# Patient Record
Sex: MALE | Race: WHITE | Employment: OTHER | URBAN - METROPOLITAN AREA
[De-identification: names, ages, dates, MRNs, and addresses within clinical notes are randomized per-mention and may not be internally consistent; named-entity substitution may affect disease eponyms.]

---

## 2017-04-17 PROBLEM — M25.512 CHRONIC PAIN OF BOTH SHOULDERS: Status: ACTIVE | Noted: 2017-04-17

## 2017-04-17 PROBLEM — M25.511 CHRONIC PAIN OF BOTH SHOULDERS: Status: ACTIVE | Noted: 2017-04-17

## 2017-04-17 PROBLEM — G89.29 CHRONIC PAIN OF BOTH SHOULDERS: Status: ACTIVE | Noted: 2017-04-17

## 2018-07-25 ENCOUNTER — HOSPITAL ENCOUNTER (EMERGENCY)
Facility: HOSPITAL | Age: 83
Discharge: HOME | End: 2018-07-25
Attending: EMERGENCY MEDICINE
Payer: MEDICARE

## 2018-07-25 ENCOUNTER — APPOINTMENT (OUTPATIENT)
Dept: CT IMAGING | Facility: HOSPITAL | Age: 83
End: 2018-07-25
Attending: EMERGENCY MEDICINE
Payer: MEDICARE

## 2018-07-25 VITALS
RESPIRATION RATE: 18 BRPM | HEART RATE: 70 BPM | SYSTOLIC BLOOD PRESSURE: 121 MMHG | HEIGHT: 70 IN | OXYGEN SATURATION: 96 % | DIASTOLIC BLOOD PRESSURE: 60 MMHG | TEMPERATURE: 98 F | WEIGHT: 155 LBS | BODY MASS INDEX: 22.19 KG/M2

## 2018-07-25 DIAGNOSIS — R10.9 ABDOMINAL PAIN OF UNKNOWN ETIOLOGY: Primary | ICD-10-CM

## 2018-07-25 LAB
ANION GAP SERPL CALC-SCNC: 10 MMOL/L (ref 0–18)
BASOPHILS # BLD: 0 K/UL (ref 0–0.2)
BASOPHILS NFR BLD: 0 %
BUN SERPL-MCNC: 14 MG/DL (ref 8–20)
BUN/CREAT SERPL: 12.6 (ref 10–20)
CALCIUM SERPL-MCNC: 9.2 MG/DL (ref 8.5–10.5)
CHLORIDE SERPL-SCNC: 105 MMOL/L (ref 95–110)
CO2 SERPL-SCNC: 23 MMOL/L (ref 22–32)
CREAT SERPL-MCNC: 1.11 MG/DL (ref 0.5–1.5)
EOSINOPHIL # BLD: 0 K/UL (ref 0–0.7)
EOSINOPHIL NFR BLD: 0 %
ERYTHROCYTE [DISTWIDTH] IN BLOOD BY AUTOMATED COUNT: 13.8 % (ref 11–15)
GLUCOSE SERPL-MCNC: 115 MG/DL (ref 70–99)
HCT VFR BLD AUTO: 44.4 % (ref 41–52)
HGB BLD-MCNC: 14.6 G/DL (ref 13.5–17.5)
LYMPHOCYTES # BLD: 1.6 K/UL (ref 1–4)
LYMPHOCYTES NFR BLD: 12 %
MCH RBC QN AUTO: 30 PG (ref 27–32)
MCHC RBC AUTO-ENTMCNC: 32.8 G/DL (ref 32–37)
MCV RBC AUTO: 91.5 FL (ref 80–100)
MONOCYTES # BLD: 1.2 K/UL (ref 0–1)
MONOCYTES NFR BLD: 9 %
NEUTROPHILS # BLD AUTO: 10.1 K/UL (ref 1.8–7.7)
NEUTROPHILS NFR BLD: 78 %
OSMOLALITY UR CALC.SUM OF ELEC: 287 MOSM/KG (ref 275–295)
PLATELET # BLD AUTO: 188 K/UL (ref 140–400)
PMV BLD AUTO: 7.9 FL (ref 7.4–10.3)
POTASSIUM SERPL-SCNC: 3.9 MMOL/L (ref 3.3–5.1)
RBC # BLD AUTO: 4.86 M/UL (ref 4.5–5.9)
SODIUM SERPL-SCNC: 138 MMOL/L (ref 136–144)
WBC # BLD AUTO: 12.9 K/UL (ref 4–11)

## 2018-07-25 PROCEDURE — 96360 HYDRATION IV INFUSION INIT: CPT

## 2018-07-25 PROCEDURE — 85025 COMPLETE CBC W/AUTO DIFF WBC: CPT | Performed by: EMERGENCY MEDICINE

## 2018-07-25 PROCEDURE — 74177 CT ABD & PELVIS W/CONTRAST: CPT | Performed by: EMERGENCY MEDICINE

## 2018-07-25 PROCEDURE — 80048 BASIC METABOLIC PNL TOTAL CA: CPT | Performed by: EMERGENCY MEDICINE

## 2018-07-25 PROCEDURE — 96361 HYDRATE IV INFUSION ADD-ON: CPT

## 2018-07-25 PROCEDURE — 99284 EMERGENCY DEPT VISIT MOD MDM: CPT

## 2018-07-25 RX ORDER — DICYCLOMINE HCL 20 MG
20 TABLET ORAL 4 TIMES DAILY PRN
Qty: 30 TABLET | Refills: 0 | Status: SHIPPED | OUTPATIENT
Start: 2018-07-25 | End: 2018-08-24

## 2018-07-25 RX ORDER — ACETAMINOPHEN 500 MG
1000 TABLET ORAL ONCE
Status: COMPLETED | OUTPATIENT
Start: 2018-07-25 | End: 2018-07-25

## 2018-07-25 NOTE — ED PROVIDER NOTES
Patient Seen in: [facility] AND [facility] Emergency Department    History   Patient presents with:  Abdominal Pain    Stated Complaint: Abdo pain    HPI    80-year-old male with history of nephrolithiasis, diverticulitis, neurogenic bladder, hyperlipidemia, BPH OTHER SURGICAL HISTORY      Comment: Fx R leg ORIF #2 arthotomy procedures  7-6-11: OTHER SURGICAL HISTORY      Comment: dr Ivy rousseau  8-11-11: OTHER SURGICAL HISTORY      Comment: cysto, laser litho bladder stone, laser                shaving of prosta complaint: Abdo pain  Other systems are as noted in HPI. Constitutional and vital signs reviewed. All other systems reviewed and negative except as noted above.     Physical Exam   ED Triage Vitals  BP: 115/67 [07/25/18 0651]  Pulse: 70 [07/25/18 0651 Result Value Ref Range   Glucose 115 (H) 70 - 99 mg/dL   Sodium 138 136 - 144 mmol/L   Potassium 3.9 3.3 - 5.1 mmol/L   Chloride 105 95 - 110 mmol/L   CO2 23 22 - 32 mmol/L   BUN 14 8 - 20 mg/dL   Creatinine 1.11 0.50 - 1.50 mg/dL   Calcium, Total 9.2 8. meds/nausea meds offered, pt declining  - pt requesting tylenol on discharge  - supportive care discussed    Medical Record Review: I personally reviewed available prior medical records for any recent pertinent discharge summaries, testing, and procedures, primary care provider within the next three months to obtain basic health screening including reassessment of your blood pressure.     Medications Prescribed:  Current Discharge Medication List    START taking these medications    Dicyclomine HCl 20 MG Oral

## 2018-09-12 ENCOUNTER — OFFICE VISIT (OUTPATIENT)
Dept: NEUROLOGY | Facility: CLINIC | Age: 83
End: 2018-09-12
Payer: MEDICARE

## 2018-09-12 VITALS — WEIGHT: 156 LBS | HEIGHT: 68 IN | BODY MASS INDEX: 23.64 KG/M2

## 2018-09-12 DIAGNOSIS — M51.16 RADICULOPATHY DUE TO LUMBAR INTERVERTEBRAL DISC DISORDER: Primary | ICD-10-CM

## 2018-09-12 DIAGNOSIS — M54.12 RADICULOPATHY OF CERVICAL REGION: ICD-10-CM

## 2018-09-12 PROCEDURE — 99204 OFFICE O/P NEW MOD 45 MIN: CPT | Performed by: OTHER

## 2018-09-12 RX ORDER — ASPIRIN 325 MG
325 TABLET ORAL
COMMUNITY
End: 2019-05-15 | Stop reason: ALTCHOICE

## 2018-09-12 NOTE — H&P
[facility], [place] [address], [address], [facility]    History and Physical    Angie Temple.  Patient Status:  No patient class for patient encounter    1935 MRN [id number]   Location [facility], [place] [address] HISTORY      Comment:  cysto, dr Liliana Cohen  8-11-11: OTHER SURGICAL HISTORY      Comment:  cysto, laser litho bladder stone, laser shaving of                prostate Dr Pratik Madden  7/21/16: OTHER SURGICAL HISTORY      Comment:  cystoscopy dr Liliana Cohen   9/1/16: OTHER S

## 2018-09-12 NOTE — PROGRESS NOTES
Mr. Jonah Low, is self-referred, due to the fact that I had evaluated him in my private practice about 5 years ago. He has been treated with injections and left and then right rotator cuff for pain with resolution.   He is in the office with long history lumba Surgical History:  1943: APPENDECTOMY  8/14/09: COLONOSCOPY,DIAGNOSTIC      Comment:  5mm ascending and sigmoid adenomatous polyps,                diverticulosis  10/2/15: COLONOSCOPY,DIAGNOSTIC      Comment:  sigmoid polyp, diverticulosis  8/27/09: HERNIA No      Sexual activity: Not on file    Other Topics      Concerns:         Service: Not Asked        Blood Transfusions: Not Asked        Caffeine Concern: Yes        Occupational Exposure: Not Asked        Hobby Hazards: Not Asked        Sleep Co the upper and lower extremities, downgoing toes, no hoffmans, no clonus. Right Achilles reflex is reduced. Coordination: Finger to nose, heel to shin intact bilaterally. No Ataxia noted. Gait: Narrow based, negative Romberg’s sign, heel to shin intact.

## 2019-05-15 PROBLEM — I70.0 ATHEROSCLEROSIS OF ABDOMINAL AORTA: Status: ACTIVE | Noted: 2019-05-15

## 2019-05-15 PROBLEM — I70.0 ATHEROSCLEROSIS OF ABDOMINAL AORTA (HCC): Status: ACTIVE | Noted: 2019-05-15

## 2019-08-02 ENCOUNTER — OFFICE VISIT (OUTPATIENT)
Dept: SURGERY | Facility: CLINIC | Age: 84
End: 2019-08-02
Payer: MEDICARE

## 2019-08-02 VITALS
DIASTOLIC BLOOD PRESSURE: 63 MMHG | BODY MASS INDEX: 22.96 KG/M2 | TEMPERATURE: 98 F | HEART RATE: 62 BPM | HEIGHT: 69 IN | SYSTOLIC BLOOD PRESSURE: 110 MMHG | WEIGHT: 155 LBS

## 2019-08-02 DIAGNOSIS — N19 RENAL FAILURE, UNSPECIFIED CHRONICITY: ICD-10-CM

## 2019-08-02 DIAGNOSIS — K40.30 INGUINAL HERNIA OF LEFT SIDE WITH OBSTRUCTION: ICD-10-CM

## 2019-08-02 DIAGNOSIS — N31.9 HYPOTONIC NEUROGENIC BLADDER: Primary | ICD-10-CM

## 2019-08-02 DIAGNOSIS — R33.8 BENIGN PROSTATIC HYPERPLASIA WITH URINARY RETENTION: ICD-10-CM

## 2019-08-02 DIAGNOSIS — R35.1 NOCTURIA: ICD-10-CM

## 2019-08-02 DIAGNOSIS — R33.9 URINARY RETENTION: ICD-10-CM

## 2019-08-02 DIAGNOSIS — N40.1 BENIGN PROSTATIC HYPERPLASIA WITH URINARY RETENTION: ICD-10-CM

## 2019-08-02 PROCEDURE — G0463 HOSPITAL OUTPT CLINIC VISIT: HCPCS | Performed by: UROLOGY

## 2019-08-02 PROCEDURE — 99204 OFFICE O/P NEW MOD 45 MIN: CPT | Performed by: UROLOGY

## 2019-08-02 NOTE — PATIENT INSTRUCTIONS
Nilesh Maki M.D.    1.  Continue self intermittent catheterization 3 times daily, sterile technique with disposable catheter.   On the days that you think your urine output will be larger such as days where you might drink

## 2019-08-02 NOTE — PROGRESS NOTES
Ziyad Jalloh. is a 80year old male. HPI:   Patient presents with:  Consult: Patient is a 80year old male here to discuss continuation of CIC. Retention: Patient states he is CIC 3x per day at 8 hours intervals. CIC since 2011.  History of hong that he has 2 cups of coffee in the morning and either milk or wine with dinner. BPH  Chronic.  S/p greenlight laser ablation of prostate by another urologist. Patient is not taking any Avodart or finasteride as well as any OTC or herbal prostate medica bladder, but no evidence for fistula. Abnormal mural thickening of urinary bladder which may be reactive or secondary to cystitis. Small fat containing left inguinal hernia.   6/4/15 simple CMG--no bladder contraction; sensation is impaired; did not even re voids about 300-350 cc daily on his own and that ranges from 125 to as much as 600 cc a day; he catheterizes himself 3 times a day. Average volume obtained on catheterization about 750 850 cc; he did obtain volumes of 900--975 cc several times, however. 7/21/16    cystoscopy dr Alyssia Beasley    • OTHER SURGICAL HISTORY  9/1/16    interstim pne dr Barth Apo      Family History   Problem Relation Age of Onset   • Glaucoma Mother    • Diabetes Neg         family h/o      Social History: Social Hi Constitutional: appears well hydrated alert and responsive no acute distress noted  Neurological: Oriented to time, place, person with normal affect  Exam appropriate for age; patellar reflexes 1/2+ bilaterally  Head/Face: normocephalic  Eyes/Vision: no hypodense lesions in the kidneys bilaterally, likely cysts; degenerative disease of the thoracic and lumbar spine; bladder wall thickening likely secondary from an enlarged prostate    6/22/15 transrectal ultrasound of prostate @ [facility] = prostate volume 35. 8 3x a day but 4x a day on days that he pushes more fluids. I fully explained to patient the benefits, risks, and alternatives to this treatment option and I answered patient's questions; patient decides to continue self intermittent catheterization.  Patient intermittent catheterization 3 times daily, sterile technique with disposable catheter.   On the days that you think your urine output will be larger such as days where you might drink more coffee than typical in the morning OR on a day when you are drinkin

## 2019-08-06 ENCOUNTER — HOSPITAL ENCOUNTER (OUTPATIENT)
Dept: ULTRASOUND IMAGING | Age: 84
Discharge: HOME | End: 2019-08-06
Attending: UROLOGY
Payer: MEDICARE

## 2019-08-06 DIAGNOSIS — N19 RENAL FAILURE, UNSPECIFIED CHRONICITY: ICD-10-CM

## 2019-08-06 PROCEDURE — 76775 US EXAM ABDO BACK WALL LIM: CPT | Performed by: UROLOGY

## 2020-04-02 ENCOUNTER — TELEPHONE (OUTPATIENT)
Dept: SURGERY | Facility: CLINIC | Age: 85
End: 2020-04-02

## 2020-04-02 NOTE — TELEPHONE ENCOUNTER
Received fax from [address] [place] [address] for detailed order for cath supplies. Order placed on MD's desk.

## 2020-04-06 NOTE — TELEPHONE ENCOUNTER
Order signed by MD, faxed to [address] [place] [address]. Confirmation received. Copy sent to scanning.

## 2020-04-27 NOTE — TELEPHONE ENCOUNTER
Received another fax from [facility] duplicate order. Re faxed original order to [facility]. Confirmation received.

## 2020-12-05 ENCOUNTER — LAB REQUISITION (OUTPATIENT)
Dept: LAB | Facility: HOSPITAL | Age: 85
End: 2020-12-05
Payer: MEDICARE

## 2020-12-05 DIAGNOSIS — Z01.818 ENCOUNTER FOR OTHER PREPROCEDURAL EXAMINATION: ICD-10-CM

## 2021-04-12 ENCOUNTER — TELEPHONE (OUTPATIENT)
Dept: SURGERY | Facility: CLINIC | Age: 86
End: 2021-04-12

## 2021-04-12 NOTE — TELEPHONE ENCOUNTER
Orders from [facility] faxed to us needs to be signed by Dr. Breanne Leon and then faxed back. Pt LOV with Dr. Breanne Leon was 8/2/19 if Dr. Breanne Leon agrees with orders then we will fax back to secure number. Placed on Dr. Breanne Leon desk    1.   Continue self intermittent catheteriza

## 2021-06-04 ENCOUNTER — TELEPHONE (OUTPATIENT)
Dept: SURGERY | Facility: CLINIC | Age: 86
End: 2021-06-04

## 2021-06-04 NOTE — TELEPHONE ENCOUNTER
Pt has a sterile lubricating gel- 95 sealed containers - he cannot return - asking if the Dr wants them

## 2021-10-20 PROBLEM — M25.511 CHRONIC PAIN OF BOTH SHOULDERS: Status: RESOLVED | Noted: 2017-04-17 | Resolved: 2021-10-20

## 2021-10-20 PROBLEM — G89.29 CHRONIC PAIN OF BOTH SHOULDERS: Status: RESOLVED | Noted: 2017-04-17 | Resolved: 2021-10-20

## 2021-10-20 PROBLEM — M25.512 CHRONIC PAIN OF BOTH SHOULDERS: Status: RESOLVED | Noted: 2017-04-17 | Resolved: 2021-10-20

## 2022-03-25 ENCOUNTER — HOSPITAL ENCOUNTER (EMERGENCY)
Facility: HOSPITAL | Age: 87
Discharge: HOME | End: 2022-03-26
Attending: EMERGENCY MEDICINE
Payer: MEDICARE

## 2022-03-25 VITALS
OXYGEN SATURATION: 97 % | RESPIRATION RATE: 18 BRPM | SYSTOLIC BLOOD PRESSURE: 144 MMHG | HEIGHT: 70 IN | BODY MASS INDEX: 20.76 KG/M2 | WEIGHT: 145 LBS | HEART RATE: 82 BPM | DIASTOLIC BLOOD PRESSURE: 65 MMHG | TEMPERATURE: 97 F

## 2022-03-25 DIAGNOSIS — L03.116 CELLULITIS OF LEFT LOWER EXTREMITY: ICD-10-CM

## 2022-03-25 DIAGNOSIS — M79.89 LEFT LEG SWELLING: Primary | ICD-10-CM

## 2022-03-25 PROCEDURE — 99284 EMERGENCY DEPT VISIT MOD MDM: CPT

## 2022-03-26 ENCOUNTER — APPOINTMENT (OUTPATIENT)
Dept: GENERAL RADIOLOGY | Facility: HOSPITAL | Age: 87
End: 2022-03-26
Attending: EMERGENCY MEDICINE
Payer: MEDICARE

## 2022-03-26 ENCOUNTER — APPOINTMENT (OUTPATIENT)
Dept: ULTRASOUND IMAGING | Facility: HOSPITAL | Age: 87
End: 2022-03-26
Attending: EMERGENCY MEDICINE
Payer: MEDICARE

## 2022-03-26 PROCEDURE — 93971 EXTREMITY STUDY: CPT | Performed by: EMERGENCY MEDICINE

## 2022-03-26 PROCEDURE — 73590 X-RAY EXAM OF LOWER LEG: CPT | Performed by: EMERGENCY MEDICINE

## 2022-03-26 RX ORDER — CEFADROXIL 500 MG/1
500 CAPSULE ORAL 2 TIMES DAILY
Qty: 20 CAPSULE | Refills: 0 | Status: SHIPPED | OUTPATIENT
Start: 2022-03-26 | End: 2022-04-05

## 2022-03-26 RX ORDER — CEPHALEXIN 500 MG/1
500 CAPSULE ORAL ONCE
Status: COMPLETED | OUTPATIENT
Start: 2022-03-26 | End: 2022-03-26

## 2022-03-26 NOTE — ED QUICK NOTES
Pt was dressed and at doorway during dc. Pt a/ox4, respirations unlabored, speech full/clear, gait steady, no acute distress. All ED orders completed. Pt instructed to return to ED for any new/severe s/s or as directed by provider, and to see PMD/specialist ASAP or as directed by provider.

## 2022-03-26 NOTE — ED INITIAL ASSESSMENT (HPI)
Pt c/o infection to left knee. Pt states he fell and the wound has not healed. Pt denies injury to head or loc. Pt states he is not taking blood thinners.

## 2022-05-24 ENCOUNTER — TELEPHONE (OUTPATIENT)
Dept: SURGERY | Facility: CLINIC | Age: 87
End: 2022-05-24

## 2022-05-24 NOTE — TELEPHONE ENCOUNTER
LINDAK returned a signed script for CIC caths and lube for this pt for 4 times daily and I signed the script in the [address] [place] [address] online portal and and I sent the original to scanning.

## 2022-07-11 ENCOUNTER — TELEPHONE (OUTPATIENT)
Dept: SURGERY | Facility: CLINIC | Age: 87
End: 2022-07-11

## 2022-11-07 ENCOUNTER — HOSPITAL ENCOUNTER (EMERGENCY)
Facility: HOSPITAL | Age: 87
Discharge: LEFT BEFORE DISCHARGE | End: 2022-11-07
Payer: MEDICARE

## 2022-11-07 VITALS
OXYGEN SATURATION: 96 % | RESPIRATION RATE: 16 BRPM | TEMPERATURE: 98 F | HEIGHT: 70 IN | HEART RATE: 84 BPM | BODY MASS INDEX: 18.61 KG/M2 | WEIGHT: 130 LBS | DIASTOLIC BLOOD PRESSURE: 68 MMHG | SYSTOLIC BLOOD PRESSURE: 108 MMHG

## 2022-12-31 ENCOUNTER — APPOINTMENT (OUTPATIENT)
Dept: ULTRASOUND IMAGING | Facility: HOSPITAL | Age: 87
End: 2022-12-31
Attending: EMERGENCY MEDICINE
Payer: MEDICARE

## 2022-12-31 ENCOUNTER — HOSPITAL ENCOUNTER (EMERGENCY)
Facility: HOSPITAL | Age: 87
Discharge: HOME | End: 2022-12-31
Attending: EMERGENCY MEDICINE
Payer: MEDICARE

## 2022-12-31 VITALS
OXYGEN SATURATION: 98 % | BODY MASS INDEX: 24.34 KG/M2 | HEART RATE: 69 BPM | HEIGHT: 70 IN | WEIGHT: 170 LBS | DIASTOLIC BLOOD PRESSURE: 82 MMHG | RESPIRATION RATE: 18 BRPM | SYSTOLIC BLOOD PRESSURE: 131 MMHG | TEMPERATURE: 98 F

## 2022-12-31 DIAGNOSIS — M71.22 BAKER'S CYST, LEFT: ICD-10-CM

## 2022-12-31 DIAGNOSIS — L03.116 CELLULITIS OF LEFT LOWER EXTREMITY: Primary | ICD-10-CM

## 2022-12-31 PROCEDURE — 99284 EMERGENCY DEPT VISIT MOD MDM: CPT

## 2022-12-31 PROCEDURE — 93971 EXTREMITY STUDY: CPT | Performed by: EMERGENCY MEDICINE

## 2022-12-31 RX ORDER — CEPHALEXIN 500 MG/1
500 CAPSULE ORAL ONCE
Status: COMPLETED | OUTPATIENT
Start: 2022-12-31 | End: 2022-12-31

## 2022-12-31 RX ORDER — CEFADROXIL 500 MG/1
500 CAPSULE ORAL 2 TIMES DAILY
Qty: 20 CAPSULE | Refills: 0 | Status: SHIPPED | OUTPATIENT
Start: 2022-12-31 | End: 2023-01-10

## 2022-12-31 NOTE — ED QUICK NOTES
Patient provided with discharge instructions. Verbalized understanding of plan of care at home and follow up. All questions/concerns addressed prior to discharge. Patient ambulatory and in no acute distress.

## 2022-12-31 NOTE — ED QUICK NOTES
Upon assessment patient stated he \"was getting ready to walk out\". Patient agreed to [facility]. US was completed Patient refused lab work x 2. MD notified.

## 2023-04-30 ENCOUNTER — HOSPITAL ENCOUNTER (EMERGENCY)
Facility: HOSPITAL | Age: 88
Discharge: HOME | End: 2023-04-30
Attending: EMERGENCY MEDICINE
Payer: MEDICARE

## 2023-04-30 VITALS
RESPIRATION RATE: 14 BRPM | DIASTOLIC BLOOD PRESSURE: 63 MMHG | HEART RATE: 57 BPM | SYSTOLIC BLOOD PRESSURE: 119 MMHG | WEIGHT: 130 LBS | OXYGEN SATURATION: 98 % | TEMPERATURE: 97 F | BODY MASS INDEX: 19 KG/M2

## 2023-04-30 DIAGNOSIS — Z00.00 GENERAL MEDICAL EXAMINATION: Primary | ICD-10-CM

## 2023-04-30 LAB
ANION GAP SERPL CALC-SCNC: 7 MMOL/L (ref 0–18)
ATRIAL RATE: 72 BPM
BASOPHILS # BLD AUTO: 0.03 X10(3) UL (ref 0–0.2)
BASOPHILS NFR BLD AUTO: 0.5 %
BUN BLD-MCNC: 17 MG/DL (ref 7–18)
BUN/CREAT SERPL: 18.1 (ref 10–20)
CALCIUM BLD-MCNC: 9.3 MG/DL (ref 8.5–10.1)
CHLORIDE SERPL-SCNC: 111 MMOL/L (ref 98–112)
CO2 SERPL-SCNC: 26 MMOL/L (ref 21–32)
COHGB MFR BLD: 1.5 % (ref 0–3)
CREAT BLD-MCNC: 0.94 MG/DL
DEPRECATED RDW RBC AUTO: 48.3 FL (ref 35.1–46.3)
EOSINOPHIL # BLD AUTO: 0.18 X10(3) UL (ref 0–0.7)
EOSINOPHIL NFR BLD AUTO: 3.1 %
ERYTHROCYTE [DISTWIDTH] IN BLOOD BY AUTOMATED COUNT: 14.1 % (ref 11–15)
GFR SERPLBLD BASED ON 1.73 SQ M-ARVRAT: 78 ML/MIN/1.73M2 (ref 60–?)
GLUCOSE BLD-MCNC: 102 MG/DL (ref 70–99)
HCT VFR BLD AUTO: 40.6 %
HGB BLD-MCNC: 12.8 G/DL
IMM GRANULOCYTES # BLD AUTO: 0.01 X10(3) UL (ref 0–1)
IMM GRANULOCYTES NFR BLD: 0.2 %
LYMPHOCYTES # BLD AUTO: 1.6 X10(3) UL (ref 1–4)
LYMPHOCYTES NFR BLD AUTO: 27.6 %
MCH RBC QN AUTO: 29.4 PG (ref 26–34)
MCHC RBC AUTO-ENTMCNC: 31.5 G/DL (ref 31–37)
MCV RBC AUTO: 93.3 FL
MONOCYTES # BLD AUTO: 0.51 X10(3) UL (ref 0.1–1)
MONOCYTES NFR BLD AUTO: 8.8 %
NEUTROPHILS # BLD AUTO: 3.47 X10 (3) UL (ref 1.5–7.7)
NEUTROPHILS # BLD AUTO: 3.47 X10(3) UL (ref 1.5–7.7)
NEUTROPHILS NFR BLD AUTO: 59.8 %
OSMOLALITY SERPL CALC.SUM OF ELEC: 300 MOSM/KG (ref 275–295)
P AXIS: 84 DEGREES
P-R INTERVAL: 182 MS
PLATELET # BLD AUTO: 243 10(3)UL (ref 150–450)
POTASSIUM SERPL-SCNC: 4.3 MMOL/L (ref 3.5–5.1)
PUNCTURE CHARGE: NO
Q-T INTERVAL: 406 MS
QRS DURATION: 84 MS
QTC CALCULATION (BEZET): 444 MS
R AXIS: -6 DEGREES
RBC # BLD AUTO: 4.35 X10(6)UL
SODIUM SERPL-SCNC: 144 MMOL/L (ref 136–145)
T AXIS: 21 DEGREES
VENTRICULAR RATE: 72 BPM
WBC # BLD AUTO: 5.8 X10(3) UL (ref 4–11)

## 2023-04-30 PROCEDURE — 82375 ASSAY CARBOXYHB QUANT: CPT | Performed by: EMERGENCY MEDICINE

## 2023-04-30 PROCEDURE — 36415 COLL VENOUS BLD VENIPUNCTURE: CPT

## 2023-04-30 PROCEDURE — 85025 COMPLETE CBC W/AUTO DIFF WBC: CPT | Performed by: EMERGENCY MEDICINE

## 2023-04-30 PROCEDURE — 93010 ELECTROCARDIOGRAM REPORT: CPT

## 2023-04-30 PROCEDURE — 99284 EMERGENCY DEPT VISIT MOD MDM: CPT

## 2023-04-30 PROCEDURE — 80048 BASIC METABOLIC PNL TOTAL CA: CPT | Performed by: EMERGENCY MEDICINE

## 2023-04-30 PROCEDURE — 93005 ELECTROCARDIOGRAM TRACING: CPT

## 2023-04-30 PROCEDURE — 99283 EMERGENCY DEPT VISIT LOW MDM: CPT

## 2023-04-30 NOTE — DISCHARGE INSTRUCTIONS
Return to the ER if your dizziness is persistent or if you have other symptoms such as numbness, tingling, weakness of your arms or legs, trouble walking, headache, facial droop, slurred speech, chest pain, trouble breathing, or other concerns.

## 2023-04-30 NOTE — ED QUICK NOTES
Patient has been medically cleared for discharge, patient states he will be walking back to [facility], MD notified and is okay with that. Charla Murcia called and notified patient is leaving now and will be walking back. Pt A&OX4, pt denies dizziness/lightheadedness, cp, sob, n/v. Discharge paperwork follow-up discussed with pt, pt verbally understands them. Pt discharged ambulatory with steady gait.

## 2023-04-30 NOTE — ED INITIAL ASSESSMENT (HPI)
Patient to ED for c/o dizziness upon waking up today, he thought maybe there was a C0 leak so he opened up the windows and states the dizziness subsided. He currently denies any complaints at this time. Denies pain. The  that arrived to check the C0 states there were no issues.

## 2023-07-03 ENCOUNTER — APPOINTMENT (OUTPATIENT)
Dept: CT IMAGING | Facility: HOSPITAL | Age: 88
End: 2023-07-03
Attending: EMERGENCY MEDICINE
Payer: MEDICARE

## 2023-07-03 ENCOUNTER — HOSPITAL ENCOUNTER (INPATIENT)
Facility: HOSPITAL | Age: 88
LOS: 16 days | Discharge: NURSING, REHAB OR LONG-TERM CARE | End: 2023-07-19
Attending: EMERGENCY MEDICINE | Admitting: HOSPITALIST
Payer: MEDICARE

## 2023-07-03 ENCOUNTER — APPOINTMENT (OUTPATIENT)
Dept: ULTRASOUND IMAGING | Facility: HOSPITAL | Age: 88
End: 2023-07-03
Attending: INTERNAL MEDICINE
Payer: MEDICARE

## 2023-07-03 ENCOUNTER — APPOINTMENT (OUTPATIENT)
Dept: GENERAL RADIOLOGY | Facility: HOSPITAL | Age: 88
End: 2023-07-03
Attending: EMERGENCY MEDICINE
Payer: MEDICARE

## 2023-07-03 DIAGNOSIS — R41.0 DELIRIUM, ACUTE: ICD-10-CM

## 2023-07-03 DIAGNOSIS — R50.9 FEVER, UNSPECIFIED FEVER CAUSE: Primary | ICD-10-CM

## 2023-07-03 LAB
ALBUMIN SERPL-MCNC: 3.2 G/DL (ref 3.4–5)
ALP LIVER SERPL-CCNC: 58 U/L
ALT SERPL-CCNC: 20 U/L
ANION GAP SERPL CALC-SCNC: 6 MMOL/L (ref 0–18)
AST SERPL-CCNC: 19 U/L (ref 15–37)
BASOPHILS # BLD AUTO: 0.03 X10(3) UL (ref 0–0.2)
BASOPHILS NFR BLD AUTO: 0.2 %
BILIRUB DIRECT SERPL-MCNC: 0.3 MG/DL (ref 0–0.2)
BILIRUB SERPL-MCNC: 1.3 MG/DL (ref 0.1–2)
BILIRUB UR QL: NEGATIVE
BUN BLD-MCNC: 18 MG/DL (ref 7–18)
BUN/CREAT SERPL: 15.3 (ref 10–20)
CALCIUM BLD-MCNC: 8.5 MG/DL (ref 8.5–10.1)
CHLORIDE SERPL-SCNC: 112 MMOL/L (ref 98–112)
CLARITY UR: CLEAR
CO2 SERPL-SCNC: 23 MMOL/L (ref 21–32)
COLOR UR: YELLOW
CREAT BLD-MCNC: 1.18 MG/DL
DEPRECATED RDW RBC AUTO: 49.8 FL (ref 35.1–46.3)
EOSINOPHIL # BLD AUTO: 0.04 X10(3) UL (ref 0–0.7)
EOSINOPHIL NFR BLD AUTO: 0.2 %
ERYTHROCYTE [DISTWIDTH] IN BLOOD BY AUTOMATED COUNT: 14.4 % (ref 11–15)
GFR SERPLBLD BASED ON 1.73 SQ M-ARVRAT: 59 ML/MIN/1.73M2 (ref 60–?)
GLUCOSE BLD-MCNC: 151 MG/DL (ref 70–99)
GLUCOSE UR-MCNC: NORMAL MG/DL
HCT VFR BLD AUTO: 37.5 %
HGB BLD-MCNC: 12.1 G/DL
IMM GRANULOCYTES # BLD AUTO: 0.12 X10(3) UL (ref 0–1)
IMM GRANULOCYTES NFR BLD: 0.6 %
INR BLD: 1.22 (ref 0.85–1.16)
KETONES UR-MCNC: 10 MG/DL
LACTATE SERPL-SCNC: 1.8 MMOL/L (ref 0.4–2)
LEUKOCYTE ESTERASE UR QL STRIP.AUTO: 500
LYMPHOCYTES # BLD AUTO: 0.58 X10(3) UL (ref 1–4)
LYMPHOCYTES NFR BLD AUTO: 3 %
MCH RBC QN AUTO: 29.9 PG (ref 26–34)
MCHC RBC AUTO-ENTMCNC: 32.3 G/DL (ref 31–37)
MCV RBC AUTO: 92.6 FL
MONOCYTES # BLD AUTO: 0.66 X10(3) UL (ref 0.1–1)
MONOCYTES NFR BLD AUTO: 3.5 %
NEUTROPHILS # BLD AUTO: 17.7 X10 (3) UL (ref 1.5–7.7)
NEUTROPHILS # BLD AUTO: 17.7 X10(3) UL (ref 1.5–7.7)
NEUTROPHILS NFR BLD AUTO: 92.5 %
OSMOLALITY SERPL CALC.SUM OF ELEC: 297 MOSM/KG (ref 275–295)
PH UR: 5.5 [PH] (ref 5–8)
PLATELET # BLD AUTO: 248 10(3)UL (ref 150–450)
POTASSIUM SERPL-SCNC: 4 MMOL/L (ref 3.5–5.1)
PROT SERPL-MCNC: 6.9 G/DL (ref 6.4–8.2)
PROTHROMBIN TIME: 15.3 SECONDS (ref 11.6–14.8)
RBC # BLD AUTO: 4.05 X10(6)UL
RBC #/AREA URNS AUTO: >10 /HPF
SARS-COV-2 RNA RESP QL NAA+PROBE: NOT DETECTED
SODIUM SERPL-SCNC: 141 MMOL/L (ref 136–145)
SP GR UR STRIP: 1.02 (ref 1–1.03)
TROPONIN I HIGH SENSITIVITY: 14 NG/L
UROBILINOGEN UR STRIP-ACNC: NORMAL
WBC # BLD AUTO: 19.1 X10(3) UL (ref 4–11)

## 2023-07-03 PROCEDURE — 76770 US EXAM ABDO BACK WALL COMP: CPT | Performed by: INTERNAL MEDICINE

## 2023-07-03 PROCEDURE — 71045 X-RAY EXAM CHEST 1 VIEW: CPT | Performed by: EMERGENCY MEDICINE

## 2023-07-03 PROCEDURE — 3E033XZ INTRODUCTION OF VASOPRESSOR INTO PERIPHERAL VEIN, PERCUTANEOUS APPROACH: ICD-10-PCS | Performed by: HOSPITALIST

## 2023-07-03 PROCEDURE — 02HV33Z INSERTION OF INFUSION DEVICE INTO SUPERIOR VENA CAVA, PERCUTANEOUS APPROACH: ICD-10-PCS | Performed by: EMERGENCY MEDICINE

## 2023-07-03 PROCEDURE — 70450 CT HEAD/BRAIN W/O DYE: CPT | Performed by: EMERGENCY MEDICINE

## 2023-07-03 RX ORDER — MELATONIN
3 NIGHTLY PRN
Status: DISCONTINUED | OUTPATIENT
Start: 2023-07-03 | End: 2023-07-19

## 2023-07-03 RX ORDER — ENEMA 19; 7 G/133ML; G/133ML
1 ENEMA RECTAL ONCE AS NEEDED
Status: DISCONTINUED | OUTPATIENT
Start: 2023-07-03 | End: 2023-07-19

## 2023-07-03 RX ORDER — ACETAMINOPHEN 325 MG/1
650 TABLET ORAL EVERY 4 HOURS PRN
Status: DISCONTINUED | OUTPATIENT
Start: 2023-07-03 | End: 2023-07-19

## 2023-07-03 RX ORDER — METOCLOPRAMIDE HYDROCHLORIDE 5 MG/ML
10 INJECTION INTRAMUSCULAR; INTRAVENOUS EVERY 8 HOURS PRN
Status: DISCONTINUED | OUTPATIENT
Start: 2023-07-03 | End: 2023-07-07

## 2023-07-03 RX ORDER — POLYETHYLENE GLYCOL 3350 17 G/17G
17 POWDER, FOR SOLUTION ORAL DAILY PRN
Status: DISCONTINUED | OUTPATIENT
Start: 2023-07-03 | End: 2023-07-19

## 2023-07-03 RX ORDER — BISACODYL 10 MG
10 SUPPOSITORY, RECTAL RECTAL
Status: DISCONTINUED | OUTPATIENT
Start: 2023-07-03 | End: 2023-07-19

## 2023-07-03 RX ORDER — DEXMEDETOMIDINE HYDROCHLORIDE 4 UG/ML
INJECTION, SOLUTION INTRAVENOUS
Status: COMPLETED
Start: 2023-07-03 | End: 2023-07-03

## 2023-07-03 RX ORDER — DEXMEDETOMIDINE HYDROCHLORIDE 4 UG/ML
INJECTION, SOLUTION INTRAVENOUS CONTINUOUS
Status: DISCONTINUED | OUTPATIENT
Start: 2023-07-03 | End: 2023-07-05

## 2023-07-03 RX ORDER — ENOXAPARIN SODIUM 100 MG/ML
40 INJECTION SUBCUTANEOUS DAILY
Status: DISCONTINUED | OUTPATIENT
Start: 2023-07-04 | End: 2023-07-19

## 2023-07-03 RX ORDER — ACETAMINOPHEN 500 MG
500 TABLET ORAL EVERY 4 HOURS PRN
Status: DISCONTINUED | OUTPATIENT
Start: 2023-07-03 | End: 2023-07-19

## 2023-07-03 RX ORDER — SODIUM CHLORIDE 9 MG/ML
INJECTION, SOLUTION INTRAVENOUS CONTINUOUS
Status: DISCONTINUED | OUTPATIENT
Start: 2023-07-03 | End: 2023-07-06

## 2023-07-03 RX ORDER — HYDROCODONE BITARTRATE AND ACETAMINOPHEN 5; 325 MG/1; MG/1
2 TABLET ORAL EVERY 4 HOURS PRN
Status: DISCONTINUED | OUTPATIENT
Start: 2023-07-03 | End: 2023-07-06

## 2023-07-03 RX ORDER — DEXMEDETOMIDINE HYDROCHLORIDE 4 UG/ML
INJECTION, SOLUTION INTRAVENOUS CONTINUOUS
Status: DISCONTINUED | OUTPATIENT
Start: 2023-07-03 | End: 2023-07-03

## 2023-07-03 RX ORDER — ACETAMINOPHEN 500 MG
1000 TABLET ORAL ONCE
Status: COMPLETED | OUTPATIENT
Start: 2023-07-03 | End: 2023-07-03

## 2023-07-03 RX ORDER — HYDROCODONE BITARTRATE AND ACETAMINOPHEN 5; 325 MG/1; MG/1
1 TABLET ORAL EVERY 4 HOURS PRN
Status: DISCONTINUED | OUTPATIENT
Start: 2023-07-03 | End: 2023-07-06

## 2023-07-03 RX ORDER — SENNOSIDES 8.6 MG
17.2 TABLET ORAL NIGHTLY PRN
Status: DISCONTINUED | OUTPATIENT
Start: 2023-07-03 | End: 2023-07-19

## 2023-07-03 RX ORDER — ONDANSETRON 2 MG/ML
4 INJECTION INTRAMUSCULAR; INTRAVENOUS EVERY 6 HOURS PRN
Status: DISCONTINUED | OUTPATIENT
Start: 2023-07-03 | End: 2023-07-19

## 2023-07-03 NOTE — ED INITIAL ASSESSMENT (HPI)
To ED via [place] [address] EMS for c/o AMS. Per EMS, patient was sitting in chair and was confused. Patient usually AOX3-4. AOX1 upon arrival. Per EMS HR elevated, low BP (systolic low 90 when arrived to [facility]), and warm to the touch.

## 2023-07-03 NOTE — ED QUICK NOTES
Orders for admission, patient is aware of plan and ready to go upstairs. Any questions, please call ED RN Danyel Garcia at extension [phone].      Patient Covid vaccination status: Fully vaccinated     COVID Test Ordered in ED: Rapid SARS-CoV-2 by PCR    COVID Suspicion at Admission: N/A    Running Infusions:      Mental Status/LOC at time of transport: Alert to self    Other pertinent information: On room air, 16G IV R AC    CIWA score: N/A   NIH score:  N/A

## 2023-07-03 NOTE — ED QUICK NOTES
MD made aware of patient's BP, NS bolus administered as ordered. Hospitalist to bedside. Patient to be prepped for central line placement.

## 2023-07-03 NOTE — CONSULTS
Critical Care Consult     Assessment / Plan:  Septic shock - suspect urinary source given positive UA  - s/p IVF  - continue norepinephrine as needed  - lactic acid normal  - check EKG, troponin, and TTE  - follow up chest x-ray  - follow up BMP, LFTs, CBC, and lipase  - check renal ultrasound to rule out obstruction  - antibiotics as below  UTI - positive UA  - s/p ceftriaxone in ED start zosyn (7/3-)  - follow up urine and blood cultures  - place pinzon if urinary   Encephalopathy - suspect due to sepsis  - follow up head CT  - treatment of sepsis as above  FEN  - NPO for now  Ppx  - SCDs  - chemoprophylaxis pending labs and head CT  Dispo  - at risk of decompensation due to septic shock  - admit to ICU, will follow    Discussed with patient, Dr. Kirk Ramirez, and Dr. 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 [facility] was called due to confusion and found the patient to be confused with systolic blood pressure in the 90's. He was brought to the ED and found to be febrile and hypotensive. UA appeared to be infected and he was started on antibiotics and norepinephrine, central line was placed and he is being admitted to the ICU. He currently reports feeling well other than some minor pain around the central line insertion site. He denies chest pain, dyspnea, abdominal pain, diarrhea, fevers. He reports he straight caths twice daily but denies any recent changes to his urine or new urinary symptoms. 12 point ROS negative except per HPI.     Past Medical History:   Diagnosis Date    Benign prostatic hypertrophy     Blepharitis 2005    OU    Cataract 2002    OD    Cataract 2002    OS    Chalazion of left upper eyelid 6/16/2009    OS    Diverticulitis 7/15    Floaters 2008    [address] [facility] (meibomian gland dysfunction) 2008    OU    Neurogenic bladder     self caths TID    Other and unspecified hyperlipidemia     OTHER DISEASES     Hypogonadism    Pinguecula 2005    OU       Past Surgical History:   Procedure Laterality Date    APPENDECTOMY  1943    Geraldine Bellis  8/14/09    5mm ascending and sigmoid adenomatous polyps, diverticulosis    COLONOSCOPY,DIAGNOSTIC  10/2/15    sigmoid polyp, diverticulosis    COLONOSCOPY,REMV LESN,SNARE N/A 10/2/2015    Procedure: COLONOSCOPY, POSSIBLE BIOPSY, POSSIBLE POLYPECTOMY 63035;  Surgeon: Ariana Riggins MD;  Location: [address] [place] [address] SURGERY  7/70/42    Umbilical hernia [address] Edmond Li  12/08/2020    [facility]    KNEE REPLACEMENT SURGERY Right 3/06    R TKR  Serafinsabina Diamond    OTHER SURGICAL HISTORY      knee surgery    OTHER SURGICAL HISTORY      doris fx with repair    OTHER SURGICAL HISTORY  1976    Fx R leg ORIF #2 arthotomy procedures    OTHER SURGICAL HISTORY  7-6-11    cysto, dr Boothe [place] HISTORY  8-11-11    cysto, laser litho bladder stone, laser shaving of prostate Dr Jaci Hill  7/21/16    cystoscopy dr Jaci Hill  9/1/16    interstim pne dr Herrera Gee N/A 10/2/2015    Procedure: COLONOSCOPY, POSSIBLE BIOPSY, POSSIBLE POLYPECTOMY 84856;  Surgeon: Ariana Riggins MD;  Location: [address] [place] [address]    [facility] N/A 10/2/2015    Procedure: COLONOSCOPY, POSSIBLE BIOPSY, POSSIBLE POLYPECTOMY 34012;  Surgeon: Ariana Riggins MD;  Location: [place] [address]       (Not in a hospital admission)    No outpatient medications have been marked as taking for the 7/3/23 encounter [facility] HOSPITAL Encounter).      No Known Allergies   Social History     Socioeconomic History    Marital status:    Tobacco Use    Smoking status: Former     Packs/day: 0.50     Years: 2.00     Pack years: 1.00     Types: Cigarettes     Quit date: 1956     Years since quittin.1    Smokeless tobacco: Never   Substance and Sexual Activity    Alcohol use:  Yes     Alcohol/week: 0.0 standard drinks of alcohol     Comment: occasional    Drug use: No   Other Topics Concern    Caffeine Concern Yes       Family History   Problem Relation Age of Onset    Glaucoma Mother     Diabetes Neg         family h/o      Family history negative for lung disease except as noted above     Exam:   23  1500 23  1515 23  1533 23  1615   BP: 94/49 (!) 87/49 (!) 88/48 91/51   Pulse: 82 61 64 58   Resp: 17 20 18 21   Temp:       TempSrc:       SpO2: 95% 96% 95% 95%   Weight:       Height:         General: laying in bed  Skin: no rash, ulcers or subcutaneous nodules  Eyes: anicteric sclerae, moist conjunctivae  Head, ears, nose, throat: atraumatic, oropharynx clear with moist mucous membranes  Neck: trachea midline with no thyromegaly  Heart: regular rate and rhythm, no murmurs / rubs / gallops  Lungs: clear bilaterally, normal respiratory effort, no accessory muscle use  Abdomen: soft, nontender, nondistended  Extremities: no edema or cyanosis  Psych: AAOx3 but slow to respond and forgetful, following commands, moving all extremities without focal deficit    Labs:  Reviewed in EMR    Inpatient Medications:  Reviewed in EMR    Imaging:   Chest imaging reviewed

## 2023-07-03 NOTE — ED QUICK NOTES
Report given to [facility] MEDICAL [facility], RN.  Please call [phone] with any questions

## 2023-07-04 ENCOUNTER — APPOINTMENT (OUTPATIENT)
Dept: CT IMAGING | Facility: HOSPITAL | Age: 88
End: 2023-07-04
Attending: INTERNAL MEDICINE
Payer: MEDICARE

## 2023-07-04 LAB
ANION GAP SERPL CALC-SCNC: 5 MMOL/L (ref 0–18)
BUN BLD-MCNC: 16 MG/DL (ref 7–18)
BUN/CREAT SERPL: 18 (ref 10–20)
CALCIUM BLD-MCNC: 7.8 MG/DL (ref 8.5–10.1)
CHLORIDE SERPL-SCNC: 114 MMOL/L (ref 98–112)
CO2 SERPL-SCNC: 23 MMOL/L (ref 21–32)
CREAT BLD-MCNC: 0.89 MG/DL
DEPRECATED RDW RBC AUTO: 47.9 FL (ref 35.1–46.3)
ERYTHROCYTE [DISTWIDTH] IN BLOOD BY AUTOMATED COUNT: 14.3 % (ref 11–15)
GFR SERPLBLD BASED ON 1.73 SQ M-ARVRAT: 82 ML/MIN/1.73M2 (ref 60–?)
GLUCOSE BLD-MCNC: 141 MG/DL (ref 70–99)
HCT VFR BLD AUTO: 35.2 %
HGB BLD-MCNC: 11.4 G/DL
MCH RBC QN AUTO: 29.5 PG (ref 26–34)
MCHC RBC AUTO-ENTMCNC: 32.4 G/DL (ref 31–37)
MCV RBC AUTO: 91 FL
MRSA DNA SPEC QL NAA+PROBE: POSITIVE
OSMOLALITY SERPL CALC.SUM OF ELEC: 298 MOSM/KG (ref 275–295)
PLATELET # BLD AUTO: 190 10(3)UL (ref 150–450)
POTASSIUM SERPL-SCNC: 3.8 MMOL/L (ref 3.5–5.1)
RBC # BLD AUTO: 3.87 X10(6)UL
SODIUM SERPL-SCNC: 142 MMOL/L (ref 136–145)
WBC # BLD AUTO: 11.2 X10(3) UL (ref 4–11)

## 2023-07-04 PROCEDURE — 74176 CT ABD & PELVIS W/O CONTRAST: CPT | Performed by: INTERNAL MEDICINE

## 2023-07-04 RX ORDER — HALOPERIDOL 5 MG/ML
5 INJECTION INTRAMUSCULAR EVERY 4 HOURS PRN
Status: DISCONTINUED | OUTPATIENT
Start: 2023-07-04 | End: 2023-07-05

## 2023-07-04 RX ORDER — LORAZEPAM 2 MG/ML
1 INJECTION INTRAMUSCULAR ONCE
Status: COMPLETED | OUTPATIENT
Start: 2023-07-04 | End: 2023-07-04

## 2023-07-04 NOTE — PROGRESS NOTES
07/04/23 1312   Clinical Encounter Type   Visited With Patient; Health care provider   Routine Visit Follow-up   Patient's Supportive Strategies/Resources POA/ Advanced Directives   Referral From Nurse   Taxonomy   Intended Effects Promote a sense of peace   Methods Offer support   Interventions Explain  role;Assist someone with Advance Directives     Summary:  received consult for POA/ Advanced Directives. Checked EMR, found none present but POA listed, spoke with RN that indicated current POA wanted to send over documentation. Writer will follow up with family to send over information. Spiritual Care support can be requested via an Epic consult.  visited with patient, they seemed confused, requested RN to turn off [address], Chaplain Resident.

## 2023-07-04 NOTE — PROGRESS NOTES
07/04/23 1523   Advance Directives for Healthcare   Does the patient have:  Power of  for Healthcare   Is there a copy on the chart? Yes   Healthcare Agent Appointed Yes   Healthcare Agent's Name Antonia Anaya Agent's Phone Number [phone]   Advance Directives Counseling Needed? Not needed     Summary: contacted Landry Escudero to send in documentation.  printed scanned copy and placed it in patient's chart, RN notified. Spiritual Care support can be requested via an [facility] consult.    -Stephan Garcias, Chaplain Resident.

## 2023-07-04 NOTE — OCCUPATIONAL THERAPY NOTE
Attempted to see pt for OT evaluation. DIVINA Dixon) reports pt not appropriate for therapy. Pt was agitated earlier and had to receive ativan, haldol, restraints. Will continue to follow.

## 2023-07-04 NOTE — PLAN OF CARE
CT abdomen performed. Could not wean off levophed. PT extremely agitated, trying to climb out of bed and removing medical equipment. Administered ativan one time dose with PRN haloperidol. Turned off Precedex due to bradycardia. Son Jayden Pastor and daughter Catherine Armenta visited and updated on PT status.        Problem: Patient Centered Care  Goal: Patient preferences are identified and integrated in the patient's plan of care  Description: Interventions:  - What would you like us to know as we care for you?   - Provide timely, complete, and accurate information to patient/family  - Incorporate patient and family knowledge, values, beliefs, and cultural backgrounds into the planning and delivery of care  - Encourage patient/family to participate in care and decision-making at the level they choose  - Honor patient and family perspectives and choices  7/4/2023 1837 by Edu Bullock RN  Outcome: Progressing  7/4/2023 1418 by Edu Bullock RN  Outcome: Progressing     Problem: Safety Risk - Non-Violent Restraints  Goal: Patient will remain free from self-harm  Description: INTERVENTIONS:  - Apply the least restrictive restraint to prevent harm  - Notify patient and family of reasons restraints applied  - Assess for any contributing factors to confusion (electrolyte disturbances, delirium, medications)  - Discontinue any unnecessary medical devices as soon as possible  - Assess the patient's physical comfort, circulation, skin condition, hydration, nutrition and elimination needs   - Reorient and redirection as needed  - Assess for the need to continue restraints  7/4/2023 1837 by Edu Bullock RN  Outcome: Progressing  7/4/2023 1418 by Edu Bullock RN  Outcome: Progressing

## 2023-07-04 NOTE — SLP NOTE
ADULT SWALLOWING EVALUATION    ASSESSMENT    ASSESSMENT/OVERALL IMPRESSION:    Orders received, chart reviewed, clinical bedside swallow evaluation complete. Patient admitted from home with dx sepsis secondary to UTI; [facility] and CXR 7/3/23 reviewed as below. No hx SLP service per this EMR review. RN authorizes visit. No family at bedside. Patient received alert and upright in bed, afebrile, in NAD, respiratory parameters stable per monitor. Oral motor mechanism examination grossly unremarkable however with limited patient participation. Patient oriented to self and inconsistently to place, tangential expression, difficult to maintain attention to evaluation tasks at times. Patient self-administered tsps cream of wheat, one bite of French and several cup/straw sips thin liquid juice and water. Oropharyngeal timing and control appears adequate/functional for purees and thin liquids. Oral phase efficiency appears adequate for the one bite of solid, unable to engage patient for additional solid trials despite gentle encouragement. No overt signs aspiration/distress noted throughout the evaluation. Patient presents with minimal evidence oropharyngeal dysphagia at bedside in context of acute illness and altered mentation. and denies odynophagia at this time. At this time, patient appears appropriate to continue PO diet as below with supervision to ensure optimal BEATRIZ/attention for PO safety and to cue for aspiration precautions. Acute SLP service will continue to follow while in house. Plan and recommendations reviewed with patient and RN at bedside. Written recommendations posted on whiteboard. FCM score: 6/7. RECOMMENDATIONS   Diet Recommendations - Solids: Regular  Diet Recommendations - Liquids: Thin Liquids  Aspiration Precautions: Upright position; Slow rate;Small bites and sips  Medication Administration Recommendations: One pill at a time  Treatment Plan/Recommendations: Aspiration precautions  Discharge Recommendations/Plan: Undetermined    HISTORY   MEDICAL HISTORY  Reason for Referral: RN dysphagia screen    Problem List  Principal Problem:    Fever, unspecified fever cause    Past Medical History  Past Medical History:   Diagnosis Date    Benign prostatic hypertrophy     Blepharitis 2005    OU    Cataract 2002    OD    Cataract 2002    OS    Chalazion of left upper eyelid 6/16/2009    OS    Diverticulitis 7/15    Floaters 2008    OS    KIDNEY STONE 1977    MGD (meibomian gland dysfunction) 2008    OU    Neurogenic bladder     self caths TID    Other and unspecified hyperlipidemia     OTHER DISEASES     Hypogonadism    Pinguecula 2005    OU     Prior Living Situation: Assisted living; Independent living  Diet Prior to Admission: Regular; Thin liquids  Precautions: Aspiration    Patient/Family Goals: \"How do I get out of here? \"    SWALLOWING HISTORY  Current Diet Consistency: Regular; Thin liquids  Dysphagia History: As above    Imaging Results:     [address] [place] [address] 7/3/23:  CONCLUSION:   1. No acute intracranial process by noncontrast CT technique. 2. Intracranial atherosclerosis. CXR 7/3/23:  CONCLUSION: Mild bibasilar atelectasis. Otherwise, no radiographic evidence of acute cardiopulmonary abnormality. OBJECTIVE     Dentition: Natural;Functional  Symmetry: Within Functional Limits  Strength: Within Functional Limits  Tone: Within Functional Limits  Range of Motion: Within Functional Limits  Rate of Motion: Within Functional Limits    Voice Quality: Clear;Weak  Respiratory Status: Unlabored  Consistencies Trialed: Thin liquids; Soft solid;Puree  Method of Presentation: Self presentation  Patient Positioning: Upright;Midline    Oral Phase of Swallow: Within Functional Limits     Pharyngeal Phase of Swallow: Within Functional Limits    (Please note: Silent aspiration cannot be evaluated clinically.  Videofluoroscopic Swallow Study is required to rule-out silent aspiration.)    Esophageal Phase of Swallow: No complaints consistent with possible esophageal involvement      GOALS  Goal #1 Patient will demonstrate safe and efficient clinical tolerance for regular diet and thin liquids without overt signs aspiration/distress x1-2 f/u visits. In Progress   Goal #2 The patient/family/caregiver will demonstrate understanding and implementation of aspiration precautions and swallow strategies independently over 1-2 session(s).     In Progress       FOLLOW UP  Treatment Plan/Recommendations: Aspiration precautions  Number of Visits to Meet Established Goals: 2  Follow Up Needed (Documentation Required): Yes  SLP Follow-up Date: 07/05/23    Thank you for your referral.   If you have any questions, please contact Sindi Marmolejo, SLP  Sindi Marmolejo M.S. [facility] Pathologist  [phone]

## 2023-07-04 NOTE — PLAN OF CARE
Patient confused. Agitated, restless, yelling at staff, multiple attempts to get out of bed, pulling at IV site and monitoring devices. Diversionary and alternative methods in effective. Restraints on at time order placed. Unable to contact family to notify.  No signs of inury        Problem: Safety Risk - Non-Violent Restraints  Goal: Patient will remain free from self-harm  Description: INTERVENTIONS:  - Apply the least restrictive restraint to prevent harm  - Notify patient and family of reasons restraints applied  - Assess for any contributing factors to confusion (electrolyte disturbances, delirium, medications)  - Discontinue any unnecessary medical devices as soon as possible  - Assess the patient's physical comfort, circulation, skin condition, hydration, nutrition and elimination needs   - Reorient and redirection as needed  - Assess for the need to continue restraints  Outcome: Not Progressing

## 2023-07-05 ENCOUNTER — APPOINTMENT (OUTPATIENT)
Dept: CV DIAGNOSTICS | Facility: HOSPITAL | Age: 88
End: 2023-07-05
Attending: INTERNAL MEDICINE
Payer: MEDICARE

## 2023-07-05 LAB
ANION GAP SERPL CALC-SCNC: 4 MMOL/L (ref 0–18)
ATRIAL RATE: 69 BPM
BUN BLD-MCNC: 16 MG/DL (ref 7–18)
BUN/CREAT SERPL: 18.6 (ref 10–20)
CALCIUM BLD-MCNC: 8 MG/DL (ref 8.5–10.1)
CHLORIDE SERPL-SCNC: 116 MMOL/L (ref 98–112)
CO2 SERPL-SCNC: 23 MMOL/L (ref 21–32)
CREAT BLD-MCNC: 0.86 MG/DL
DEPRECATED RDW RBC AUTO: 49.3 FL (ref 35.1–46.3)
ERYTHROCYTE [DISTWIDTH] IN BLOOD BY AUTOMATED COUNT: 14.5 % (ref 11–15)
GFR SERPLBLD BASED ON 1.73 SQ M-ARVRAT: 83 ML/MIN/1.73M2 (ref 60–?)
GLUCOSE BLD-MCNC: 105 MG/DL (ref 70–99)
HCT VFR BLD AUTO: 34.2 %
HGB BLD-MCNC: 11.2 G/DL
LIPASE SERPL-CCNC: 23 U/L (ref 13–75)
MCH RBC QN AUTO: 30.1 PG (ref 26–34)
MCHC RBC AUTO-ENTMCNC: 32.7 G/DL (ref 31–37)
MCV RBC AUTO: 91.9 FL
OSMOLALITY SERPL CALC.SUM OF ELEC: 298 MOSM/KG (ref 275–295)
P AXIS: 86 DEGREES
P-R INTERVAL: 190 MS
PLATELET # BLD AUTO: 179 10(3)UL (ref 150–450)
POTASSIUM SERPL-SCNC: 3.8 MMOL/L (ref 3.5–5.1)
Q-T INTERVAL: 434 MS
QRS DURATION: 84 MS
QTC CALCULATION (BEZET): 465 MS
R AXIS: 2 DEGREES
RBC # BLD AUTO: 3.72 X10(6)UL
SODIUM SERPL-SCNC: 143 MMOL/L (ref 136–145)
T AXIS: -7 DEGREES
VENTRICULAR RATE: 69 BPM
WBC # BLD AUTO: 8.6 X10(3) UL (ref 4–11)

## 2023-07-05 PROCEDURE — 93306 TTE W/DOPPLER COMPLETE: CPT | Performed by: INTERNAL MEDICINE

## 2023-07-05 PROCEDURE — 36410 VNPNXR 3YR/> PHY/QHP DX/THER: CPT | Performed by: NURSE PRACTITIONER

## 2023-07-05 RX ORDER — HALOPERIDOL 5 MG/ML
1 INJECTION INTRAMUSCULAR EVERY 4 HOURS PRN
Status: DISCONTINUED | OUTPATIENT
Start: 2023-07-05 | End: 2023-07-18

## 2023-07-05 RX ORDER — TAMSULOSIN HYDROCHLORIDE 0.4 MG/1
0.4 CAPSULE ORAL
Status: DISCONTINUED | OUTPATIENT
Start: 2023-07-05 | End: 2023-07-19

## 2023-07-05 RX ORDER — LORAZEPAM 2 MG/ML
0.5 INJECTION INTRAMUSCULAR ONCE
Status: DISCONTINUED | OUTPATIENT
Start: 2023-07-05 | End: 2023-07-18

## 2023-07-05 NOTE — PLAN OF CARE
Pt transferred from CCU with Dx of fever and altered mental status. VS stable at this time. Skin checked with another caregiver. Pt belongings placed in closet. Pt oriented to room and use of call light.

## 2023-07-05 NOTE — PROGRESS NOTES
07/05/23 1338   Wound 03/25/22 Abrasion Leg Left; Lower; Anterior   Date First Assessed/Time First Assessed: 03/25/22 2345   Present on Hospital Admission: Yes  Primary Wound Type: Abrasion  Location: Leg  Wound Location Orientation: Left; Lower; Anterior   Wound Image    Site Assessment Clean;Dry;Fragile   Closure Approximated   Drainage Amount None   Drainage Description Scabbed   Treatments Site Care;Topical (Barrier/Moisturizer/Ointment)   Dressing Open to air   Non-staged Wound Description Not applicable   Colleen-wound Assessment Clean;Dry; Intact   Wound Odor None   State of Healing Epithelialized   Wound 07/03/23 Abrasion Knee Anterior;Right   Date First Assessed/Time First Assessed: 07/03/23 1900   Primary Wound Type: Abrasion  Location: Knee  Wound Location Orientation: Anterior;Right   Wound Image    Site Assessment Clean;Dry;Fragile; Purple;Red   Closure Approximated   Drainage Amount None   Drainage Description Scabbed   Treatments Site Care;Topical (Barrier/Moisturizer/Ointment)   Dressing Open to air   Non-staged Wound Description Not applicable   Colleen-wound Assessment Clean;Dry; Intact   Wound Odor None   Wound Follow Up   Follow up needed No     Pt seen sitting up in bed, posey vest in place, pt is confused at this time. Per admitting rn wound request- \"Per POA, patient fell within past month. Skin injury to legs. Wounds present upon admission BLE. POA does not believe the abrasions/wounds have been treated at home by Independent living staff\". SEE above for scabs. No wound RN is needed at this time. Wounds are healing, Vaseline applied to intact scabs. Updated bedside RN.

## 2023-07-05 NOTE — SLP NOTE
SPEECH DAILY NOTE - INPATIENT    ASSESSMENT & PLAN   ASSESSMENT  PPE REQUIRED. THIS THERAPIST WORE GLOVES, DROPLET MASK, AND GOGGLES FOR DURATION OF EVALUATION. HANDS WASHED UPON ENTRANCE/EXIT. SLP f/u for ongoing dysphagia tx/meal assessment per recommendations of regular/thin per BSE. RN reports pt tolerates diet and medication well with no overt clinical s/s aspiration. Pt denies any swallowing challenges. Pt positioned upright in bed with bilateral wrist restraints present. Pt afebrile, tolerating room air with oxygen status 97 prior to the start of oral trials. SLP reviewed aspiration precautions and safe swallowing compensatory strategies with the patient. Safe swallow guidelines remain written on the white board in purple. Diet recommendations remain on the whiteboard in the room. Patient's RN v/u. Provided 1:1 feed assistance, pt tolerates regular and thin liquids via open cup with no overt clinical signs/symptoms of aspiration. Oxygen status remained >96 t/o the entire session. Respiratory Rate WFL. Oral/buccal cavities clear of residue following all trials. MOST RECENT CXR 7/3/23:  CONCLUSION: Mild bibasilar atelectasis. Otherwise, no radiographic evidence of acute cardiopulmonary abnormality. Dictated by (CST): Frankey Bis MD on 7/03/2023 at 6:10 PM       Finalized by (CST): Frankey Bis MD on 7/03/2023 at 6:12 PM       PLAN: SLP to sign off at this time secondary to pt tolerating least restrictive diet with no CSA. Diet Recommendations - Solids: Regular  Diet Recommendations - Liquids: Thin Liquids       Aspiration Precautions: Upright position; Slow rate;Small bites and sips  Medication Administration Recommendations: One pill at a time      Discharge Recommendations  Discharge Recommendations/Plan: Undetermined    Treatment Plan  Treatment Plan/Recommendations: Aspiration precautions    Interdisciplinary Communication: Discussed with RN  Recommendations posted at bedside GOALS  Goal #1 Patient will demonstrate safe and efficient clinical tolerance for regular diet and thin liquids without overt signs aspiration/distress x1-2 f/u visits. Pt tolerates regular/thin consistencies with no overt signs/symptoms of aspiration observed. Goal met   Goal #2 The patient/family/caregiver will demonstrate understanding and implementation of aspiration precautions and swallow strategies independently over 1-2 session(s). Education provided regarding aspiration precautions and safe swallow strategies. RN v/u to all recommendations. Goal met     FOLLOW UP  Follow Up Needed (Documentation Required): No  SLP Follow-up Date: 07/05/23  Number of Visits to Meet Established Goals: 0    Session: 1 following BSE    If you have any questions, please contact Jesusita Woods, SLP    Amy M. Overton Richardson Pearle Apley  Speech Language Pathologist  Phone Number Ext. [phone]

## 2023-07-05 NOTE — PHYSICAL THERAPY NOTE
PHYSICAL THERAPY EVALUATION - INPATIENT     Room Number: 202/202-A  Evaluation Date: 7/5/2023  Type of Evaluation: Initial   Physician Order: PT Eval and Treat    Presenting Problem: UTI, fever, AMS, tachycardia  Co-Morbidities : BPH, HLD  Reason for Therapy: Mobility Dysfunction and Discharge Planning    PHYSICAL THERAPY ASSESSMENT     Patient is a 80year old male admitted 7/3/2023 for UTI, AMS, fever. Patient received semi-fowlers in bed, agreeable to physical therapy evaluation. Vital signs monitored as noted below, no adverse symptoms and patient stable during session. Pt pleasantly confused but cooperative, hard of hearing requiring increased repetition and gestures to follow commands. Pt tolerating short household distances with rolling walker, Dylan for sequencing, body mechanics, and walker management,     MOBILITY:  SUPINE TO SIT: minimal assist - head of bed elevated, assist for sequencing  SIT TO SUPINE: contact guard assist   SIT TO STAND: minimal assist - cues for hand placement, delayed hip extension  STAND TO SIT: minimal assist - assist to fully align with surface  GAIT: minimal assist - 40's with rolling walker, very slow pace, shuffling steps with decreased step length, decreased BLE knee and hip extension, verbal and tactile cues to facilitate upright posture, Dylan to manage walker safely   STAIR NEGOTIATION: not tested - none at home    Patient is currently functioning below baseline with bed mobility, transfers, and gait as a result of the following impairments: decreased endurance/aerobic capacity, impaired standing balance, impaired motor planning, and cognitive deficits. Patient history and/or personal factors that may impact the plan of care include cognitive deficits.  Based on the physical therapy examination of the noted systems and functional activity/participation limitations, the patient presentation is evolving given the patient demonstrates worsening of previously stable condition, demonstrates worsening of co-morbidities, and demonstrates cognitive skills affecting safety. Patient would benefit from continued skilled physical therapy interventions to maximize patient safety and functional independence. Updated nurse on results of session, patient left semifowlers in bed with restraints reapplied, all lines intact, all needs met with call light in reach. The patient's Approx Degree of Impairment: 54.16% has been calculated based on documentation in the [facility] '6 clicks' Inpatient Basic Mobility Short Form. Research supports that patients with this level of impairment may benefit from subacute rehabilitation as patient is functioning below baseline level. Patient will benefit from continued IP PT services to address these deficits in preparation for discharge. DISCHARGE RECOMMENDATIONS  PT Discharge Recommendations: Sub-acute rehabilitation    PLAN  PT Treatment Plan: Body mechanics; Coordination; Endurance; Patient education;Gait training;Neuromuscular re-educate;Strengthening;Transfer training;Balance training  Rehab Potential : Fair  Frequency (Obs): 3-5x/week       PHYSICAL THERAPY MEDICAL/SOCIAL HISTORY     Problem List  Principal Problem:    Fever, unspecified fever cause      HOME SITUATION  Home Situation  Type of Home: [facility]isted living [facility]  Lives With: Alone  Patient Owned Equipment: Rolling walker     Prior Level of Newman: independent no assistive device, has possibly used a walker in the past (?), assist with IADLs, Kathleen with ADLs    SUBJECTIVE  \"I don't really have a taste for a beer right now, but I guess if you're offering. \"    PHYSICAL THERAPY EXAMINATION     OBJECTIVE  Precautions: Hard of hearing; Restraints;Bed/chair alarm  Fall Risk: High fall risk    WEIGHT BEARING RESTRICTION  None    PAIN ASSESSMENT  Ratin          COGNITION  Overall Cognitive Status:  Impaired    RANGE OF MOTION AND STRENGTH ASSESSMENT  Upper extremity ROM and strength are within functional limits  BUEs  Lower extremity ROM is within functional limits  BLEs  Lower extremity strength is within functional limits  BLEs, deconditioned, limited command following    BALANCE  Static Sitting: Fair +  Dynamic Sitting: Fair  Static Standing: Fair  Dynamic Standing: Fair -      ACTIVITY TOLERANCE  Pulse: 79 (at rest, 90s with gait)  Heart Rate Source: Monitor     BP: (!) 115/94 (pre, 159/118 post, asymptomatic, moving last reading likely inaccurate)  BP Location: Left arm  BP Method: Automatic  Patient Position: Semi-Amin    O2 WALK  Oxygen Therapy  SPO2% on Room Air at Rest: 96  SPO2% Ambulation on Room Air: 92    AM-PAC '6-Clicks' INPATIENT SHORT FORM - BASIC MOBILITY  How much difficulty does the patient currently have. .. Patient Difficulty: Turning over in bed (including adjusting bedclothes, sheets and blankets)?: A Little   Patient Difficulty: Sitting down on and standing up from a chair with arms (e.g., wheelchair, bedside commode, etc.): A Little   Patient Difficulty: Moving from lying on back to sitting on the side of the bed?: A Little   How much help from another person does the patient currently need. ..    Help from Another: Moving to and from a bed to a chair (including a wheelchair)?: A Little   Help from Another: Need to walk in hospital room?: A Little   Help from Another: Climbing 3-5 steps with a railing?: Total     AM-PAC Score:  Raw Score: 16   Approx Degree of Impairment: 54.16%   Standardized Score (AM-PAC Scale): 40.78   CMS Modifier (G-Code): CK    FUNCTIONAL ABILITY STATUS  Functional Mobility/Gait Assessment  Gait Assistance: Minimum assistance;Contact guard assist  Distance (ft): 40'  Assistive Device: Rolling walker  Pattern: Shuffle;R Flexed knee;L Flexed knee (slow pace, shuffling steps with decreased step length, decreased BLE knee and hip extension, verbal and tactile cues to facilitate upright posture, Dylan to manage walker safely)      Exercise/Education Provided:  Bed mobility  Body mechanics  Functional activity tolerated  Gait training  Posture  Transfer training    Patient End of Session: In bed;Needs met;Call light within reach;RN aware of session/findings; Alarm set; Restraints    CURRENT GOALS    Goals to be met by: 7/15/23  Patient Goal Patient's self-stated goal is: maximize functional independence and safety   Goal #1 Patient is able to demonstrate supine - sit EOB @ level: supervision     Goal #1   Current Status    Goal #2 Patient is able to demonstrate transfers EOB to/from [facility] at assistance level: supervision with walker - rolling     Goal #2  Current Status    Goal #3 Patient is able to ambulate 50 feet with assist device: walker - rolling at assistance level: contact guard assistance   Goal #3   Current Status    Goal #4 Patient will negotiate 1 stairs/one curb w/ assistive device and supervision   Goal #4   Current Status    Goal #5 Patient to demonstrate independence with home activity/exercise instructions provided to patient in preparation for discharge.    Goal #5   Current Status    Goal #6    Goal #6  Current Status      Patient Evaluation Complexity Level:  History Moderate - 1 or 2 personal factors and/or co-morbidities   Examination of body systems Moderate - addressing a total of 3 or more elements   Clinical Presentation Moderate - Evolving   Clinical Decision Making Moderate Complexity       Gait Trainin minutes  Therapeutic Activity: 10 minutes      Sami Fry, PT, DPT  [facility]  Inpatient Rehabilitation  Physical Therapy  [phone]

## 2023-07-05 NOTE — CM/SW NOTE
SW spoke to pt's son/POA regarding PT recommendation of SNF. [address] and son would like pt to discharge to SNF side. SW uploaded referral/PASRR via [employer]. SW/JULES to remain available for support and/or discharge planning.       Cayetano Dakins, MSW, [facility]  Social Work   IKR:#[phone]

## 2023-07-06 PROBLEM — F05 DELIRIUM DUE TO ANOTHER MEDICAL CONDITION: Status: ACTIVE | Noted: 2023-07-06

## 2023-07-06 PROBLEM — F39 EPISODIC MOOD DISORDER (HCC): Status: ACTIVE | Noted: 2023-07-06

## 2023-07-06 PROBLEM — F39 EPISODIC MOOD DISORDER: Status: ACTIVE | Noted: 2023-07-06

## 2023-07-06 LAB
ANION GAP SERPL CALC-SCNC: 4 MMOL/L (ref 0–18)
ANION GAP SERPL CALC-SCNC: 8 MMOL/L (ref 0–18)
ATRIAL RATE: 66 BPM
ATRIAL RATE: 91 BPM
BUN BLD-MCNC: 10 MG/DL (ref 7–18)
BUN BLD-MCNC: 8 MG/DL (ref 7–18)
BUN/CREAT SERPL: 11.6 (ref 10–20)
BUN/CREAT SERPL: 14.5 (ref 10–20)
CALCIUM BLD-MCNC: 7.8 MG/DL (ref 8.5–10.1)
CALCIUM BLD-MCNC: 8 MG/DL (ref 8.5–10.1)
CHLORIDE SERPL-SCNC: 114 MMOL/L (ref 98–112)
CHLORIDE SERPL-SCNC: 115 MMOL/L (ref 98–112)
CO2 SERPL-SCNC: 20 MMOL/L (ref 21–32)
CO2 SERPL-SCNC: 23 MMOL/L (ref 21–32)
CREAT BLD-MCNC: 0.69 MG/DL
CREAT BLD-MCNC: 0.69 MG/DL
DEPRECATED RDW RBC AUTO: 45.9 FL (ref 35.1–46.3)
ERYTHROCYTE [DISTWIDTH] IN BLOOD BY AUTOMATED COUNT: 14.2 % (ref 11–15)
GFR SERPLBLD BASED ON 1.73 SQ M-ARVRAT: 89 ML/MIN/1.73M2 (ref 60–?)
GFR SERPLBLD BASED ON 1.73 SQ M-ARVRAT: 89 ML/MIN/1.73M2 (ref 60–?)
GLUCOSE BLD-MCNC: 107 MG/DL (ref 70–99)
GLUCOSE BLD-MCNC: 113 MG/DL (ref 70–99)
HCT VFR BLD AUTO: 34.8 %
HGB BLD-MCNC: 11.6 G/DL
MAGNESIUM SERPL-MCNC: 1.9 MG/DL (ref 1.6–2.6)
MCH RBC QN AUTO: 29.5 PG (ref 26–34)
MCHC RBC AUTO-ENTMCNC: 33.3 G/DL (ref 31–37)
MCV RBC AUTO: 88.5 FL
OSMOLALITY SERPL CALC.SUM OF ELEC: 292 MOSM/KG (ref 275–295)
OSMOLALITY SERPL CALC.SUM OF ELEC: 295 MOSM/KG (ref 275–295)
P AXIS: 77 DEGREES
P AXIS: 82 DEGREES
P-R INTERVAL: 160 MS
P-R INTERVAL: 166 MS
PHOSPHATE SERPL-MCNC: 2 MG/DL (ref 2.5–4.9)
PLATELET # BLD AUTO: 218 10(3)UL (ref 150–450)
POTASSIUM SERPL-SCNC: 3.4 MMOL/L (ref 3.5–5.1)
POTASSIUM SERPL-SCNC: 3.5 MMOL/L (ref 3.5–5.1)
POTASSIUM SERPL-SCNC: 3.5 MMOL/L (ref 3.5–5.1)
Q-T INTERVAL: 366 MS
Q-T INTERVAL: 384 MS
QRS DURATION: 86 MS
QRS DURATION: 92 MS
QTC CALCULATION (BEZET): 402 MS
QTC CALCULATION (BEZET): 450 MS
R AXIS: 14 DEGREES
R AXIS: 8 DEGREES
RBC # BLD AUTO: 3.93 X10(6)UL
SODIUM SERPL-SCNC: 141 MMOL/L (ref 136–145)
SODIUM SERPL-SCNC: 143 MMOL/L (ref 136–145)
T AXIS: 18 DEGREES
T AXIS: 23 DEGREES
VENTRICULAR RATE: 66 BPM
VENTRICULAR RATE: 91 BPM
WBC # BLD AUTO: 8.3 X10(3) UL (ref 4–11)

## 2023-07-06 PROCEDURE — 90792 PSYCH DIAG EVAL W/MED SRVCS: CPT | Performed by: OTHER

## 2023-07-06 RX ORDER — HALOPERIDOL 5 MG/ML
1 INJECTION INTRAMUSCULAR ONCE
Status: DISCONTINUED | OUTPATIENT
Start: 2023-07-06 | End: 2023-07-18

## 2023-07-06 RX ORDER — METOPROLOL TARTRATE 5 MG/5ML
5 INJECTION INTRAVENOUS EVERY 6 HOURS
Status: DISCONTINUED | OUTPATIENT
Start: 2023-07-06 | End: 2023-07-07

## 2023-07-06 RX ORDER — POTASSIUM CHLORIDE 14.9 MG/ML
20 INJECTION INTRAVENOUS ONCE
Status: COMPLETED | OUTPATIENT
Start: 2023-07-06 | End: 2023-07-06

## 2023-07-06 RX ORDER — OLANZAPINE 2.5 MG/1
2.5 TABLET ORAL EVERY EVENING
Status: DISCONTINUED | OUTPATIENT
Start: 2023-07-06 | End: 2023-07-07

## 2023-07-06 RX ORDER — QUETIAPINE FUMARATE 25 MG/1
25 TABLET, FILM COATED ORAL NIGHTLY
Status: DISCONTINUED | OUTPATIENT
Start: 2023-07-06 | End: 2023-07-06

## 2023-07-06 RX ORDER — VANCOMYCIN HYDROCHLORIDE 50 MG/ML
125 KIT ORAL DAILY
Status: DISCONTINUED | OUTPATIENT
Start: 2023-07-06 | End: 2023-07-19

## 2023-07-06 RX ORDER — DILTIAZEM HYDROCHLORIDE 5 MG/ML
10 INJECTION INTRAVENOUS ONCE
Status: DISCONTINUED | OUTPATIENT
Start: 2023-07-06 | End: 2023-07-06

## 2023-07-06 RX ORDER — HALOPERIDOL 5 MG/ML
INJECTION INTRAMUSCULAR
Status: DISCONTINUED
Start: 2023-07-06 | End: 2023-07-06 | Stop reason: WASHOUT

## 2023-07-06 RX ORDER — RISPERIDONE 0.5 MG/1
0.5 TABLET ORAL DAILY
Status: DISCONTINUED | OUTPATIENT
Start: 2023-07-06 | End: 2023-07-06

## 2023-07-06 RX ORDER — ARIPIPRAZOLE 2 MG/1
1 TABLET ORAL DAILY
Status: DISCONTINUED | OUTPATIENT
Start: 2023-07-06 | End: 2023-07-07

## 2023-07-06 RX ORDER — METOPROLOL TARTRATE 5 MG/5ML
5 INJECTION INTRAVENOUS ONCE
Status: COMPLETED | OUTPATIENT
Start: 2023-07-06 | End: 2023-07-06

## 2023-07-06 RX ORDER — SODIUM CHLORIDE, SODIUM LACTATE, POTASSIUM CHLORIDE, CALCIUM CHLORIDE 600; 310; 30; 20 MG/100ML; MG/100ML; MG/100ML; MG/100ML
INJECTION, SOLUTION INTRAVENOUS CONTINUOUS
Status: DISCONTINUED | OUTPATIENT
Start: 2023-07-06 | End: 2023-07-08

## 2023-07-06 RX ORDER — METOPROLOL TARTRATE 5 MG/5ML
INJECTION INTRAVENOUS
Status: COMPLETED
Start: 2023-07-06 | End: 2023-07-06

## 2023-07-06 NOTE — BH PROGRESS NOTE
Collateral Note:     Fabrice Stephen, youngest son and POA, provided collateral information with regard to his father. Son states he and his siblings believe pt has become increasingly forgetful such as forgetting to pay bills and often misplacing items over the past month. Son reports pt would become very upset, angry and irritable, over minor issues which was not like him. Son states pt and his wife have resided in [place] [address] at [facility] over 12 years but pt's wife moved to the memory care facility of [facility] about a 1.5 years ago. Patient and wife were  over 61 years and have 3 children together. 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 Son reports pt did not have any mental health symptoms nor did he abuse alcohol or substances. Son denies he had any concerns for father's wellbeing until the past month. Son inquired to medications psychiatrist prescribing and asked what the discharge plan was for his father.

## 2023-07-07 LAB
ANION GAP SERPL CALC-SCNC: 10 MMOL/L (ref 0–18)
BUN BLD-MCNC: 7 MG/DL (ref 7–18)
BUN/CREAT SERPL: 11.9 (ref 10–20)
CALCIUM BLD-MCNC: 8.3 MG/DL (ref 8.5–10.1)
CHLORIDE SERPL-SCNC: 115 MMOL/L (ref 98–112)
CO2 SERPL-SCNC: 19 MMOL/L (ref 21–32)
CREAT BLD-MCNC: 0.59 MG/DL
DEPRECATED RDW RBC AUTO: 44.5 FL (ref 35.1–46.3)
ERYTHROCYTE [DISTWIDTH] IN BLOOD BY AUTOMATED COUNT: 13.9 % (ref 11–15)
GFR SERPLBLD BASED ON 1.73 SQ M-ARVRAT: 93 ML/MIN/1.73M2 (ref 60–?)
GLUCOSE BLD-MCNC: 97 MG/DL (ref 70–99)
HCT VFR BLD AUTO: 36.3 %
HGB BLD-MCNC: 12.3 G/DL
MCH RBC QN AUTO: 29.6 PG (ref 26–34)
MCHC RBC AUTO-ENTMCNC: 33.9 G/DL (ref 31–37)
MCV RBC AUTO: 87.3 FL
OSMOLALITY SERPL CALC.SUM OF ELEC: 296 MOSM/KG (ref 275–295)
PHOSPHATE SERPL-MCNC: 1.9 MG/DL (ref 2.5–4.9)
PLATELET # BLD AUTO: 227 10(3)UL (ref 150–450)
POTASSIUM SERPL-SCNC: 3.2 MMOL/L (ref 3.5–5.1)
POTASSIUM SERPL-SCNC: 3.6 MMOL/L (ref 3.5–5.1)
Q-T INTERVAL: 364 MS
QRS DURATION: 80 MS
QTC CALCULATION (BEZET): 478 MS
R AXIS: 10 DEGREES
RBC # BLD AUTO: 4.16 X10(6)UL
SODIUM SERPL-SCNC: 144 MMOL/L (ref 136–145)
T AXIS: 30 DEGREES
VENTRICULAR RATE: 104 BPM
WBC # BLD AUTO: 8 X10(3) UL (ref 4–11)

## 2023-07-07 PROCEDURE — 99233 SBSQ HOSP IP/OBS HIGH 50: CPT | Performed by: OTHER

## 2023-07-07 RX ORDER — METOPROLOL TARTRATE 5 MG/5ML
5 INJECTION INTRAVENOUS EVERY 6 HOURS PRN
Status: DISCONTINUED | OUTPATIENT
Start: 2023-07-07 | End: 2023-07-19

## 2023-07-07 RX ORDER — OLANZAPINE 5 MG/1
5 TABLET ORAL EVERY EVENING
Status: DISCONTINUED | OUTPATIENT
Start: 2023-07-07 | End: 2023-07-10

## 2023-07-07 RX ORDER — POTASSIUM CHLORIDE 14.9 MG/ML
20 INJECTION INTRAVENOUS ONCE
Status: COMPLETED | OUTPATIENT
Start: 2023-07-07 | End: 2023-07-07

## 2023-07-07 NOTE — PHYSICAL THERAPY NOTE
Attempted to see pt this afternoon, chart reviewed. RN requesting hold for today, pt currently in restraints 2/2 agitated aggression. Will follow up as schedule allows and pt is available and appropriate for therapy.     [address] [place] [address], [address] [place] [address]  [phone]

## 2023-07-07 NOTE — PLAN OF CARE
Problem: Safety Risk - Non-Violent Restraints  Goal: Patient will remain free from self-harm  Description: INTERVENTIONS:  - Apply the least restrictive restraint to prevent harm  - Notify patient and family of reasons restraints applied  - Assess for any contributing factors to confusion (electrolyte disturbances, delirium, medications)  - Discontinue any unnecessary medical devices as soon as possible  - Assess the patient's physical comfort, circulation, skin condition, hydration, nutrition and elimination needs   - Reorient and redirection as needed  - Assess for the need to continue restraints  7/6/2023 1900 by Floyd Boyce RN  Outcome: Progressing

## 2023-07-08 LAB
ANION GAP SERPL CALC-SCNC: 10 MMOL/L (ref 0–18)
BUN BLD-MCNC: 8 MG/DL (ref 7–18)
BUN/CREAT SERPL: 12.7 (ref 10–20)
CALCIUM BLD-MCNC: 8.2 MG/DL (ref 8.5–10.1)
CHLORIDE SERPL-SCNC: 114 MMOL/L (ref 98–112)
CO2 SERPL-SCNC: 23 MMOL/L (ref 21–32)
CREAT BLD-MCNC: 0.63 MG/DL
DEPRECATED RDW RBC AUTO: 45.3 FL (ref 35.1–46.3)
ERYTHROCYTE [DISTWIDTH] IN BLOOD BY AUTOMATED COUNT: 13.9 % (ref 11–15)
GFR SERPLBLD BASED ON 1.73 SQ M-ARVRAT: 91 ML/MIN/1.73M2 (ref 60–?)
GLUCOSE BLD-MCNC: 90 MG/DL (ref 70–99)
HCT VFR BLD AUTO: 35.7 %
HGB BLD-MCNC: 11.8 G/DL
MCH RBC QN AUTO: 29.2 PG (ref 26–34)
MCHC RBC AUTO-ENTMCNC: 33.1 G/DL (ref 31–37)
MCV RBC AUTO: 88.4 FL
OSMOLALITY SERPL CALC.SUM OF ELEC: 302 MOSM/KG (ref 275–295)
PHOSPHATE SERPL-MCNC: 2.7 MG/DL (ref 2.5–4.9)
PLATELET # BLD AUTO: 244 10(3)UL (ref 150–450)
POTASSIUM SERPL-SCNC: 3.4 MMOL/L (ref 3.5–5.1)
RBC # BLD AUTO: 4.04 X10(6)UL
SODIUM SERPL-SCNC: 147 MMOL/L (ref 136–145)
WBC # BLD AUTO: 7.6 X10(3) UL (ref 4–11)

## 2023-07-08 RX ORDER — POTASSIUM CHLORIDE 1.5 G/1.58G
40 POWDER, FOR SOLUTION ORAL ONCE
Status: COMPLETED | OUTPATIENT
Start: 2023-07-08 | End: 2023-07-08

## 2023-07-08 RX ORDER — DEXTROSE AND SODIUM CHLORIDE 5; .45 G/100ML; G/100ML
INJECTION, SOLUTION INTRAVENOUS CONTINUOUS
Status: DISCONTINUED | OUTPATIENT
Start: 2023-07-08 | End: 2023-07-15

## 2023-07-08 NOTE — SLP NOTE
ADULT SWALLOWING EVALUATION    ASSESSMENT    ASSESSMENT/OVERALL IMPRESSION:    Orders received, chart reviewed, clinical bedside swallow re-evaluation complete. RN/MD requests SLP re-evaluation due to report of \"difficulty with diet\". Current shift RN reports tolerance for current diet and medication administrations despite continued fluctuations in mentation. Patient initially admitted from home 7/3/23 with dx sepsis secondary to UTI; [facility] and CXR 7/3/23 reviewed as below. No hx SLP service per this EMR review. No family at bedside. Patient received alert and upright in bed, afebrile, in NAD, posey vest and bilateral soft wrist restraints donned. Oral motor mechanism examination grossly unremarkable however with limited patient participation. Patient oriented to self and inconsistently to place/time, tangential expression, difficult to maintain attention to evaluation tasks at times. Despite confusion, BEATRIZ/engagement remained appropriate throughout and patient accepted bites of solid dry crackers and straw sips thin liquid water with oropharyngeal timing and control which appears adequate/functional and without overt signs aspiration/distress. Patient continues to present with minimal evidence oropharyngeal dysphagia at bedside in context of acute illness and altered mentation. At this time, patient appears appropriate to continue PO diet as below with supervision to ensure optimal BEATRIZ/attention for PO safety and to cue for aspiration precautions. No further SLP service appears indicated, will sign off. Given advanced age and cognitive impairments, patient may benefit from SLP support/service in next level of care pending discharge plans. Plan and recommendations reviewed with patient and RN at bedside. Written recommendations posted on whiteboard. FCM score: 6/7. RECOMMENDATIONS   Diet Recommendations - Solids: Regular  Diet Recommendations - Liquids:  Thin Liquids  Compensatory Strategies Recommended: Slow rate;Small bites and sips; Alternate consistencies  Aspiration Precautions: Upright position; Slow rate;Small bites and sips  Medication Administration Recommendations: One pill at a time  Treatment Plan/Recommendations: No further inpatient SLP service warranted  Discharge Recommendations/Plan: Undetermined    HISTORY   MEDICAL HISTORY  Reason for Referral: RN dysphagia screen    Problem List  Principal Problem:    Fever, unspecified fever cause  Active Problems:    Delirium due to another medical condition    Episodic mood disorder [facility])      Past Medical History  Past Medical History:   Diagnosis Date    Benign prostatic hypertrophy     Blepharitis 2005    [place]    Cataract 2002    OD    Cataract 2002    OS    Chalazion of left upper eyelid 6/16/2009    OS    Diverticulitis 7/15    Floaters 2008    [address] [facility]    MGD (meibomian gland dysfunction) 2008    [place]    Neurogenic bladder     self caths TID    Other and unspecified hyperlipidemia     OTHER DISEASES     Hypogonadism    Pinguecula 2005    [place]       Prior Living Situation: Assisted living; Independent living  Diet Prior to Admission: Regular; Thin liquids  Precautions: Aspiration    Patient/Family Goals: \"Why am I here? \"    SWALLOWING HISTORY  Current Diet Consistency: Regular; Thin liquids  Dysphagia History: As above    Imaging Results:     [address] [place] [address] 7/3/23:  CONCLUSION:   1. No acute intracranial process by noncontrast CT technique. 2. Intracranial atherosclerosis. CXR 7/3/23:  CONCLUSION: Mild bibasilar atelectasis. Otherwise, no radiographic evidence of acute cardiopulmonary abnormality. OBJECTIVE     Dentition: Natural  Symmetry: Within Functional Limits  Strength: Within Functional Limits  Tone: Within Functional Limits  Range of Motion: Within Functional Limits  Rate of Motion: Within Functional Limits    Voice Quality: Clear;Weak  Respiratory Status: Unlabored  Consistencies Trialed: Thin liquids; Hard solid  Method of Presentation: Staff/Clinician assistance;Straw;Spoon  Patient Positioning: Upright;Midline    Oral Phase of Swallow: Within Functional Limits    Pharyngeal Phase of Swallow: Within Functional Limits    (Please note: Silent aspiration cannot be evaluated clinically.  Videofluoroscopic Swallow Study is required to rule-out silent aspiration.)    Esophageal Phase of Swallow: No complaints consistent with possible esophageal involvement      FOLLOW UP  Treatment Plan/Recommendations: No further inpatient SLP service warranted  Number of Visits to Meet Established Goals: 0  Follow Up Needed (Documentation Required): No  SLP Follow-up Date: 07/05/23    Thank you for your referral.   If you have any questions, please contact Cuong Rohca, SLP  Cuong Rocha M.S. Sunita Finley Pathologist  [phone]

## 2023-07-09 LAB
ANION GAP SERPL CALC-SCNC: 7 MMOL/L (ref 0–18)
BUN BLD-MCNC: 15 MG/DL (ref 7–18)
BUN/CREAT SERPL: 21.4 (ref 10–20)
CALCIUM BLD-MCNC: 8.3 MG/DL (ref 8.5–10.1)
CHLORIDE SERPL-SCNC: 112 MMOL/L (ref 98–112)
CO2 SERPL-SCNC: 25 MMOL/L (ref 21–32)
CREAT BLD-MCNC: 0.7 MG/DL
DEPRECATED RDW RBC AUTO: 47.1 FL (ref 35.1–46.3)
ERYTHROCYTE [DISTWIDTH] IN BLOOD BY AUTOMATED COUNT: 14 % (ref 11–15)
GFR SERPLBLD BASED ON 1.73 SQ M-ARVRAT: 89 ML/MIN/1.73M2 (ref 60–?)
GLUCOSE BLD-MCNC: 106 MG/DL (ref 70–99)
HCT VFR BLD AUTO: 38.1 %
HGB BLD-MCNC: 12.1 G/DL
MCH RBC QN AUTO: 28.9 PG (ref 26–34)
MCHC RBC AUTO-ENTMCNC: 31.8 G/DL (ref 31–37)
MCV RBC AUTO: 91.1 FL
OSMOLALITY SERPL CALC.SUM OF ELEC: 299 MOSM/KG (ref 275–295)
PLATELET # BLD AUTO: 282 10(3)UL (ref 150–450)
POTASSIUM SERPL-SCNC: 3.8 MMOL/L (ref 3.5–5.1)
POTASSIUM SERPL-SCNC: 3.8 MMOL/L (ref 3.5–5.1)
RBC # BLD AUTO: 4.18 X10(6)UL
SODIUM SERPL-SCNC: 144 MMOL/L (ref 136–145)
WBC # BLD AUTO: 9.7 X10(3) UL (ref 4–11)

## 2023-07-09 PROCEDURE — 99233 SBSQ HOSP IP/OBS HIGH 50: CPT | Performed by: NURSE PRACTITIONER

## 2023-07-09 NOTE — PLAN OF CARE
Problem: Safety Risk - Non-Violent Restraints  Goal: Patient will remain free from self-harm  Description: INTERVENTIONS:  - Apply the least restrictive restraint to prevent harm  - Notify patient and family of reasons restraints applied  - Assess for any contributing factors to confusion (electrolyte disturbances, delirium, medications)  - Discontinue any unnecessary medical devices as soon as possible  - Assess the patient's physical comfort, circulation, skin condition, hydration, nutrition and elimination needs   - Reorient and redirection as needed  - Assess for the need to continue restraints  Outcome: Progressing     Problem: CARDIOVASCULAR - ADULT  Goal: Maintains optimal cardiac output and hemodynamic stability  Description: INTERVENTIONS:  - Monitor vital signs, rhythm, and trends  - Monitor for bleeding, hypotension and signs of decreased cardiac output  - Evaluate effectiveness of vasoactive medications to optimize hemodynamic stability  - Monitor arterial and/or venous puncture sites for bleeding and/or hematoma  - Assess quality of pulses, skin color and temperature  - Assess for signs of decreased coronary artery perfusion - ex. Angina  - Evaluate fluid balance, assess for edema, trend weights  Outcome: Progressing  Goal: Absence of cardiac arrhythmias or at baseline  Description: INTERVENTIONS:  - Continuous cardiac monitoring, monitor vital signs, obtain 12 lead EKG if indicated  - Evaluate effectiveness of antiarrhythmic and heart rate control medications as ordered  - Initiate emergency measures for life threatening arrhythmias  - Monitor electrolytes and administer replacement therapy as ordered  Outcome: Progressing     Vital signs stable on room air. Patient denies pain at this time. Posey vest and wrist restraints in place for safety and maintain lines and tubes, restraints checked and patient assessed at least every two hours per protocol. Junior patent and draining.    Safety precautions in place, frequent nursing rounding completed, call light within reach. All questions answered and needs met. Report given and care endorsed to [facility].

## 2023-07-09 NOTE — PLAN OF CARE
Assumed care of patient at . Restraints in place, reassessed per protocol. Patient confused and agitated. Son at bedside, updated on plan of care. Junior in place. 1:1 feed for dinner. Frequent repositioning. Fall precautions in place, call light within reach, bed alarm on, all personal belongings in reach. Frequent rounding and assessments by nursing staff. IV fluids and IV antibiotics infusing. Patient became very agitated- kicking and trying to get up. Attempted to redirect and calm down. IV haldol given for agitation. Plan pending medical clearance.

## 2023-07-09 NOTE — PHYSICAL THERAPY NOTE
PHYSICAL THERAPY TREATMENT NOTE - INPATIENT     Room Number: 539/539-A       Presenting Problem: UTI, fever, AMS, tachycardia    Problem List  Principal Problem:    Fever, unspecified fever cause  Active Problems:    Delirium due to another medical condition    Episodic mood disorder (HCC)      PHYSICAL THERAPY ASSESSMENT   Chart reviewed. DIVINA Quan approved participation in physical therapy. PPE worn by therapist: mask and gloves. Patient was not wearing a mask during session. Patient presented in bed and restrained (B wrist, posey vest)  with 0/10 pain. Patient with fair  progress towards goals during this session. Education provided on Physical therapy plan of care and physiological benefits of out of bed mobility. Patient with fair carryover. Pt verbally agreeable to therapy, gait belt donned in sitting. Pt A&O to self. Impulsive during session but able to redirect. Pt able to sit EOB about 10 mins with SBA/CGA to maintain static seated balance. Pt with difficulty following commands, impulsively attempting to stand without walker or gait belt. In standing pt demos unsteady balance and unable to stand fully upright due to weakness, deferred further ambulation this date due to safety concerns. Pt returned to bed with all restraints in place, bed rails up x4. Pt on track to discharge to [facility] once medically cleared. Bed mobility: Mod assist x1 supine to sit with HOB elevated and to scoot EOB  Transfers: Mod assist x1 + CGA x1 for safety for STS transfers with and without RW, cues for hand placement on RW. Unsteady in standing, demos posterior lean  Gait Assistance: Not tested (pt unsteady in standing, unsafe to ambulate)            Patient was left in bed and restraints in place (B wrist and posey vest)  at end of session with all needs in reach. The patient's Approx Degree of Impairment: 57.7% has been calculated based on documentation in the [facility] '6 clicks' Inpatient Basic Mobility Short Form.   Research supports that patients with this level of impairment may benefit from Subacute Rehab. RN aware of patient status post session. DISCHARGE RECOMMENDATIONS  PT Discharge Recommendations: Sub-acute rehabilitation     PLAN  PT Treatment Plan: Body mechanics; Coordination; Endurance; Patient education;Gait training;Neuromuscular re-educate;Strengthening;Transfer training;Balance training    SUBJECTIVE  I need to learn the parts of this system    OBJECTIVE  Precautions: Hard of hearing; Restraints;Bed/chair alarm    WEIGHT BEARING RESTRICTION  Weight Bearing Restriction: None                PAIN ASSESSMENT   Ratin          BALANCE                                                                                                                       Static Sitting: Fair +  Dynamic Sitting: Fair           Static Standing: Poor +  Dynamic Standing: Poor           AM-PAC '6-Clicks' INPATIENT SHORT FORM - BASIC MOBILITY  How much difficulty does the patient currently have. .. Patient Difficulty: Turning over in bed (including adjusting bedclothes, sheets and blankets)?: A Little   Patient Difficulty: Sitting down on and standing up from a chair with arms (e.g., wheelchair, bedside commode, etc.): A Little   Patient Difficulty: Moving from lying on back to sitting on the side of the bed?: A Little   How much help from another person does the patient currently need. .. Help from Another: Moving to and from a bed to a chair (including a wheelchair)?: A Little   Help from Another: Need to walk in hospital room?: A Lot   Help from Another: Climbing 3-5 steps with a railing?: Total     AM-PAC Score:  Raw Score: 15   Approx Degree of Impairment: 57.7%   Standardized Score (AM-PAC Scale): 39.45   CMS Modifier (G-Code): CK            Patient End of Session: In bed;Needs met;Call light within reach;RN aware of session/findings; Restraints    CURRENT GOALS   Goals to be met by: 7/15/23  Patient Goal Patient's self-stated goal is: maximize functional independence and safety   Goal #1 Patient is able to demonstrate supine - sit EOB @ level: supervision      Goal #1   Current Status  Mod A    Goal #2 Patient is able to demonstrate transfers EOB to/from [facility] at assistance level: supervision with walker - rolling      Goal #2  Current Status  Min A with and without RW   Goal #3 Patient is able to ambulate 50 feet with assist device: walker - rolling at assistance level: contact guard assistance   Goal #3   Current Status  unsteady in standing, NT due to safety concern   Goal #4 Patient will negotiate 1 stairs/one curb w/ assistive device and supervision   Goal #4   Current Status  NT   Goal #5 Patient to demonstrate independence with home activity/exercise instructions provided to patient in preparation for discharge.    Goal #5   Current Status  in progress   Goal #6     Goal #6  Current Status           Therapeutic Activity: 15 minutes    Tomas Velázquez  [facility]  [phone]

## 2023-07-10 RX ORDER — QUETIAPINE FUMARATE 25 MG/1
50 TABLET, FILM COATED ORAL NIGHTLY
Status: DISCONTINUED | OUTPATIENT
Start: 2023-07-10 | End: 2023-07-12

## 2023-07-10 RX ORDER — DIVALPROEX SODIUM 125 MG/1
125 CAPSULE, COATED PELLETS ORAL EVERY 8 HOURS SCHEDULED
Status: DISCONTINUED | OUTPATIENT
Start: 2023-07-10 | End: 2023-07-12

## 2023-07-10 NOTE — CM/SW NOTE
Updates sent to [email or web address] [facility] in [place]. Pt remains restrained d/t increased confusion. Psych is following patient's progress. Plan: PP for SALUD pending medical clearance.     ISABEL PhamN    [phone]

## 2023-07-11 LAB
ANION GAP SERPL CALC-SCNC: 7 MMOL/L (ref 0–18)
BASOPHILS # BLD AUTO: 0.05 X10(3) UL (ref 0–0.2)
BASOPHILS NFR BLD AUTO: 0.7 %
BUN BLD-MCNC: 8 MG/DL (ref 7–18)
BUN/CREAT SERPL: 10.5 (ref 10–20)
CALCIUM BLD-MCNC: 8.7 MG/DL (ref 8.5–10.1)
CHLORIDE SERPL-SCNC: 110 MMOL/L (ref 98–112)
CO2 SERPL-SCNC: 28 MMOL/L (ref 21–32)
CREAT BLD-MCNC: 0.76 MG/DL
DEPRECATED RDW RBC AUTO: 45.1 FL (ref 35.1–46.3)
EOSINOPHIL # BLD AUTO: 0.3 X10(3) UL (ref 0–0.7)
EOSINOPHIL NFR BLD AUTO: 4 %
ERYTHROCYTE [DISTWIDTH] IN BLOOD BY AUTOMATED COUNT: 13.8 % (ref 11–15)
GFR SERPLBLD BASED ON 1.73 SQ M-ARVRAT: 86 ML/MIN/1.73M2 (ref 60–?)
GLUCOSE BLD-MCNC: 104 MG/DL (ref 70–99)
HCT VFR BLD AUTO: 41.1 %
HGB BLD-MCNC: 13.3 G/DL
IMM GRANULOCYTES # BLD AUTO: 0.04 X10(3) UL (ref 0–1)
IMM GRANULOCYTES NFR BLD: 0.5 %
LYMPHOCYTES # BLD AUTO: 1.46 X10(3) UL (ref 1–4)
LYMPHOCYTES NFR BLD AUTO: 19.6 %
MAGNESIUM SERPL-MCNC: 2.6 MG/DL (ref 1.6–2.6)
MCH RBC QN AUTO: 29 PG (ref 26–34)
MCHC RBC AUTO-ENTMCNC: 32.4 G/DL (ref 31–37)
MCV RBC AUTO: 89.5 FL
MONOCYTES # BLD AUTO: 0.69 X10(3) UL (ref 0.1–1)
MONOCYTES NFR BLD AUTO: 9.2 %
NEUTROPHILS # BLD AUTO: 4.92 X10 (3) UL (ref 1.5–7.7)
NEUTROPHILS # BLD AUTO: 4.92 X10(3) UL (ref 1.5–7.7)
NEUTROPHILS NFR BLD AUTO: 66 %
OSMOLALITY SERPL CALC.SUM OF ELEC: 299 MOSM/KG (ref 275–295)
PLATELET # BLD AUTO: 312 10(3)UL (ref 150–450)
POTASSIUM SERPL-SCNC: 3.5 MMOL/L (ref 3.5–5.1)
RBC # BLD AUTO: 4.59 X10(6)UL
SODIUM SERPL-SCNC: 145 MMOL/L (ref 136–145)
WBC # BLD AUTO: 7.5 X10(3) UL (ref 4–11)

## 2023-07-11 PROCEDURE — 99233 SBSQ HOSP IP/OBS HIGH 50: CPT | Performed by: OTHER

## 2023-07-11 RX ORDER — LORAZEPAM 2 MG/ML
1 INJECTION INTRAMUSCULAR ONCE
Status: COMPLETED | OUTPATIENT
Start: 2023-07-11 | End: 2023-07-11

## 2023-07-12 LAB
ANION GAP SERPL CALC-SCNC: 4 MMOL/L (ref 0–18)
BASOPHILS # BLD AUTO: 0.05 X10(3) UL (ref 0–0.2)
BASOPHILS NFR BLD AUTO: 0.6 %
BUN BLD-MCNC: 10 MG/DL (ref 7–18)
BUN/CREAT SERPL: 13 (ref 10–20)
CALCIUM BLD-MCNC: 8.4 MG/DL (ref 8.5–10.1)
CHLORIDE SERPL-SCNC: 112 MMOL/L (ref 98–112)
CO2 SERPL-SCNC: 24 MMOL/L (ref 21–32)
CREAT BLD-MCNC: 0.77 MG/DL
DEPRECATED RDW RBC AUTO: 44.8 FL (ref 35.1–46.3)
EOSINOPHIL # BLD AUTO: 0.19 X10(3) UL (ref 0–0.7)
EOSINOPHIL NFR BLD AUTO: 2.3 %
ERYTHROCYTE [DISTWIDTH] IN BLOOD BY AUTOMATED COUNT: 13.8 % (ref 11–15)
GFR SERPLBLD BASED ON 1.73 SQ M-ARVRAT: 86 ML/MIN/1.73M2 (ref 60–?)
GLUCOSE BLD-MCNC: 105 MG/DL (ref 70–99)
HCT VFR BLD AUTO: 42.3 %
HGB BLD-MCNC: 13.6 G/DL
IMM GRANULOCYTES # BLD AUTO: 0.03 X10(3) UL (ref 0–1)
IMM GRANULOCYTES NFR BLD: 0.4 %
LYMPHOCYTES # BLD AUTO: 1.6 X10(3) UL (ref 1–4)
LYMPHOCYTES NFR BLD AUTO: 19.4 %
MAGNESIUM SERPL-MCNC: 2.5 MG/DL (ref 1.6–2.6)
MCH RBC QN AUTO: 28.6 PG (ref 26–34)
MCHC RBC AUTO-ENTMCNC: 32.2 G/DL (ref 31–37)
MCV RBC AUTO: 88.9 FL
MONOCYTES # BLD AUTO: 0.78 X10(3) UL (ref 0.1–1)
MONOCYTES NFR BLD AUTO: 9.5 %
NEUTROPHILS # BLD AUTO: 5.6 X10 (3) UL (ref 1.5–7.7)
NEUTROPHILS # BLD AUTO: 5.6 X10(3) UL (ref 1.5–7.7)
NEUTROPHILS NFR BLD AUTO: 67.8 %
OSMOLALITY SERPL CALC.SUM OF ELEC: 289 MOSM/KG (ref 275–295)
PLATELET # BLD AUTO: 317 10(3)UL (ref 150–450)
POTASSIUM SERPL-SCNC: 4.1 MMOL/L (ref 3.5–5.1)
RBC # BLD AUTO: 4.76 X10(6)UL
SODIUM SERPL-SCNC: 140 MMOL/L (ref 136–145)
WBC # BLD AUTO: 8.3 X10(3) UL (ref 4–11)

## 2023-07-12 PROCEDURE — 99233 SBSQ HOSP IP/OBS HIGH 50: CPT | Performed by: NURSE PRACTITIONER

## 2023-07-12 RX ORDER — HALOPERIDOL 5 MG/ML
2 INJECTION INTRAMUSCULAR ONCE
Status: COMPLETED | OUTPATIENT
Start: 2023-07-12 | End: 2023-07-12

## 2023-07-12 RX ORDER — QUETIAPINE FUMARATE 50 MG/1
50 TABLET, EXTENDED RELEASE ORAL EVERY EVENING
Status: DISCONTINUED | OUTPATIENT
Start: 2023-07-12 | End: 2023-07-15

## 2023-07-12 RX ORDER — DIVALPROEX SODIUM 125 MG/1
250 CAPSULE, COATED PELLETS ORAL EVERY 8 HOURS SCHEDULED
Status: DISCONTINUED | OUTPATIENT
Start: 2023-07-12 | End: 2023-07-19

## 2023-07-12 RX ORDER — QUETIAPINE FUMARATE 25 MG/1
25 TABLET, FILM COATED ORAL NIGHTLY
Status: DISCONTINUED | OUTPATIENT
Start: 2023-07-12 | End: 2023-07-19

## 2023-07-12 NOTE — PLAN OF CARE
Problem: Risk for Violence-Violent Restraints/Seclusion  Goal: Patient will not express any violent or self-destructive behaviors  Description: Interventions:  - Apply the least restrictive restraint to prevent harm if patient strikes out and is not responding to redirection  - Notify patient and family of reasons restraints applied  - Assist the patient to identify the precipitating event  - Assist the patient to identify alternatives to physically acting out  - Assess for any contributing factors to violent behaviors (substances,  medications, history of trauma)  - Assess the patient's physical comfort, circulation, skin condition, hydration, nutrition and elimination needs   - Assess for the need to continue restraints  - Evaluate the need for an emergency medication  Outcome: Not Progressing

## 2023-07-13 LAB
ANION GAP SERPL CALC-SCNC: 6 MMOL/L (ref 0–18)
BUN BLD-MCNC: 11 MG/DL (ref 7–18)
BUN/CREAT SERPL: 13.4 (ref 10–20)
CALCIUM BLD-MCNC: 8.6 MG/DL (ref 8.5–10.1)
CHLORIDE SERPL-SCNC: 112 MMOL/L (ref 98–112)
CO2 SERPL-SCNC: 25 MMOL/L (ref 21–32)
CREAT BLD-MCNC: 0.82 MG/DL
GFR SERPLBLD BASED ON 1.73 SQ M-ARVRAT: 84 ML/MIN/1.73M2 (ref 60–?)
GLUCOSE BLD-MCNC: 118 MG/DL (ref 70–99)
MAGNESIUM SERPL-MCNC: 2.4 MG/DL (ref 1.6–2.6)
OSMOLALITY SERPL CALC.SUM OF ELEC: 296 MOSM/KG (ref 275–295)
POTASSIUM SERPL-SCNC: 3.9 MMOL/L (ref 3.5–5.1)
SODIUM SERPL-SCNC: 143 MMOL/L (ref 136–145)

## 2023-07-13 PROCEDURE — 99233 SBSQ HOSP IP/OBS HIGH 50: CPT | Performed by: OTHER

## 2023-07-13 NOTE — PROGRESS NOTES
07/13/23 1045   VISIT TYPE   OT Inpatient Visit Type (Documentation Required) Attempted Treatment     RN approving of pt participation in therapy. Treatment coordinated w/ PT. Mask and gloves worn. Pt received in bed sleeping soundly and not fully aroused to conversation/tactile stim. Pt declining oob activity at this time indicating that he was too sleepy.  OT treatment deferred

## 2023-07-13 NOTE — PHYSICAL THERAPY NOTE
Pt too combative per RN       07/12/23 3224   VISIT TYPE   PT Inpatient Visit Type (Documentation Required) Attempted Treatment

## 2023-07-13 NOTE — PHYSICAL THERAPY NOTE
07/13/23 1100   VISIT TYPE   PT Inpatient Visit Type (Documentation Required) Attempted Treatment  (Attempted treatment pt was to sleepy to participate in therapy session. pt was arousible and declined at this time.  WIll follow up as appropriate.)

## 2023-07-14 LAB
ANION GAP SERPL CALC-SCNC: 6 MMOL/L (ref 0–18)
BUN BLD-MCNC: 16 MG/DL (ref 7–18)
BUN/CREAT SERPL: 21.1 (ref 10–20)
CALCIUM BLD-MCNC: 8.5 MG/DL (ref 8.5–10.1)
CHLORIDE SERPL-SCNC: 109 MMOL/L (ref 98–112)
CO2 SERPL-SCNC: 26 MMOL/L (ref 21–32)
CREAT BLD-MCNC: 0.76 MG/DL
GFR SERPLBLD BASED ON 1.73 SQ M-ARVRAT: 86 ML/MIN/1.73M2 (ref 60–?)
GLUCOSE BLD-MCNC: 116 MG/DL (ref 70–99)
MAGNESIUM SERPL-MCNC: 2.4 MG/DL (ref 1.6–2.6)
OSMOLALITY SERPL CALC.SUM OF ELEC: 294 MOSM/KG (ref 275–295)
POTASSIUM SERPL-SCNC: 3.7 MMOL/L (ref 3.5–5.1)
SODIUM SERPL-SCNC: 141 MMOL/L (ref 136–145)

## 2023-07-14 PROCEDURE — 99233 SBSQ HOSP IP/OBS HIGH 50: CPT | Performed by: OTHER

## 2023-07-14 NOTE — PHYSICAL THERAPY NOTE
PHYSICAL THERAPY TREATMENT NOTE - INPATIENT     Room Number: 061/891-T       Presenting Problem: UTI, fever, AMS, tachycardia    Problem List  Principal Problem:    Fever, unspecified fever cause  Active Problems:    Delirium due to another medical condition    Episodic mood disorder (HCC)      PHYSICAL THERAPY ASSESSMENT   Chart reviewed. DIVINA Gentile approved participation in physical therapy. PPE worn by therapist: mask and gloves. Patient was not wearing a mask during session. Patient presented in bed and alarm activated with 0/10 pain. Patient with good  progress towards goals during this session. Education provided on Physical therapy plan of care and physiological benefits of out of bed mobility. Patient with fair carryover. Pt seen in coordination with OT Emiliana to maximize pt outcomes and safety. Pt agreeable to therapy, gait belt donned in sitting. Pt denies dizziness at rest, A&O to self but easy to redirect and follows commands. /54 at rest in supine. Demos unsteadiness in standing and inability to ambulate due to fatigue and global weakness from being in bed with restraints during this admission, would benefit from SALUD at discharge to allow pt to return to OF and maximize safety with functional tasks. Bed mobility: Mod assist supine to sit with HOB elevated and to scoot EOB. Pt sat EOB about 8 mins prior to standing to acclimate to new psotion. Pt dizzy upon sitting up, BP 87/56 in sitting, instructed pt to perform ankle pumps to increase BP. Transfers: Mod assist x1 for STS transfer, CGA x1 for tactile cues to prevent feet from sliding anteriorly. Pt able to perform STS x3 trials, standing for maximum 35 sec with Mod A to maintain. Mod A x2 SPT bed to chair, BP 86/59 in sitting after transfer, denies dizziness. Gait Assistance: Not tested            Patient was left in bedside chair and alarm activated at end of session with all needs in reach.      The patient's Approx Degree of Impairment: 68.66% has been calculated based on documentation in the [facility] '6 clicks' Inpatient Basic Mobility Short Form. Research supports that patients with this level of impairment may benefit from Subacute Rehab. RN aware of patient status post session. DISCHARGE RECOMMENDATIONS  PT Discharge Recommendations: Sub-acute rehabilitation     PLAN  PT Treatment Plan: Body mechanics; Coordination; Endurance; Patient education;Gait training;Neuromuscular re-educate;Strengthening;Transfer training;Balance training    SUBJECTIVE  Have you seen Leelee Chang? OBJECTIVE  Precautions: Hard of hearing; Restraints;Bed/chair alarm    WEIGHT BEARING RESTRICTION  Weight Bearing Restriction: None                PAIN ASSESSMENT   Ratin          BALANCE                                                                                                                       Static Sitting: Fair +  Dynamic Sitting: Fair           Static Standing: Poor +  Dynamic Standing: Poor    ACTIVITY TOLERANCE  Pulse: 82  Heart Rate Source: Monitor     BP: 102/54 (87/56 in sitting, 86/59 in sitting after SPT transfer)  BP Location: Left arm  BP Method: Automatic  Patient Position: Lying        AM-PAC '6-Clicks' INPATIENT SHORT FORM - BASIC MOBILITY  How much difficulty does the patient currently have. .. Patient Difficulty: Turning over in bed (including adjusting bedclothes, sheets and blankets)?: A Little   Patient Difficulty: Sitting down on and standing up from a chair with arms (e.g., wheelchair, bedside commode, etc.): A Lot   Patient Difficulty: Moving from lying on back to sitting on the side of the bed?: A Lot   How much help from another person does the patient currently need. ..    Help from Another: Moving to and from a bed to a chair (including a wheelchair)?: A Lot   Help from Another: Need to walk in hospital room?: A Lot   Help from Another: Climbing 3-5 steps with a railing?: Total     AM-PAC Score:  Raw Score: 12   Approx Degree of Impairment: 68.66%   Standardized Score (AM-PAC Scale): 35.33   CMS Modifier (G-Code): CL        Patient End of Session: Up in chair;Needs met;Call light within reach;RN aware of session/findings; Alarm set    CURRENT GOALS   Goals to be met by: 7/15/23  Patient Goal Patient's self-stated goal is: maximize functional independence and safety   Goal #1 Patient is able to demonstrate supine - sit EOB @ level: supervision      Goal #1   Current Status  Mod A    Goal #2 Patient is able to demonstrate transfers EOB to/from [facility] at assistance level: supervision with walker - rolling      Goal #2  Current Status  Mod A x2 with RW   Goal #3 Patient is able to ambulate 50 feet with assist device: walker - rolling at assistance level: contact guard assistance   Goal #3   Current Status  unsteady in standing, unable to fully stand upright    Goal #4 Patient will negotiate 1 stairs/one curb w/ assistive device and supervision   Goal #4   Current Status  NT   Goal #5 Patient to demonstrate independence with home activity/exercise instructions provided to patient in preparation for discharge.    Goal #5   Current Status  in progress   Goal #6     Goal #6  Current Status         Therapeutic Activity: 25 minutes    [facility]  [phone]

## 2023-07-14 NOTE — PROGRESS NOTES
Restraints discontinued. Patient alert to self, following commands, redirectable, compliant with POC.

## 2023-07-14 NOTE — CM/SW NOTE
Restraints dc'd this morning at 0745. PT/OT confirmed SALUD recommendation at dc. CM notified liaison Karlos Augustine at [facility] that pt will likely be ready for dc on Saturday. [facility] placed on will call from 7/15 to 7/17, PCS updated. Plan: PP for SALUD pending medical clearance.     Erica Pereyra, ISABELN    [phone]

## 2023-07-15 LAB
ANION GAP SERPL CALC-SCNC: 6 MMOL/L (ref 0–18)
BUN BLD-MCNC: 21 MG/DL (ref 7–18)
BUN/CREAT SERPL: 22.6 (ref 10–20)
CALCIUM BLD-MCNC: 8.4 MG/DL (ref 8.5–10.1)
CHLORIDE SERPL-SCNC: 109 MMOL/L (ref 98–112)
CO2 SERPL-SCNC: 26 MMOL/L (ref 21–32)
CREAT BLD-MCNC: 0.93 MG/DL
GFR SERPLBLD BASED ON 1.73 SQ M-ARVRAT: 79 ML/MIN/1.73M2 (ref 60–?)
GLUCOSE BLD-MCNC: 108 MG/DL (ref 70–99)
MAGNESIUM SERPL-MCNC: 2.5 MG/DL (ref 1.6–2.6)
OSMOLALITY SERPL CALC.SUM OF ELEC: 296 MOSM/KG (ref 275–295)
POTASSIUM SERPL-SCNC: 3.8 MMOL/L (ref 3.5–5.1)
SODIUM SERPL-SCNC: 141 MMOL/L (ref 136–145)

## 2023-07-15 PROCEDURE — 99233 SBSQ HOSP IP/OBS HIGH 50: CPT | Performed by: OTHER

## 2023-07-15 RX ORDER — TAMSULOSIN HYDROCHLORIDE 0.4 MG/1
0.4 CAPSULE ORAL
Qty: 30 CAPSULE | Refills: 0 | Status: ON HOLD | COMMUNITY
Start: 2023-07-16

## 2023-07-15 RX ORDER — RISPERIDONE 0.25 MG/1
0.25 TABLET ORAL 2 TIMES DAILY
Status: DISCONTINUED | OUTPATIENT
Start: 2023-07-15 | End: 2023-07-18

## 2023-07-15 NOTE — DISCHARGE SUMMARY
General Medicine Discharge Summary     Patient ID:  Юлия Love.  80year old  4/13/1935    Admit date: 7/3/2023    Discharge date and time: 7/15/2023    Attending Physician: Jack Monroy DO     Consults: IP CONSULT TO PULMONOLOGY  IP CONSULT TO PHARMACY  IP CONSULT TO SPIRITUAL CARE  IP CONSULT TO SOCIAL WORK  CONSULT TO WOUND OSTOMY  IP CONSULT TO UROLOGY  IP CONSULT TO SOCIAL WORK  IP CONSULT TO PSYCHIATRY    Primary Care Physician: Gracie Madden MD     Reason for admission: Altered mental status fever urinary tract infection    Risk For Readmission: Moderate    Discharge Diagnoses: Fever, unspecified fever cause [R50.9]  See Additional Discharge Diagnoses in Hospital Course    Discharged Condition: good    Follow-up with labs/images appointments: Follow-up with primary care provider within 1 week of discharge from rehab    Exam  Gen: No acute distress, sitting up in bed alert and oriented times self  Pulm: Lungs clear, normal respiratory effort  CV: Heart with regular rate and rhythm  Abd: Abdomen soft,   EXT: no edema     HPI:     Patient is a 80year old male with PMH hyperlipidemia, heart place where he lives with his elderly wife, per report was found in a state of altered mental status sitting in a chair and confused with low blood pressure and elevated heart rate per EMS with a fever. Upon my evaluation patient is able to tell me his name he knows that the hospital does not know the year or why he is at the hospital I have any gotten significant amount of fluids in his started on antibiotics. Pulmonary critical care service was consulted as patient required sepsis bolus and Levophed due to hypotension in the emergency department. Patient has multiple abrasions on both legs but is unable to tell me when they happen or if he has frequent falls.       Hospital Course:   Patient was admitted for altered mental status and fever found to have, septic shock secondary to urinary tract infection urine culture grew Enterobacter treated for 10-day course with cefepime patient typically did intermittent catheterization Junior catheter was placed during the hospital stay patient was seen by urology it was determined given fluctuation in mental status he would not be able to return to clean intermittent catheterization he will need close follow-up with urology to discuss bladder management procedures he was also started on Flomax during the hospital stay. Hospital course was complicated by a brief episode of paroxysmal A-fib secondary to agitation metoprolol was started anticoagulation was held off as this was a less than 15-minute episode, would consider outpatient follow-up with primary care doctor and complete a Holter monitor once acute medical issues have resolved  Hospital course was also complicated by alteration in mental status and delirium seen by psychiatry service multiple medication adjustments were made ultimately patient remained off restraints and was discharged to [facility]  Patient will need close follow-up with primary care provider    Operative Procedures:      Imaging: No results found. Disposition: home    Activity: activity as tolerated  Diet: regular diet  Wound Care: none needed  Code Status: Full Code  O2: None    Home Medication Changes:   See list below    Med list     Medication List        START taking these medications      metoprolol tartrate 25 MG Tabs  Commonly known as: Lopressor     tamsulosin 0.4 MG Caps  Commonly known as: Flomax  Start taking on: July 16, 2023            Adal Zarco taking these medications      multivitamin Chew     mupirocin 2 % Oint  Commonly known as: Bactroban  Apply to AA on L lower leg QD.     triamcinolone 0.1 % Crea  Commonly known as: Kenalog  Apply to AA on b/l legs and abdomen once to twice daily PRN, use 2 weeks on, 1 week off PRN.             STOP taking these medications      simvastatin 20 MG Tabs  Commonly known as: Zocor              FU Follow-up Information       Frank Gaytan MD Follow up in 2 week(s). Specialty: UROLOGY  Contact information:  [address]  [phone]               Josseline Ibanez MD Follow up in 1 week(s). Specialty: Internal Medicine  Why: after discharge from sub acute rehab  Contact information:  [address] [facility] instructions: Other Discharge Instructions: You were treated for a urinary track infection, you had a pinzon catheter placed and this will need to be changed monthly, you will need close follow-up with Urologist to discuss chronic bladder management. You had confusion in hospital related to your infection and this has slowly improved. I reconciled current and discharge medications on day of discharge, discussed changes with patient and noted changes above.        Total Time Coordinating Care: 35 minutes    Patient had opportunity to ask questions and state understand and agree with therapeutic plan as outlined    Thank You,    Rosaria Cranker,    Hospitalist with [facility]

## 2023-07-15 NOTE — DISCHARGE INSTRUCTIONS
You were treated for a urinary track infection, you had a pinzon catheter placed and this will need to be changed monthly, you will need close follow-up with Urologist to discuss chronic bladder management. You had confusion in hospital related to your infection and this has slowly improved. Please follow up with [facility] for continued psychiatric resources     Repeat BMP and CBC in 1 week                 Below are behavioral health resources to follow up with for additional support. Medication Management:   Dr. Adrinaa Rojo with [address] located at [place] [address] in [facility]. Contact Number: [phone]  Eyal Cordero with [address] located at [facility] in [address] [place] [address]. Contact Number: [phone]  [address] [place] [address] Psychiatry located at [facility] in [address] [place] [address]. Contact Number: [phone]  [address] [place] [address] located at [place] [address] in [place]. Contact Number: [phone]    Outpatient Therapy:  In-Home Counseling. Contact Number: [phone], option 1 or by email: [email or web address]  [facility] located at [address] [facility] in [place]. Contact Number: [phone]    [facility]located at [address] [place] [address] [address] in [place]. Contact Number:  [phone]    [place] [address] [address] in [address] [place] [address].  Contact Number: [phone]

## 2023-07-16 PROCEDURE — 99233 SBSQ HOSP IP/OBS HIGH 50: CPT | Performed by: OTHER

## 2023-07-16 NOTE — PLAN OF CARE
Problem: Patient Centered Care  Goal: Patient preferences are identified and integrated in the patient's plan of care  Description: Interventions:  - What would you like us to know as we care for you?   - Provide timely, complete, and accurate information to patient/family  - Incorporate patient and family knowledge, values, beliefs, and cultural backgrounds into the planning and delivery of care  - Encourage patient/family to participate in care and decision-making at the level they choose  - Honor patient and family perspectives and choices  Outcome: Progressing     Problem: GASTROINTESTINAL - ADULT  Goal: Maintains or returns to baseline bowel function  Description: INTERVENTIONS:  - Assess bowel function  - Maintain adequate hydration with IV or PO as ordered and tolerated  - Evaluate effectiveness of GI medications  - Encourage mobilization and activity  - Obtain nutritional consult as needed  - Establish a toileting routine/schedule  - Consider collaborating with pharmacy to review patient's medication profile  Outcome: Progressing  Goal: Maintains adequate nutritional intake (undernourished)  Description: INTERVENTIONS:  - Monitor percentage of each meal consumed  - Identify factors contributing to decreased intake, treat as appropriate  - Assist with meals as needed  - Monitor I&O, WT and lab values  - Obtain nutritional consult as needed  - Optimize oral hygiene and moisture  - Encourage food from home; allow for food preferences  - Enhance eating environment  Outcome: Progressing     Problem: SKIN/TISSUE INTEGRITY - ADULT  Goal: Skin integrity remains intact  Description: INTERVENTIONS  - Assess and document risk factors for pressure ulcer development  - Assess and document skin integrity  - Monitor for areas of redness and/or skin breakdown  - Initiate interventions, skin care algorithm/standards of care as needed  Outcome: Progressing  Goal: Incision(s), wounds(s) or drain site(s) healing without S/S of infection  Description: INTERVENTIONS:  - Assess and document risk factors for pressure ulcer development  - Assess and document skin integrity  - Assess and document dressing/incision, wound bed, drain sites and surrounding tissue  - Implement wound care per orders  - Initiate isolation precautions as appropriate  - Initiate Pressure Ulcer prevention bundle as indicated  Outcome: Progressing  Goal: Oral mucous membranes remain intact  Description: INTERVENTIONS  - Assess oral mucosa and hygiene practices  - Implement preventative oral hygiene regimen  - Implement oral medicated treatments as ordered  Outcome: Progressing     Problem: SAFETY ADULT - FALL  Goal: Free from fall injury  Description: INTERVENTIONS:  - Assess pt frequently for physical needs  - Identify cognitive and physical deficits and behaviors that affect risk of falls.   - [place] fall precautions as indicated by assessment.  - Educate pt/family on patient safety including physical limitations  - Instruct pt to call for assistance with activity based on assessment  - Modify environment to reduce risk of injury  - Provide assistive devices as appropriate  - Consider OT/PT consult to assist with strengthening/mobility  - Encourage toileting schedule  Outcome: Progressing     Problem: Delirium  Goal: Minimize duration of delirium  Description: Interventions:  - Encourage use of hearing aids, eye glasses  - Promote highest level of mobility daily  - Provide frequent reorientation  - Promote wakefulness i.e. lights on, blinds open  - Promote sleep, encourage patient's normal rest cycle i.e. lights off, TV off, minimize noise and interruptions  - Encourage family to assist in orientation and promotion of home routines  Outcome: Progressing     Problem: Safety Risk - Non-Violent Restraints  Goal: Patient will remain free from self-harm  Description: INTERVENTIONS:  - Apply the least restrictive restraint to prevent harm  - Notify patient and family of reasons restraints applied  - Assess for any contributing factors to confusion (electrolyte disturbances, delirium, medications)  - Discontinue any unnecessary medical devices as soon as possible  - Assess the patient's physical comfort, circulation, skin condition, hydration, nutrition and elimination needs   - Reorient and redirection as needed  - Assess for the need to continue restraints  Outcome: Progressing     No acute changes overnight, vital signs being monitored, non-violent restraints continued and assessed every two hours, pinzon in place and draining, medications given whole with apple sauce, frequent rounding by nursing staff, appropriate safety precautions in place, call light within reach, POA updated on plan of care and notified of restraint use.

## 2023-07-16 NOTE — PLAN OF CARE
Restraints started due to patient having agitation and restlessness after attempting PRN medications.  Orders put in per MD.    Problem: Safety Risk - Non-Violent Restraints  Goal: Patient will remain free from self-harm  Description: INTERVENTIONS:  - Apply the least restrictive restraint to prevent harm  - Notify patient and family of reasons restraints applied  - Assess for any contributing factors to confusion (electrolyte disturbances, delirium, medications)  - Discontinue any unnecessary medical devices as soon as possible  - Assess the patient's physical comfort, circulation, skin condition, hydration, nutrition and elimination needs   - Reorient and redirection as needed  - Assess for the need to continue restraints  Outcome: Progressing

## 2023-07-17 PROCEDURE — 99233 SBSQ HOSP IP/OBS HIGH 50: CPT | Performed by: OTHER

## 2023-07-17 NOTE — PROGRESS NOTES
PT note PM: PT spoke with RN who reported pt was confused and swinging at staff earlier and pt was given haldol to calm down. Pt not appropriate to participate with PT treatment at this time, resting in bed with lap posey restraints. PT will continue to follow and progress as medical progress allows.

## 2023-07-18 LAB
ANION GAP SERPL CALC-SCNC: 6 MMOL/L (ref 0–18)
BASOPHILS # BLD AUTO: 0.05 X10(3) UL (ref 0–0.2)
BASOPHILS NFR BLD AUTO: 0.5 %
BUN BLD-MCNC: 14 MG/DL (ref 7–18)
BUN/CREAT SERPL: 15.9 (ref 10–20)
CALCIUM BLD-MCNC: 8.8 MG/DL (ref 8.5–10.1)
CHLORIDE SERPL-SCNC: 109 MMOL/L (ref 98–112)
CO2 SERPL-SCNC: 28 MMOL/L (ref 21–32)
CREAT BLD-MCNC: 0.88 MG/DL
DEPRECATED RDW RBC AUTO: 45.6 FL (ref 35.1–46.3)
EOSINOPHIL # BLD AUTO: 0.12 X10(3) UL (ref 0–0.7)
EOSINOPHIL NFR BLD AUTO: 1.2 %
ERYTHROCYTE [DISTWIDTH] IN BLOOD BY AUTOMATED COUNT: 13.9 % (ref 11–15)
GFR SERPLBLD BASED ON 1.73 SQ M-ARVRAT: 83 ML/MIN/1.73M2 (ref 60–?)
GLUCOSE BLD-MCNC: 113 MG/DL (ref 70–99)
HCT VFR BLD AUTO: 35.8 %
HGB BLD-MCNC: 11.7 G/DL
IMM GRANULOCYTES # BLD AUTO: 0.04 X10(3) UL (ref 0–1)
IMM GRANULOCYTES NFR BLD: 0.4 %
LYMPHOCYTES # BLD AUTO: 1.84 X10(3) UL (ref 1–4)
LYMPHOCYTES NFR BLD AUTO: 18.2 %
MCH RBC QN AUTO: 29.1 PG (ref 26–34)
MCHC RBC AUTO-ENTMCNC: 32.7 G/DL (ref 31–37)
MCV RBC AUTO: 89.1 FL
MONOCYTES # BLD AUTO: 1.19 X10(3) UL (ref 0.1–1)
MONOCYTES NFR BLD AUTO: 11.8 %
NEUTROPHILS # BLD AUTO: 6.88 X10 (3) UL (ref 1.5–7.7)
NEUTROPHILS # BLD AUTO: 6.88 X10(3) UL (ref 1.5–7.7)
NEUTROPHILS NFR BLD AUTO: 67.9 %
OSMOLALITY SERPL CALC.SUM OF ELEC: 297 MOSM/KG (ref 275–295)
PLATELET # BLD AUTO: 329 10(3)UL (ref 150–450)
POTASSIUM SERPL-SCNC: 4 MMOL/L (ref 3.5–5.1)
RBC # BLD AUTO: 4.02 X10(6)UL
SODIUM SERPL-SCNC: 143 MMOL/L (ref 136–145)
WBC # BLD AUTO: 10.1 X10(3) UL (ref 4–11)

## 2023-07-18 PROCEDURE — 99233 SBSQ HOSP IP/OBS HIGH 50: CPT | Performed by: OTHER

## 2023-07-18 RX ORDER — RISPERIDONE 0.5 MG/1
0.5 TABLET ORAL 2 TIMES DAILY
Status: DISCONTINUED | OUTPATIENT
Start: 2023-07-18 | End: 2023-07-19

## 2023-07-18 NOTE — CM/SW NOTE
Care Progression Note:  Length of stay: 15  GMLOS: 16  Avoidable Delays: SNF Delay: Psychosocial/Behavioral   Code Status: FULL    Acute Medical Issue/Factors:   Fever, unspecified fever cause   AMS/Delirium    Currently in restraints for safety, will need ot be restraint free for 24 hrs prior to dc to [facility] for [facility]. Discharge Barriers: Physical/emotional and/or cognitive functioning   Expected discharge date: 07/20   Expected next site of care: Sub-acute Rehab. Plan: [facility] for SALUD pending medical clearance and free of restraints for 24 hrs. / available for discharge planning.      ISABEL FengN    [phone]

## 2023-07-19 VITALS
RESPIRATION RATE: 18 BRPM | TEMPERATURE: 98 F | SYSTOLIC BLOOD PRESSURE: 109 MMHG | BODY MASS INDEX: 18.72 KG/M2 | HEART RATE: 94 BPM | WEIGHT: 126.38 LBS | OXYGEN SATURATION: 94 % | DIASTOLIC BLOOD PRESSURE: 65 MMHG | HEIGHT: 69 IN

## 2023-07-19 PROBLEM — R41.82 AMS (ALTERED MENTAL STATUS): Status: ACTIVE | Noted: 2023-07-19

## 2023-07-19 PROBLEM — R46.89 COMBATIVE BEHAVIOR: Status: ACTIVE | Noted: 2023-07-19

## 2023-07-19 PROBLEM — R41.82 ALTERED MENTAL STATUS, UNSPECIFIED ALTERED MENTAL STATUS TYPE: Status: ACTIVE | Noted: 2023-07-19

## 2023-07-19 PROCEDURE — 99233 SBSQ HOSP IP/OBS HIGH 50: CPT | Performed by: OTHER

## 2023-07-19 RX ORDER — QUETIAPINE FUMARATE 25 MG/1
25 TABLET, FILM COATED ORAL NIGHTLY
Qty: 30 TABLET | Refills: 0 | Status: ON HOLD | OUTPATIENT
Start: 2023-07-19

## 2023-07-19 RX ORDER — ACETAMINOPHEN 500 MG
500 TABLET ORAL EVERY 6 HOURS PRN
Qty: 30 TABLET | Refills: 0 | Status: ON HOLD | OUTPATIENT
Start: 2023-07-19

## 2023-07-19 RX ORDER — RISPERIDONE 0.5 MG/1
0.5 TABLET ORAL 2 TIMES DAILY
Qty: 60 TABLET | Refills: 0 | Status: ON HOLD | OUTPATIENT
Start: 2023-07-19

## 2023-07-19 RX ORDER — DIVALPROEX SODIUM 125 MG/1
250 CAPSULE, COATED PELLETS ORAL EVERY 8 HOURS SCHEDULED
Qty: 30 CAPSULE | Refills: 0 | Status: ON HOLD | OUTPATIENT
Start: 2023-07-19

## 2023-07-19 RX ORDER — RISPERIDONE 0.5 MG/1
0.5 TABLET ORAL 2 TIMES DAILY
Status: DISCONTINUED | OUTPATIENT
Start: 2023-07-19 | End: 2023-07-19

## 2023-07-19 NOTE — CM/SW NOTE
07/19/23 1023   Discharge disposition   Expected discharge disposition subacute   Post Acute Care Provider [facility] SN[facility]   Discharge transportation [facility]     Pt discussed during nursing rounds. Pt is stable for dc today. MD dc order entered. Pt will dc to [address], [facility] confirmed bed is available today. Pt's POA agreeable to transfer today. [facility] scheduled for 3pm . Number for nurse report is [phone]. Plan: [facility] for SALUD today. / to remain available for support and/or discharge planning.      AUGUSTO Mohan    [phone]

## 2023-07-19 NOTE — PROGRESS NOTES
Patient okay to be discharged per MD order, report to be called by primary RN, peripheral IV to be removed by primary RN, all paperwork filled out and printed to be picked up by [facility] at time of transport. Pt to be picked up by [facility] this afternoon with all belongings, pt stable at this time. Addendum 1524: VSS, peripheral IV removed, pt picked up with all belongings by [facility], stable at time of discharge.

## 2023-07-22 ENCOUNTER — APPOINTMENT (OUTPATIENT)
Dept: CT IMAGING | Facility: HOSPITAL | Age: 88
End: 2023-07-22
Attending: HOSPITALIST
Payer: MEDICARE

## 2023-07-22 ENCOUNTER — APPOINTMENT (OUTPATIENT)
Dept: CT IMAGING | Facility: HOSPITAL | Age: 88
DRG: 057 | End: 2023-07-22
Attending: HOSPITALIST
Payer: MEDICARE

## 2023-07-22 PROCEDURE — 70450 CT HEAD/BRAIN W/O DYE: CPT | Performed by: HOSPITALIST

## 2023-07-25 PROBLEM — F01.518 VASCULAR DEMENTIA WITH BEHAVIORAL DISTURBANCE (HCC): Status: ACTIVE | Noted: 2023-07-25

## 2023-08-02 PROBLEM — R41.89 BEHAVIOR RELATED TO COGNITIVE IMPAIRMENT: Status: ACTIVE | Noted: 2023-08-02

## 2023-08-07 ENCOUNTER — INITIAL APN SNF VISIT (OUTPATIENT)
Dept: INTERNAL MEDICINE CLINIC | Facility: SKILLED NURSING FACILITY | Age: 88
End: 2023-08-07

## 2023-08-07 DIAGNOSIS — R53.1 WEAKNESS: ICD-10-CM

## 2023-08-07 DIAGNOSIS — I48.0 PAROXYSMAL A-FIB (HCC): ICD-10-CM

## 2023-08-07 DIAGNOSIS — R31.9 URINARY TRACT INFECTION WITH HEMATURIA, SITE UNSPECIFIED: ICD-10-CM

## 2023-08-07 DIAGNOSIS — N39.0 URINARY TRACT INFECTION WITH HEMATURIA, SITE UNSPECIFIED: ICD-10-CM

## 2023-08-07 DIAGNOSIS — N31.9 HYPOTONIC NEUROGENIC BLADDER: ICD-10-CM

## 2023-08-07 DIAGNOSIS — F01.518 VASCULAR DEMENTIA WITH BEHAVIORAL DISTURBANCE (HCC): Primary | ICD-10-CM

## 2023-08-07 DIAGNOSIS — Z78.9 DECREASED ACTIVITIES OF DAILY LIVING (ADL): ICD-10-CM

## 2023-08-07 DIAGNOSIS — Z87.898 HISTORY OF URINARY RETENTION: ICD-10-CM

## 2023-08-07 DIAGNOSIS — N40.1 BPH WITH OBSTRUCTION/LOWER URINARY TRACT SYMPTOMS: ICD-10-CM

## 2023-08-07 DIAGNOSIS — N13.8 BPH WITH OBSTRUCTION/LOWER URINARY TRACT SYMPTOMS: ICD-10-CM

## 2023-08-08 VITALS
TEMPERATURE: 97 F | DIASTOLIC BLOOD PRESSURE: 82 MMHG | HEART RATE: 93 BPM | RESPIRATION RATE: 18 BRPM | SYSTOLIC BLOOD PRESSURE: 135 MMHG | OXYGEN SATURATION: 94 %

## 2023-08-09 ENCOUNTER — SNF VISIT (OUTPATIENT)
Dept: INTERNAL MEDICINE CLINIC | Facility: SKILLED NURSING FACILITY | Age: 88
End: 2023-08-09

## 2023-08-09 VITALS
DIASTOLIC BLOOD PRESSURE: 53 MMHG | SYSTOLIC BLOOD PRESSURE: 103 MMHG | RESPIRATION RATE: 18 BRPM | HEART RATE: 71 BPM | TEMPERATURE: 97 F | OXYGEN SATURATION: 96 %

## 2023-08-09 DIAGNOSIS — N40.1 BPH WITH OBSTRUCTION/LOWER URINARY TRACT SYMPTOMS: ICD-10-CM

## 2023-08-09 DIAGNOSIS — F01.518 VASCULAR DEMENTIA WITH BEHAVIORAL DISTURBANCE (HCC): Primary | ICD-10-CM

## 2023-08-09 DIAGNOSIS — N13.8 BPH WITH OBSTRUCTION/LOWER URINARY TRACT SYMPTOMS: ICD-10-CM

## 2023-08-09 DIAGNOSIS — R31.9 URINARY TRACT INFECTION WITH HEMATURIA, SITE UNSPECIFIED: ICD-10-CM

## 2023-08-09 DIAGNOSIS — R53.1 WEAKNESS: ICD-10-CM

## 2023-08-09 DIAGNOSIS — Z87.898 HISTORY OF URINARY RETENTION: ICD-10-CM

## 2023-08-09 DIAGNOSIS — R63.0 POOR APPETITE: ICD-10-CM

## 2023-08-09 DIAGNOSIS — N31.9 HYPOTONIC NEUROGENIC BLADDER: ICD-10-CM

## 2023-08-09 DIAGNOSIS — Z78.9 DECREASED ACTIVITIES OF DAILY LIVING (ADL): ICD-10-CM

## 2023-08-09 DIAGNOSIS — N39.0 URINARY TRACT INFECTION WITH HEMATURIA, SITE UNSPECIFIED: ICD-10-CM

## 2023-08-09 DIAGNOSIS — I48.0 PAROXYSMAL A-FIB (HCC): ICD-10-CM

## 2023-08-11 ENCOUNTER — SNF VISIT (OUTPATIENT)
Dept: INTERNAL MEDICINE CLINIC | Facility: SKILLED NURSING FACILITY | Age: 88
End: 2023-08-11

## 2023-08-11 VITALS
HEART RATE: 76 BPM | OXYGEN SATURATION: 95 % | SYSTOLIC BLOOD PRESSURE: 100 MMHG | TEMPERATURE: 97 F | DIASTOLIC BLOOD PRESSURE: 66 MMHG | RESPIRATION RATE: 18 BRPM

## 2023-08-11 DIAGNOSIS — F01.518 VASCULAR DEMENTIA WITH BEHAVIORAL DISTURBANCE (HCC): Primary | ICD-10-CM

## 2023-08-11 DIAGNOSIS — E44.0 MODERATE PROTEIN-CALORIE MALNUTRITION (HCC): ICD-10-CM

## 2023-08-11 DIAGNOSIS — N13.8 BPH WITH OBSTRUCTION/LOWER URINARY TRACT SYMPTOMS: ICD-10-CM

## 2023-08-11 DIAGNOSIS — R63.0 POOR APPETITE: ICD-10-CM

## 2023-08-11 DIAGNOSIS — R53.1 WEAKNESS: ICD-10-CM

## 2023-08-11 DIAGNOSIS — I48.0 PAROXYSMAL A-FIB (HCC): ICD-10-CM

## 2023-08-11 DIAGNOSIS — N40.1 BPH WITH OBSTRUCTION/LOWER URINARY TRACT SYMPTOMS: ICD-10-CM

## 2023-08-11 DIAGNOSIS — Z87.898 HISTORY OF URINARY RETENTION: ICD-10-CM

## 2023-08-11 DIAGNOSIS — N31.9 HYPOTONIC NEUROGENIC BLADDER: ICD-10-CM

## 2023-08-11 DIAGNOSIS — Z78.9 DECREASED ACTIVITIES OF DAILY LIVING (ADL): ICD-10-CM

## 2023-08-15 ENCOUNTER — SNF DISCHARGE (OUTPATIENT)
Dept: INTERNAL MEDICINE CLINIC | Facility: SKILLED NURSING FACILITY | Age: 88
End: 2023-08-15

## 2023-08-15 VITALS
RESPIRATION RATE: 16 BRPM | OXYGEN SATURATION: 95 % | SYSTOLIC BLOOD PRESSURE: 105 MMHG | TEMPERATURE: 98 F | HEART RATE: 77 BPM | DIASTOLIC BLOOD PRESSURE: 50 MMHG

## 2023-08-15 DIAGNOSIS — Z78.9 DECREASED ACTIVITIES OF DAILY LIVING (ADL): ICD-10-CM

## 2023-08-15 DIAGNOSIS — I48.0 PAROXYSMAL A-FIB (HCC): ICD-10-CM

## 2023-08-15 DIAGNOSIS — N31.9 HYPOTONIC NEUROGENIC BLADDER: ICD-10-CM

## 2023-08-15 DIAGNOSIS — E44.0 MODERATE PROTEIN-CALORIE MALNUTRITION (HCC): ICD-10-CM

## 2023-08-15 DIAGNOSIS — N13.8 BPH WITH OBSTRUCTION/LOWER URINARY TRACT SYMPTOMS: ICD-10-CM

## 2023-08-15 DIAGNOSIS — F01.518 VASCULAR DEMENTIA WITH BEHAVIORAL DISTURBANCE (HCC): Primary | ICD-10-CM

## 2023-08-15 DIAGNOSIS — N40.1 BPH WITH OBSTRUCTION/LOWER URINARY TRACT SYMPTOMS: ICD-10-CM

## 2023-08-15 DIAGNOSIS — R53.1 WEAKNESS: ICD-10-CM

## 2023-08-15 DIAGNOSIS — Z87.898 HISTORY OF URINARY RETENTION: ICD-10-CM

## 2023-08-15 PROCEDURE — 1111F DSCHRG MED/CURRENT MED MERGE: CPT

## 2023-08-15 PROCEDURE — 99316 NF DSCHRG MGMT 30 MIN+: CPT

## 2023-08-15 RX ORDER — DOCUSATE SODIUM 100 MG/1
100 CAPSULE, LIQUID FILLED ORAL 2 TIMES DAILY
COMMUNITY

## 2023-08-15 RX ORDER — QUETIAPINE FUMARATE 25 MG/1
25 TABLET, FILM COATED ORAL 3 TIMES DAILY
COMMUNITY

## 2023-08-15 RX ORDER — POLYETHYLENE GLYCOL 3350 17 G/17G
17 POWDER, FOR SOLUTION ORAL DAILY PRN
COMMUNITY

## 2023-08-16 NOTE — PROGRESS NOTES
Mallory Cuenca. : 1935  Age 80year old  [facility] [address]      Admission to [place] [address] date: 23  Admitting Diagnoses:  Weakness, decreased independence with ADLs, dementia w/ behavioral disturbance, UTI, pAfib, weakness  Discharge date from [place] [address]: 23  Discharge Plan: Transition to [facility]    Follow-up with PCP is recommended within 7-10 days following discharge from rehab. Follow-up with specialists as recommended/needed. SUBJECTIVE:     Today's Visit:  Patient seen today resting in bed w/ caregiver at bedside. Alert, A&Ox1. Denies pain. No complaints at time of visit. Patient denies fevers/chills, headaches, dizziness, chest pain, palpitations, dyspnea, cough, n/v/d/c, urinary sx, new/worsening edema or numbness/tingling. Per caregiver, he slept well at night. Also reports patient is eating much better today. Had BM yesterday per nursing. Continues to require straight cath for retention. No other acute concerns from patient/staff. Seen by psych today. Medications adjusted (see plan below). Call placed to son Abdiel Bliss to provide update and review present management in rehab. He is in agreement w/ all current management. Aware of transition to LTC tomorrow. States they are going to try to wean caregiver hours and see how he does. All other questions/concerns answered.         OBJECTIVE:     Vital signs: reviewed in [facility] EMR   Current medications: reviewed in [facility] EMR  Labs: reviewed in [facility] EMR as available    ASSESSMENT/ PHYSICAL EXAM:     GENERAL HEALTH: petite, elderly patient ; no acute distress  LINES, TUBES, DRAINS: none  SKIN: warm, dry  WOUND: none noted - see routine RN/wound team skin assessment  EYES: conjunctiva normal, no drainage from eyes  HENT: normocephalic; normal nose, no nasal drainage, mucous membranes pink, moist  RESPIRATORY: clear to auscultation; no wheezes or rhonchi, respirations regular, unlabored  CARDIOVASCULAR: S1, S2 normal, irregular rhythm; rate controlled 70-80's  ABDOMEN: active BS+, soft, non-distended; no visible masses; non-tender, no guarding  : no distention  MUSCULOSKELETAL: weakness r/t recent hospitalization/diagnoses/sequelae; will undergo therapies to rehab and improve strength, endurance and independence with ADLs as able/tolerated  EXTREMITIES/VASCULAR: no cyanosis or edema noted   NEUROLOGIC: intact; no sensorimotor deficit, follows commands; no facial asymmetry, unilateral weakness, or pronator drift  PSYCHIATRIC: alert and oriented x 1; affect appropriate; calm and cooperative      PLAN OF CARE UPON SALUD DISCHARGE:     *Updates: Medication adjustments made per Psych MD today - stop thorazine and lexapro, start seroquel TID. Continue all other present management. Dr. James Mendieta and rehab cardiology in agreement w/ no beta blocker or AC treatment at this time for pAfib. Cardiology MD to see patient in rehab this week. Son aware. Repeat CBC, BMP 8/18.       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  Aggressive behavior; behavioral disturbance  - Seroquel 25mg TID (hold for sedation)  - Zyprexa 5mg Q8H prn  - Psych following in rehab - eval today 8/15  - Caregiver at bedside - hours being weaned as able  - Frequent redirection as indicated, promote sleep/wake cycles     UTI  Leukocytosis - resolved  - S/p ceftriaxone in hospital  - Bactrim 800-160 BID - completed 8/7  - Repeat UA/C 8/9 - candida < 10,000 colonies; defer tx at this time     BPH  Hypotonic neurogenic bladder  Urinary retention  - Flomax 0.4mg daily  - Bladder scan Q shift, straight cath > 400mL  - Did not tolerate pinzon - was pulling, had increased agitation  - Follow-up w/ Urology     Paroxysmal Afib  - Beta blocker discontinued at discharge d/t bradycardia  - Not on anticoagulation due to high risk for fall/injury  - Staff to notify for HR >100  - Consult rehab cardiology for med management recommendations     Poor appetite  Moderate protein calorie malnutrition  - ~19lb weight loss in last 3-4 months, per dietician note  - Dietician on consult in rehab; supplements as recommended  - Monitor weight trends  - Encourage PO intake     Physical Deconditioning/Weakness; at risk for falling  - Fall Precautions  - PT/OT/ST to evaluate and treat  - SALUD team to establish care plan meeting with patient and POA/family as appropriate  - SALUD team/ & discharge planner to assist with establishing safe discharge plan for next level of care     DVT Prophylaxis   - Encourage exercise and participation with therapy as much as able     GI Prophylaxis     Pain Management  - Offer to pre-medicate 30-60 min prior to therapy  - Physiatry evaluation with management appreciated  - Tylenol 500mg Q6H prn     Bowel Management Regimen/Constipation   - Colace 100mg BID  - Miralax 17g daily prn - give if no BM x2 days     Vitamins/supplements as r/t deficiencies  SALUD RD to follow while in rehab; supplementation/diet as per SALUD RD  May continue home supplements:     *This patient underwent PT/OT/SLP evaluation with treatment as needed. *Skilled nursing care included assistance with ADLs and medication administration. Labs:  - CBC, BMP 8/18     Follow-Up:  - PCP within 1-2 weeks following SALUD discharge. - Specialists as recommended:  ---> Cardiology as directed  ---> Urology as directed  ---> Psychiatry as directed    Medication Reconciliation Completed: Yes   Prescriptions provided; refills with managing providers post-SNF. Home health services to evaluate and treat    Note to patient: The [facility] makes medical notes like these available to patients in the interest of transparency. However, this is a medical document intended as peer to peer communication. It is written in medical language and may contain abbreviations or verbiage that are unfamiliar. It may appear blunt or direct.  Medical documents are intended to carry relevant information, facts as evident, and the clinical opinion of the practitioner who signs the document. Greater than 30 minutes spent in coordination of care in preparation for discharge.     YINKA Bar  [facility]  Post-Acute Transitions Team  8/15/2023 1:00 PM

## 2023-11-13 ENCOUNTER — EXTERNAL FACILITY (OUTPATIENT)
Dept: PULMONOLOGY | Facility: CLINIC | Age: 88
End: 2023-11-13

## 2023-11-13 DIAGNOSIS — U07.1 COVID-19: Primary | ICD-10-CM

## 2023-11-13 NOTE — PROGRESS NOTES
Pulmonary Consult Note  [facility]    History of Present Illness:   Nina Montes De Oca. is a(n) 80year old male with PMH notable for neurogenic bladder, afib, and vascular dementia, who I am now evaluating for Covid-19 at [facility]. Pt tested positive for Covid-19 on routine facility screening . Up to date with Covid-19 vaccinations. Pt has no complaints. Denies shortness of breath, cough, fever, chills, nausea, vomiting, and diarrhea. He appears to be breathing comfortably. Pt is poor historian. Past Medical History: Neurogenic bladder, vascular dementia, afib, hyperlipidemia    Past Surgical History: Appendectomy, hernia surgery, right total knee replacement, repair of ankle fracture, cystoscopy, tonsillectomy    Social History:  Social History     Socioeconomic History    Marital status:    Tobacco Use    Smoking status: Former     Packs/day: 0.50     Years: 2.00     Additional pack years: 0.00     Total pack years: 1.00     Types: Cigarettes     Quit date: 1956     Years since quittin.4    Smokeless tobacco: Never   Substance and Sexual Activity    Alcohol use: Yes     Alcohol/week: 0.0 standard drinks of alcohol     Comment: occasional    Drug use: No   Other Topics Concern    Caffeine Concern Yes     Allergies: None    Medications: Reviewed on [facility] EMR    Review of Systems: Unable to obtain. Physical Exam:  /64, HR 60, RR 18, T 97.3, sat 97% on room air   Constitutional: Awake, alert, NAD  HEENT: Head NC/AT, hard of hearing, PEERL, MMM  Cardio: Regular rate, irregular rhythm, S1S2, no murmurs  Respiratory: Thorax symmetrical with no labored breathing. Clear to ausculation bilaterally with symmetrical breath sounds. No wheezing, rhonchi, or crackles. Extremities: No clubbing or cyanosis. Trace LE edema bilaterally. No calf tenderness. Neurologic: A&Ox1 to person. No gross motor deficits. Skin: Warm, dry.   Psych: Calm, cooperative. Confused. Results:  Covid-19 positive 11/12    Assessment and Plan:  Covid-19 positivity - positive on facility screening. He is asymptomatic and lungs are clear. As he has no symptoms, he is not candidate for antiviral therapy. Plan:  -Expectant management  -Isolation per protocol    Thank you for allowing me to participate in Hamzah's care.     Lesly Zuñiga PA-C  Pulmonary Medicine

## 2023-12-13 NOTE — PLAN OF CARE
Problem: Safety Risk - Non-Violent Restraints  Goal: Patient will remain free from self-harm  Description: INTERVENTIONS:  - Apply the least restrictive restraint to prevent harm  - Notify patient and family of reasons restraints applied  - Assess for any contributing factors to confusion (electrolyte disturbances, delirium, medications)  - Discontinue any unnecessary medical devices as soon as possible  - Assess the patient's physical comfort, circulation, skin condition, hydration, nutrition and elimination needs   - Reorient and redirection as needed  - Assess for the need to continue restraints  Outcome: Progressing Yes

## 2025-01-01 ENCOUNTER — HOSPITAL ENCOUNTER (INPATIENT)
Facility: HOSPITAL | Age: OVER 89
LOS: 25 days | End: 2025-01-01
Attending: INTERNAL MEDICINE | Admitting: INTERNAL MEDICINE

## 2025-01-01 ENCOUNTER — APPOINTMENT (OUTPATIENT)
Dept: CV DIAGNOSTICS | Facility: HOSPITAL | Age: OVER 89
End: 2025-01-01
Attending: INTERNAL MEDICINE

## 2025-01-01 ENCOUNTER — HOSPITAL ENCOUNTER (INPATIENT)
Facility: HOSPITAL | Age: OVER 89
LOS: 3 days | Discharge: HOSPICE | End: 2025-01-01
Attending: EMERGENCY MEDICINE | Admitting: INTERNAL MEDICINE

## 2025-01-01 ENCOUNTER — APPOINTMENT (OUTPATIENT)
Dept: GENERAL RADIOLOGY | Facility: HOSPITAL | Age: OVER 89
End: 2025-01-01
Attending: EMERGENCY MEDICINE

## 2025-01-01 ENCOUNTER — APPOINTMENT (OUTPATIENT)
Dept: PICC SERVICES | Facility: HOSPITAL | Age: OVER 89
End: 2025-01-01
Attending: HOSPITALIST

## 2025-01-01 ENCOUNTER — HOSPITAL ENCOUNTER (INPATIENT)
Facility: HOSPITAL | Age: OVER 89
LOS: 25 days | End: 2025-01-01
Attending: INTERNAL MEDICINE

## 2025-01-01 VITALS
HEART RATE: 94 BPM | OXYGEN SATURATION: 98 % | HEIGHT: 70 IN | BODY MASS INDEX: 18.85 KG/M2 | SYSTOLIC BLOOD PRESSURE: 123 MMHG | DIASTOLIC BLOOD PRESSURE: 59 MMHG | TEMPERATURE: 99 F | WEIGHT: 131.63 LBS | RESPIRATION RATE: 20 BRPM

## 2025-01-01 VITALS
TEMPERATURE: 97 F | SYSTOLIC BLOOD PRESSURE: 66 MMHG | RESPIRATION RATE: 22 BRPM | OXYGEN SATURATION: 90 % | DIASTOLIC BLOOD PRESSURE: 29 MMHG | HEART RATE: 66 BPM

## 2025-01-01 DIAGNOSIS — R09.02 HYPOXIA: ICD-10-CM

## 2025-01-01 DIAGNOSIS — R40.4 TRANSIENT ALTERATION OF AWARENESS: Primary | ICD-10-CM

## 2025-01-01 DIAGNOSIS — E87.0 HYPERNATREMIA: ICD-10-CM

## 2025-01-01 LAB
ANION GAP SERPL CALC-SCNC: 10 MMOL/L (ref 0–18)
ANION GAP SERPL CALC-SCNC: 12 MMOL/L (ref 0–18)
ANION GAP SERPL CALC-SCNC: 13 MMOL/L (ref 0–18)
ANION GAP SERPL CALC-SCNC: 7 MMOL/L (ref 0–18)
ANION GAP SERPL CALC-SCNC: 8 MMOL/L (ref 0–18)
ANION GAP SERPL CALC-SCNC: 8 MMOL/L (ref 0–18)
ANION GAP SERPL CALC-SCNC: 9 MMOL/L (ref 0–18)
ATRIAL RATE: 75 BPM
BASE EXCESS BLD CALC-SCNC: 2 MMOL/L (ref ?–2)
BASOPHILS # BLD AUTO: 0.03 X10(3) UL (ref 0–0.2)
BASOPHILS # BLD AUTO: 0.04 X10(3) UL (ref 0–0.2)
BASOPHILS # BLD AUTO: 0.05 X10(3) UL (ref 0–0.2)
BASOPHILS NFR BLD AUTO: 0.3 %
BASOPHILS NFR BLD AUTO: 0.3 %
BASOPHILS NFR BLD AUTO: 0.4 %
BILIRUB UR QL: NEGATIVE
BUN BLD-MCNC: 13 MG/DL (ref 9–23)
BUN BLD-MCNC: 14 MG/DL (ref 9–23)
BUN BLD-MCNC: 14 MG/DL (ref 9–23)
BUN BLD-MCNC: 15 MG/DL (ref 9–23)
BUN BLD-MCNC: 16 MG/DL (ref 9–23)
BUN BLD-MCNC: 19 MG/DL (ref 9–23)
BUN BLD-MCNC: 26 MG/DL (ref 9–23)
BUN BLD-MCNC: 29 MG/DL (ref 9–23)
BUN BLD-MCNC: 33 MG/DL (ref 9–23)
BUN BLD-MCNC: 47 MG/DL (ref 9–23)
BUN BLD-MCNC: 49 MG/DL (ref 9–23)
BUN BLD-MCNC: 9 MG/DL (ref 9–23)
BUN/CREAT SERPL: 13.1 (ref 10–20)
BUN/CREAT SERPL: 13.9 (ref 10–20)
BUN/CREAT SERPL: 14 (ref 10–20)
BUN/CREAT SERPL: 14.2 (ref 10–20)
BUN/CREAT SERPL: 14.4 (ref 10–20)
BUN/CREAT SERPL: 17.9 (ref 10–20)
BUN/CREAT SERPL: 22.2 (ref 10–20)
BUN/CREAT SERPL: 25.2 (ref 10–20)
BUN/CREAT SERPL: 29.5 (ref 10–20)
BUN/CREAT SERPL: 9.7 (ref 10–20)
CA-I BLD-SCNC: 1.12 MMOL/L (ref 0.95–1.32)
CALCIUM BLD-MCNC: 8.1 MG/DL (ref 8.7–10.4)
CALCIUM BLD-MCNC: 8.2 MG/DL (ref 8.7–10.4)
CALCIUM BLD-MCNC: 8.3 MG/DL (ref 8.7–10.4)
CALCIUM BLD-MCNC: 8.3 MG/DL (ref 8.7–10.4)
CALCIUM BLD-MCNC: 8.4 MG/DL (ref 8.7–10.4)
CALCIUM BLD-MCNC: 8.6 MG/DL (ref 8.7–10.4)
CALCIUM BLD-MCNC: 8.7 MG/DL (ref 8.7–10.4)
CALCIUM BLD-MCNC: 8.8 MG/DL (ref 8.7–10.4)
CALCIUM BLD-MCNC: 8.8 MG/DL (ref 8.7–10.4)
CALCIUM BLD-MCNC: 9.3 MG/DL (ref 8.7–10.4)
CHLORIDE SERPL-SCNC: 118 MMOL/L (ref 98–112)
CHLORIDE SERPL-SCNC: 118 MMOL/L (ref 98–112)
CHLORIDE SERPL-SCNC: 119 MMOL/L (ref 98–112)
CHLORIDE SERPL-SCNC: 119 MMOL/L (ref 98–112)
CHLORIDE SERPL-SCNC: 120 MMOL/L (ref 98–112)
CHLORIDE SERPL-SCNC: 120 MMOL/L (ref 98–112)
CHLORIDE SERPL-SCNC: 124 MMOL/L (ref 98–112)
CHLORIDE SERPL-SCNC: 127 MMOL/L (ref 98–112)
CHLORIDE SERPL-SCNC: 128 MMOL/L (ref 98–112)
CHLORIDE SERPL-SCNC: 129 MMOL/L (ref 98–112)
CHLORIDE SERPL-SCNC: 131 MMOL/L (ref 98–112)
CHLORIDE SERPL-SCNC: 131 MMOL/L (ref 98–112)
CO2 SERPL-SCNC: 21 MMOL/L (ref 21–32)
CO2 SERPL-SCNC: 23 MMOL/L (ref 21–32)
CO2 SERPL-SCNC: 24 MMOL/L (ref 21–32)
CO2 SERPL-SCNC: 25 MMOL/L (ref 21–32)
CO2 SERPL-SCNC: 26 MMOL/L (ref 21–32)
CO2 SERPL-SCNC: 27 MMOL/L (ref 21–32)
COHGB MFR BLD: 1.5 % (ref 0–3)
COLOR UR: YELLOW
CREAT BLD-MCNC: 0.93 MG/DL (ref 0.7–1.3)
CREAT BLD-MCNC: 0.99 MG/DL (ref 0.7–1.3)
CREAT BLD-MCNC: 1 MG/DL (ref 0.7–1.3)
CREAT BLD-MCNC: 1.01 MG/DL (ref 0.7–1.3)
CREAT BLD-MCNC: 1.04 MG/DL (ref 0.7–1.3)
CREAT BLD-MCNC: 1.06 MG/DL (ref 0.7–1.3)
CREAT BLD-MCNC: 1.12 MG/DL (ref 0.7–1.3)
CREAT BLD-MCNC: 1.13 MG/DL (ref 0.7–1.3)
CREAT BLD-MCNC: 1.15 MG/DL (ref 0.7–1.3)
CREAT BLD-MCNC: 1.17 MG/DL (ref 0.7–1.3)
CREAT BLD-MCNC: 1.44 MG/DL (ref 0.7–1.3)
CREAT BLD-MCNC: 1.66 MG/DL (ref 0.7–1.3)
DEPRECATED RDW RBC AUTO: 48.8 FL (ref 35.1–46.3)
DEPRECATED RDW RBC AUTO: 48.8 FL (ref 35.1–46.3)
DEPRECATED RDW RBC AUTO: 50.1 FL (ref 35.1–46.3)
DEPRECATED RDW RBC AUTO: 50.4 FL (ref 35.1–46.3)
DEPRECATED RDW RBC AUTO: 51.6 FL (ref 35.1–46.3)
EGFRCR SERPLBLD CKD-EPI 2021: 39 ML/MIN/1.73M2 (ref 60–?)
EGFRCR SERPLBLD CKD-EPI 2021: 46 ML/MIN/1.73M2 (ref 60–?)
EGFRCR SERPLBLD CKD-EPI 2021: 59 ML/MIN/1.73M2 (ref 60–?)
EGFRCR SERPLBLD CKD-EPI 2021: 60 ML/MIN/1.73M2 (ref 60–?)
EGFRCR SERPLBLD CKD-EPI 2021: 62 ML/MIN/1.73M2 (ref 60–?)
EGFRCR SERPLBLD CKD-EPI 2021: 62 ML/MIN/1.73M2 (ref 60–?)
EGFRCR SERPLBLD CKD-EPI 2021: 67 ML/MIN/1.73M2 (ref 60–?)
EGFRCR SERPLBLD CKD-EPI 2021: 68 ML/MIN/1.73M2 (ref 60–?)
EGFRCR SERPLBLD CKD-EPI 2021: 71 ML/MIN/1.73M2 (ref 60–?)
EGFRCR SERPLBLD CKD-EPI 2021: 71 ML/MIN/1.73M2 (ref 60–?)
EGFRCR SERPLBLD CKD-EPI 2021: 72 ML/MIN/1.73M2 (ref 60–?)
EGFRCR SERPLBLD CKD-EPI 2021: 78 ML/MIN/1.73M2 (ref 60–?)
EOSINOPHIL # BLD AUTO: 0.12 X10(3) UL (ref 0–0.7)
EOSINOPHIL # BLD AUTO: 0.26 X10(3) UL (ref 0–0.7)
EOSINOPHIL # BLD AUTO: 0.31 X10(3) UL (ref 0–0.7)
EOSINOPHIL NFR BLD AUTO: 1 %
EOSINOPHIL NFR BLD AUTO: 2.2 %
EOSINOPHIL NFR BLD AUTO: 3.3 %
ERYTHROCYTE [DISTWIDTH] IN BLOOD BY AUTOMATED COUNT: 12.9 % (ref 11–15)
ERYTHROCYTE [DISTWIDTH] IN BLOOD BY AUTOMATED COUNT: 13.3 % (ref 11–15)
ERYTHROCYTE [DISTWIDTH] IN BLOOD BY AUTOMATED COUNT: 13.3 % (ref 11–15)
ERYTHROCYTE [DISTWIDTH] IN BLOOD BY AUTOMATED COUNT: 13.6 % (ref 11–15)
ERYTHROCYTE [DISTWIDTH] IN BLOOD BY AUTOMATED COUNT: 13.7 % (ref 11–15)
GLUCOSE BLD-MCNC: 101 MG/DL (ref 70–99)
GLUCOSE BLD-MCNC: 104 MG/DL (ref 70–99)
GLUCOSE BLD-MCNC: 105 MG/DL (ref 70–99)
GLUCOSE BLD-MCNC: 107 MG/DL (ref 70–99)
GLUCOSE BLD-MCNC: 112 MG/DL (ref 70–99)
GLUCOSE BLD-MCNC: 115 MG/DL (ref 70–99)
GLUCOSE BLD-MCNC: 115 MG/DL (ref 70–99)
GLUCOSE BLD-MCNC: 117 MG/DL (ref 70–99)
GLUCOSE BLD-MCNC: 117 MG/DL (ref 70–99)
GLUCOSE BLD-MCNC: 178 MG/DL (ref 70–99)
GLUCOSE BLD-MCNC: 87 MG/DL (ref 70–99)
GLUCOSE BLD-MCNC: 98 MG/DL (ref 70–99)
GLUCOSE BLDC GLUCOMTR-MCNC: 102 MG/DL (ref 70–99)
GLUCOSE BLDC GLUCOMTR-MCNC: 103 MG/DL (ref 70–99)
GLUCOSE BLDC GLUCOMTR-MCNC: 103 MG/DL (ref 70–99)
GLUCOSE BLDC GLUCOMTR-MCNC: 110 MG/DL (ref 70–99)
GLUCOSE BLDC GLUCOMTR-MCNC: 117 MG/DL (ref 70–99)
GLUCOSE BLDC GLUCOMTR-MCNC: 129 MG/DL (ref 70–99)
GLUCOSE BLDC GLUCOMTR-MCNC: 131 MG/DL (ref 70–99)
GLUCOSE BLDC GLUCOMTR-MCNC: 136 MG/DL (ref 70–99)
GLUCOSE BLDC GLUCOMTR-MCNC: 194 MG/DL (ref 70–99)
GLUCOSE BLDC GLUCOMTR-MCNC: 43 MG/DL (ref 70–99)
GLUCOSE BLDC GLUCOMTR-MCNC: 61 MG/DL (ref 70–99)
GLUCOSE BLDC GLUCOMTR-MCNC: 66 MG/DL (ref 70–99)
GLUCOSE BLDC GLUCOMTR-MCNC: 68 MG/DL (ref 70–99)
GLUCOSE BLDC GLUCOMTR-MCNC: 70 MG/DL (ref 70–99)
GLUCOSE BLDC GLUCOMTR-MCNC: 78 MG/DL (ref 70–99)
GLUCOSE BLDC GLUCOMTR-MCNC: 86 MG/DL (ref 70–99)
GLUCOSE BLDC GLUCOMTR-MCNC: 86 MG/DL (ref 70–99)
GLUCOSE BLDC GLUCOMTR-MCNC: 88 MG/DL (ref 70–99)
GLUCOSE BLDC GLUCOMTR-MCNC: 89 MG/DL (ref 70–99)
GLUCOSE BLDC GLUCOMTR-MCNC: 90 MG/DL (ref 70–99)
GLUCOSE BLDC GLUCOMTR-MCNC: 96 MG/DL (ref 70–99)
GLUCOSE BLDC GLUCOMTR-MCNC: 97 MG/DL (ref 70–99)
GLUCOSE UR-MCNC: NORMAL MG/DL
HCO3 BLDA-SCNC: 26.5 MEQ/L (ref 21–27)
HCT VFR BLD AUTO: 37.8 % (ref 39–53)
HCT VFR BLD AUTO: 37.8 % (ref 39–53)
HCT VFR BLD AUTO: 41.9 % (ref 39–53)
HCT VFR BLD AUTO: 45.2 % (ref 39–53)
HCT VFR BLD AUTO: 46 % (ref 39–53)
HGB BLD-MCNC: 11.3 G/DL (ref 13–17.5)
HGB BLD-MCNC: 11.3 G/DL (ref 13–17.5)
HGB BLD-MCNC: 12.4 G/DL (ref 13–17.5)
HGB BLD-MCNC: 13.1 G/DL (ref 13–17.5)
HGB BLD-MCNC: 14 G/DL (ref 13–17.5)
HGB BLD-MCNC: 14.1 G/DL (ref 13–17.5)
HYALINE CASTS #/AREA URNS AUTO: PRESENT /LPF
IMM GRANULOCYTES # BLD AUTO: 0.03 X10(3) UL (ref 0–1)
IMM GRANULOCYTES NFR BLD: 0.2 %
IMM GRANULOCYTES NFR BLD: 0.3 %
IMM GRANULOCYTES NFR BLD: 0.3 %
KETONES UR-MCNC: NEGATIVE MG/DL
LACTATE BLD-SCNC: 1 MMOL/L (ref 0.5–2)
LEUKOCYTE ESTERASE UR QL STRIP.AUTO: 250
LYMPHOCYTES # BLD AUTO: 1.8 X10(3) UL (ref 1–4)
LYMPHOCYTES # BLD AUTO: 2.02 X10(3) UL (ref 1–4)
LYMPHOCYTES # BLD AUTO: 2.24 X10(3) UL (ref 1–4)
LYMPHOCYTES NFR BLD AUTO: 17.3 %
LYMPHOCYTES NFR BLD AUTO: 18 %
LYMPHOCYTES NFR BLD AUTO: 19.1 %
MCH RBC QN AUTO: 29.7 PG (ref 26–34)
MCH RBC QN AUTO: 29.7 PG (ref 26–34)
MCH RBC QN AUTO: 29.9 PG (ref 26–34)
MCH RBC QN AUTO: 30.3 PG (ref 26–34)
MCH RBC QN AUTO: 30.4 PG (ref 26–34)
MCHC RBC AUTO-ENTMCNC: 29 G/DL (ref 31–37)
MCHC RBC AUTO-ENTMCNC: 29.6 G/DL (ref 31–37)
MCHC RBC AUTO-ENTMCNC: 29.9 G/DL (ref 31–37)
MCHC RBC AUTO-ENTMCNC: 29.9 G/DL (ref 31–37)
MCHC RBC AUTO-ENTMCNC: 30.7 G/DL (ref 31–37)
MCV RBC AUTO: 101 FL (ref 80–100)
MCV RBC AUTO: 104.6 FL (ref 80–100)
MCV RBC AUTO: 99.1 FL (ref 80–100)
MCV RBC AUTO: 99.2 FL (ref 80–100)
MCV RBC AUTO: 99.2 FL (ref 80–100)
METHGB MFR BLD: 1.6 % SAT (ref 0.4–1.5)
MONOCYTES # BLD AUTO: 0.7 X10(3) UL (ref 0.1–1)
MONOCYTES # BLD AUTO: 0.73 X10(3) UL (ref 0.1–1)
MONOCYTES # BLD AUTO: 0.92 X10(3) UL (ref 0.1–1)
MONOCYTES NFR BLD AUTO: 6 %
MONOCYTES NFR BLD AUTO: 7.4 %
MONOCYTES NFR BLD AUTO: 7.8 %
MRSA DNA SPEC QL NAA+PROBE: NEGATIVE
NEUTROPHILS # BLD AUTO: 6.51 X10 (3) UL (ref 1.5–7.7)
NEUTROPHILS # BLD AUTO: 6.51 X10(3) UL (ref 1.5–7.7)
NEUTROPHILS # BLD AUTO: 8.62 X10 (3) UL (ref 1.5–7.7)
NEUTROPHILS # BLD AUTO: 8.62 X10(3) UL (ref 1.5–7.7)
NEUTROPHILS # BLD AUTO: 9.05 X10 (3) UL (ref 1.5–7.7)
NEUTROPHILS # BLD AUTO: 9.05 X10(3) UL (ref 1.5–7.7)
NEUTROPHILS NFR BLD AUTO: 69.2 %
NEUTROPHILS NFR BLD AUTO: 73 %
NEUTROPHILS NFR BLD AUTO: 73.9 %
NITRITE UR QL STRIP.AUTO: NEGATIVE
NT-PROBNP SERPL-MCNC: 453 PG/ML (ref ?–450)
O2 CT BLD-SCNC: 19.3 VOL% (ref 15–23)
OSMOLALITY SERPL CALC.SUM OF ELEC: 308 MOSM/KG (ref 275–295)
OSMOLALITY SERPL CALC.SUM OF ELEC: 310 MOSM/KG (ref 275–295)
OSMOLALITY SERPL CALC.SUM OF ELEC: 312 MOSM/KG (ref 275–295)
OSMOLALITY SERPL CALC.SUM OF ELEC: 313 MOSM/KG (ref 275–295)
OSMOLALITY SERPL CALC.SUM OF ELEC: 313 MOSM/KG (ref 275–295)
OSMOLALITY SERPL CALC.SUM OF ELEC: 319 MOSM/KG (ref 275–295)
OSMOLALITY SERPL CALC.SUM OF ELEC: 327 MOSM/KG (ref 275–295)
OSMOLALITY SERPL CALC.SUM OF ELEC: 337 MOSM/KG (ref 275–295)
OSMOLALITY SERPL CALC.SUM OF ELEC: 347 MOSM/KG (ref 275–295)
OSMOLALITY SERPL CALC.SUM OF ELEC: 349 MOSM/KG (ref 275–295)
OXYGEN LITERS/MINUTE: 15 L/MIN
P AXIS: 90 DEGREES
P-R INTERVAL: 168 MS
PCO2 BLDA: 36 MM HG (ref 35–45)
PH BLDA: 7.46 (ref 7.35–7.45)
PH UR: 5 (ref 5–8)
PLATELET # BLD AUTO: 166 10(3)UL (ref 150–450)
PLATELET # BLD AUTO: 195 10(3)UL (ref 150–450)
PLATELET # BLD AUTO: 195 10(3)UL (ref 150–450)
PLATELET # BLD AUTO: 245 10(3)UL (ref 150–450)
PLATELET # BLD AUTO: 274 10(3)UL (ref 150–450)
PO2 BLDA: 197 MM HG (ref 80–100)
POTASSIUM BLD-SCNC: 3.6 MMOL/L (ref 3.6–5.1)
POTASSIUM SERPL-SCNC: 3.4 MMOL/L (ref 3.5–5.1)
POTASSIUM SERPL-SCNC: 3.6 MMOL/L (ref 3.5–5.1)
POTASSIUM SERPL-SCNC: 3.7 MMOL/L (ref 3.5–5.1)
POTASSIUM SERPL-SCNC: 3.8 MMOL/L (ref 3.5–5.1)
POTASSIUM SERPL-SCNC: 3.9 MMOL/L (ref 3.5–5.1)
POTASSIUM SERPL-SCNC: 4 MMOL/L (ref 3.5–5.1)
POTASSIUM SERPL-SCNC: 4.1 MMOL/L (ref 3.5–5.1)
POTASSIUM SERPL-SCNC: 4.8 MMOL/L (ref 3.5–5.1)
PROT UR-MCNC: 20 MG/DL
PUNCTURE CHARGE: YES
Q-T INTERVAL: 344 MS
Q-T INTERVAL: 434 MS
QRS DURATION: 74 MS
QRS DURATION: 82 MS
QTC CALCULATION (BEZET): 434 MS
QTC CALCULATION (BEZET): 484 MS
R AXIS: -21 DEGREES
R AXIS: -29 DEGREES
RBC # BLD AUTO: 3.81 X10(6)UL (ref 3.8–5.8)
RBC # BLD AUTO: 3.81 X10(6)UL (ref 3.8–5.8)
RBC # BLD AUTO: 4.15 X10(6)UL (ref 3.8–5.8)
RBC # BLD AUTO: 4.32 X10(6)UL (ref 3.8–5.8)
RBC # BLD AUTO: 4.64 X10(6)UL (ref 3.8–5.8)
RBC #/AREA URNS AUTO: >10 /HPF
SAO2 % BLDA: 98.6 % (ref 94–100)
SODIUM BLD-SCNC: 164 MMOL/L (ref 135–145)
SODIUM SERPL-SCNC: 149 MMOL/L (ref 136–145)
SODIUM SERPL-SCNC: 149 MMOL/L (ref 136–145)
SODIUM SERPL-SCNC: 151 MMOL/L (ref 136–145)
SODIUM SERPL-SCNC: 154 MMOL/L (ref 136–145)
SODIUM SERPL-SCNC: 155 MMOL/L (ref 136–145)
SODIUM SERPL-SCNC: 161 MMOL/L (ref 136–145)
SODIUM SERPL-SCNC: 165 MMOL/L (ref 136–145)
SODIUM SERPL-SCNC: 165 MMOL/L (ref 136–145)
SODIUM SERPL-SCNC: 166 MMOL/L (ref 136–145)
SODIUM SERPL-SCNC: 167 MMOL/L (ref 136–145)
SODIUM SERPL-SCNC: 84 MMOL/L
SP GR UR STRIP: 1.03 (ref 1–1.03)
T AXIS: -28 DEGREES
T AXIS: 7 DEGREES
TROPONIN I SERPL HS-MCNC: 21 NG/L (ref ?–53)
UROBILINOGEN UR STRIP-ACNC: 2
VENTRICULAR RATE: 75 BPM
VENTRICULAR RATE: 96 BPM
WBC # BLD AUTO: 11.7 X10(3) UL (ref 4–11)
WBC # BLD AUTO: 12.4 X10(3) UL (ref 4–11)
WBC # BLD AUTO: 7.1 X10(3) UL (ref 4–11)
WBC # BLD AUTO: 9.4 X10(3) UL (ref 4–11)
WBC # BLD AUTO: 9.4 X10(3) UL (ref 4–11)

## 2025-01-01 PROCEDURE — 99232 SBSQ HOSP IP/OBS MODERATE 35: CPT | Performed by: NURSE PRACTITIONER

## 2025-01-01 PROCEDURE — 93306 TTE W/DOPPLER COMPLETE: CPT | Performed by: INTERNAL MEDICINE

## 2025-01-01 PROCEDURE — 99223 1ST HOSP IP/OBS HIGH 75: CPT | Performed by: INTERNAL MEDICINE

## 2025-01-01 PROCEDURE — 99233 SBSQ HOSP IP/OBS HIGH 50: CPT | Performed by: INTERNAL MEDICINE

## 2025-01-01 PROCEDURE — 71045 X-RAY EXAM CHEST 1 VIEW: CPT | Performed by: EMERGENCY MEDICINE

## 2025-01-01 PROCEDURE — 99291 CRITICAL CARE FIRST HOUR: CPT | Performed by: INTERNAL MEDICINE

## 2025-01-01 PROCEDURE — 99232 SBSQ HOSP IP/OBS MODERATE 35: CPT | Performed by: INTERNAL MEDICINE

## 2025-01-01 RX ORDER — DOCUSATE SODIUM 50 MG/5ML
100 LIQUID ORAL 2 TIMES DAILY
Status: DISCONTINUED | OUTPATIENT
Start: 2025-01-01 | End: 2025-01-01

## 2025-01-01 RX ORDER — DEXTROSE MONOHYDRATE 50 MG/ML
INJECTION, SOLUTION INTRAVENOUS CONTINUOUS
Status: DISCONTINUED | OUTPATIENT
Start: 2025-01-01 | End: 2025-01-01

## 2025-01-01 RX ORDER — MORPHINE SULFATE 20 MG/ML
10 SOLUTION ORAL EVERY 4 HOURS
Refills: 0 | Status: DISCONTINUED | OUTPATIENT
Start: 2025-01-01 | End: 2025-01-01

## 2025-01-01 RX ORDER — MIRTAZAPINE 15 MG/1
15 TABLET, FILM COATED ORAL NIGHTLY
Status: CANCELLED | OUTPATIENT
Start: 2025-01-01

## 2025-01-01 RX ORDER — MIRTAZAPINE 15 MG/1
15 TABLET, FILM COATED ORAL NIGHTLY
Status: DISCONTINUED | OUTPATIENT
Start: 2025-01-01 | End: 2025-01-01

## 2025-01-01 RX ORDER — DIAZEPAM 10 MG/2ML
5 INJECTION, SOLUTION INTRAMUSCULAR; INTRAVENOUS EVERY 4 HOURS PRN
Status: DISCONTINUED | OUTPATIENT
Start: 2025-01-01 | End: 2025-01-01

## 2025-01-01 RX ORDER — OLANZAPINE 5 MG/1
5 TABLET, ORALLY DISINTEGRATING ORAL EVERY 8 HOURS PRN
Status: CANCELLED | OUTPATIENT
Start: 2025-01-01

## 2025-01-01 RX ORDER — LORAZEPAM 2 MG/ML
1 INJECTION INTRAMUSCULAR
Status: DISCONTINUED | OUTPATIENT
Start: 2025-01-01 | End: 2025-01-01

## 2025-01-01 RX ORDER — POLYETHYLENE GLYCOL 3350 17 G/17G
17 POWDER, FOR SOLUTION ORAL DAILY PRN
Status: DISCONTINUED | OUTPATIENT
Start: 2025-01-01 | End: 2025-01-01

## 2025-01-01 RX ORDER — MEMANTINE HYDROCHLORIDE 5 MG/1
10 TABLET ORAL NIGHTLY
Status: DISCONTINUED | OUTPATIENT
Start: 2025-01-01 | End: 2025-01-01

## 2025-01-01 RX ORDER — ONDANSETRON 2 MG/ML
4 INJECTION INTRAMUSCULAR; INTRAVENOUS EVERY 6 HOURS PRN
Status: DISCONTINUED | OUTPATIENT
Start: 2025-01-01 | End: 2025-01-01

## 2025-01-01 RX ORDER — TAMSULOSIN HYDROCHLORIDE 0.4 MG/1
0.4 CAPSULE ORAL DAILY
Status: DISCONTINUED | OUTPATIENT
Start: 2025-01-01 | End: 2025-01-01

## 2025-01-01 RX ORDER — NICOTINE POLACRILEX 4 MG
30 LOZENGE BUCCAL
Status: DISCONTINUED | OUTPATIENT
Start: 2025-01-01 | End: 2025-01-01

## 2025-01-01 RX ORDER — LORAZEPAM 2 MG/ML
1 INJECTION INTRAMUSCULAR EVERY 6 HOURS
Status: DISCONTINUED | OUTPATIENT
Start: 2025-01-01 | End: 2025-01-01

## 2025-01-01 RX ORDER — LORAZEPAM 2 MG/ML
2 INJECTION INTRAMUSCULAR
Status: DISCONTINUED | OUTPATIENT
Start: 2025-01-01 | End: 2025-01-01

## 2025-01-01 RX ORDER — HALOPERIDOL 1 MG/1
1 TABLET ORAL 2 TIMES DAILY
Status: CANCELLED | OUTPATIENT
Start: 2025-01-01

## 2025-01-01 RX ORDER — MORPHINE SULFATE 20 MG/ML
5 SOLUTION ORAL EVERY 4 HOURS
Refills: 0 | Status: DISCONTINUED | OUTPATIENT
Start: 2025-01-01 | End: 2025-01-01

## 2025-01-01 RX ORDER — LORAZEPAM 2 MG/ML
1 CONCENTRATE ORAL EVERY 8 HOURS
Status: DISCONTINUED | OUTPATIENT
Start: 2025-01-01 | End: 2025-01-01

## 2025-01-01 RX ORDER — DEXTROSE MONOHYDRATE 100 MG/ML
INJECTION, SOLUTION INTRAVENOUS CONTINUOUS
Status: DISCONTINUED | OUTPATIENT
Start: 2025-01-01 | End: 2025-01-01

## 2025-01-01 RX ORDER — DEXTROSE MONOHYDRATE 25 G/50ML
50 INJECTION, SOLUTION INTRAVENOUS
Status: DISCONTINUED | OUTPATIENT
Start: 2025-01-01 | End: 2025-01-01

## 2025-01-01 RX ORDER — DEXTROSE MONOHYDRATE 50 MG/ML
INJECTION, SOLUTION INTRAVENOUS CONTINUOUS
Status: CANCELLED | OUTPATIENT
Start: 2025-01-01

## 2025-01-01 RX ORDER — LORAZEPAM 2 MG/ML
1 INJECTION INTRAMUSCULAR EVERY 4 HOURS PRN
Status: DISCONTINUED | OUTPATIENT
Start: 2025-01-01 | End: 2025-01-01

## 2025-01-01 RX ORDER — LORAZEPAM 2 MG/ML
2 INJECTION INTRAMUSCULAR EVERY 4 HOURS PRN
Status: DISCONTINUED | OUTPATIENT
Start: 2025-01-01 | End: 2025-01-01

## 2025-01-01 RX ORDER — MORPHINE SULFATE 2 MG/ML
1 INJECTION, SOLUTION INTRAMUSCULAR; INTRAVENOUS
Status: DISCONTINUED | OUTPATIENT
Start: 2025-01-01 | End: 2025-01-01

## 2025-01-01 RX ORDER — HALOPERIDOL 1 MG/1
1 TABLET ORAL 2 TIMES DAILY
Status: DISCONTINUED | OUTPATIENT
Start: 2025-01-01 | End: 2025-01-01

## 2025-01-01 RX ORDER — ACETAMINOPHEN 500 MG
500 TABLET ORAL EVERY 6 HOURS PRN
Status: DISCONTINUED | OUTPATIENT
Start: 2025-01-01 | End: 2025-01-01

## 2025-01-01 RX ORDER — LORAZEPAM 2 MG/ML
1 CONCENTRATE ORAL EVERY 6 HOURS
Status: DISCONTINUED | OUTPATIENT
Start: 2025-01-01 | End: 2025-01-01

## 2025-01-01 RX ORDER — NICOTINE POLACRILEX 4 MG
15 LOZENGE BUCCAL
Status: DISCONTINUED | OUTPATIENT
Start: 2025-01-01 | End: 2025-01-01

## 2025-01-01 RX ORDER — HYDROMORPHONE HYDROCHLORIDE 1 MG/ML
0.4 INJECTION, SOLUTION INTRAMUSCULAR; INTRAVENOUS; SUBCUTANEOUS EVERY 2 HOUR PRN
Refills: 0 | Status: DISCONTINUED | OUTPATIENT
Start: 2025-01-01 | End: 2025-01-01

## 2025-01-01 RX ORDER — HYDROMORPHONE HYDROCHLORIDE 1 MG/ML
0.8 INJECTION, SOLUTION INTRAMUSCULAR; INTRAVENOUS; SUBCUTANEOUS EVERY 2 HOUR PRN
Refills: 0 | Status: DISCONTINUED | OUTPATIENT
Start: 2025-01-01 | End: 2025-01-01

## 2025-01-01 RX ORDER — DIAZEPAM 10 MG/2ML
2.5 INJECTION, SOLUTION INTRAMUSCULAR; INTRAVENOUS EVERY 6 HOURS PRN
Status: DISCONTINUED | OUTPATIENT
Start: 2025-01-01 | End: 2025-01-01

## 2025-01-01 RX ORDER — MORPHINE SULFATE 4 MG/ML
4 INJECTION, SOLUTION INTRAMUSCULAR; INTRAVENOUS
Status: DISCONTINUED | OUTPATIENT
Start: 2025-01-01 | End: 2025-01-01

## 2025-01-01 RX ORDER — HEPARIN SODIUM 5000 [USP'U]/ML
5000 INJECTION, SOLUTION INTRAVENOUS; SUBCUTANEOUS EVERY 8 HOURS SCHEDULED
Status: DISCONTINUED | OUTPATIENT
Start: 2025-01-01 | End: 2025-01-01

## 2025-01-01 RX ORDER — POLYETHYLENE GLYCOL 3350 17 G/17G
17 POWDER, FOR SOLUTION ORAL DAILY PRN
Status: CANCELLED | OUTPATIENT
Start: 2025-01-01

## 2025-01-01 RX ORDER — GLYCOPYRROLATE 0.2 MG/ML
0.4 INJECTION, SOLUTION INTRAMUSCULAR; INTRAVENOUS
Status: DISCONTINUED | OUTPATIENT
Start: 2025-01-01 | End: 2025-01-01

## 2025-01-01 RX ORDER — ATROPINE SULFATE 10 MG/ML
2 SOLUTION/ DROPS OPHTHALMIC
Status: DISCONTINUED | OUTPATIENT
Start: 2025-01-01 | End: 2025-01-01

## 2025-01-01 RX ORDER — SCOPOLAMINE 1 MG/3D
1 PATCH, EXTENDED RELEASE TRANSDERMAL
Status: DISCONTINUED | OUTPATIENT
Start: 2025-01-01 | End: 2025-01-01

## 2025-01-01 RX ORDER — HALOPERIDOL 5 MG/ML
2 INJECTION INTRAMUSCULAR EVERY 4 HOURS PRN
Status: DISCONTINUED | OUTPATIENT
Start: 2025-01-01 | End: 2025-01-01

## 2025-01-01 RX ORDER — LORAZEPAM 0.5 MG/1
1 TABLET ORAL EVERY 4 HOURS PRN
Status: DISCONTINUED | OUTPATIENT
Start: 2025-01-01 | End: 2025-01-01

## 2025-01-01 RX ORDER — MIRTAZAPINE 15 MG/1
15 TABLET, FILM COATED ORAL NIGHTLY
COMMUNITY

## 2025-01-01 RX ORDER — ONDANSETRON 4 MG/1
4 TABLET, ORALLY DISINTEGRATING ORAL EVERY 6 HOURS PRN
Status: DISCONTINUED | OUTPATIENT
Start: 2025-01-01 | End: 2025-01-01

## 2025-01-01 RX ORDER — HALOPERIDOL 5 MG/ML
1 INJECTION INTRAMUSCULAR
Status: DISCONTINUED | OUTPATIENT
Start: 2025-01-01 | End: 2025-01-01

## 2025-01-01 RX ORDER — HYDROMORPHONE HYDROCHLORIDE 1 MG/ML
0.2 INJECTION, SOLUTION INTRAMUSCULAR; INTRAVENOUS; SUBCUTANEOUS
Refills: 0 | Status: DISCONTINUED | OUTPATIENT
Start: 2025-01-01 | End: 2025-01-01

## 2025-01-01 RX ORDER — SODIUM CHLORIDE 0.9 % (FLUSH) 0.9 %
10 SYRINGE (ML) INJECTION AS NEEDED
Status: DISCONTINUED | OUTPATIENT
Start: 2025-01-01 | End: 2025-01-01

## 2025-01-01 RX ORDER — OLANZAPINE 5 MG/1
5 TABLET, ORALLY DISINTEGRATING ORAL EVERY 8 HOURS PRN
Status: DISCONTINUED | OUTPATIENT
Start: 2025-01-01 | End: 2025-01-01

## 2025-01-01 RX ORDER — MORPHINE SULFATE 2 MG/ML
2 INJECTION, SOLUTION INTRAMUSCULAR; INTRAVENOUS
Status: DISCONTINUED | OUTPATIENT
Start: 2025-01-01 | End: 2025-01-01

## 2025-01-01 RX ORDER — LORAZEPAM 2 MG/ML
0.5 INJECTION INTRAMUSCULAR EVERY 4 HOURS PRN
Status: DISCONTINUED | OUTPATIENT
Start: 2025-01-01 | End: 2025-01-01

## 2025-01-01 RX ORDER — ACETAMINOPHEN 10 MG/ML
1000 INJECTION, SOLUTION INTRAVENOUS EVERY 6 HOURS PRN
Status: DISCONTINUED | OUTPATIENT
Start: 2025-01-01 | End: 2025-01-01

## 2025-01-01 RX ORDER — MEMANTINE HYDROCHLORIDE 5 MG/1
10 TABLET ORAL NIGHTLY
Status: CANCELLED | OUTPATIENT
Start: 2025-01-01

## 2025-01-01 RX ORDER — HALOPERIDOL 5 MG/ML
2 INJECTION INTRAMUSCULAR
Status: DISCONTINUED | OUTPATIENT
Start: 2025-01-01 | End: 2025-01-01

## 2025-01-01 RX ORDER — MIRTAZAPINE 7.5 MG/1
15 TABLET, FILM COATED ORAL NIGHTLY
Status: DISCONTINUED | OUTPATIENT
Start: 2025-01-01 | End: 2025-01-01

## 2025-01-01 RX ORDER — MEMANTINE HYDROCHLORIDE 10 MG/1
10 TABLET ORAL NIGHTLY
COMMUNITY
Start: 2025-01-01 | End: 2025-01-01

## 2025-01-01 RX ORDER — QUETIAPINE FUMARATE 25 MG/1
25 TABLET, FILM COATED ORAL 3 TIMES DAILY
Status: DISCONTINUED | OUTPATIENT
Start: 2025-01-01 | End: 2025-01-01

## 2025-01-01 RX ORDER — BISACODYL 10 MG
10 SUPPOSITORY, RECTAL RECTAL
Status: DISCONTINUED | OUTPATIENT
Start: 2025-01-01 | End: 2025-01-01

## 2025-01-01 RX ORDER — LORAZEPAM 2 MG/ML
2 CONCENTRATE ORAL EVERY 8 HOURS
Status: DISCONTINUED | OUTPATIENT
Start: 2025-01-01 | End: 2025-01-01

## 2025-01-01 RX ORDER — DOCUSATE SODIUM 100 MG/1
100 CAPSULE, LIQUID FILLED ORAL 2 TIMES DAILY
Status: DISCONTINUED | OUTPATIENT
Start: 2025-01-01 | End: 2025-01-01

## 2025-01-01 RX ORDER — HALOPERIDOL 1 MG/1
1 TABLET ORAL 2 TIMES DAILY
COMMUNITY
Start: 2025-01-01 | End: 2025-01-01

## 2025-01-01 RX ORDER — LORAZEPAM 2 MG/ML
1 CONCENTRATE ORAL
Status: DISCONTINUED | OUTPATIENT
Start: 2025-01-01 | End: 2025-01-01

## 2025-01-01 RX ORDER — DOCUSATE SODIUM 50 MG/5ML
100 LIQUID ORAL 2 TIMES DAILY
Status: CANCELLED | OUTPATIENT
Start: 2025-01-01

## 2025-01-01 RX ORDER — DEXTROSE MONOHYDRATE 25 G/50ML
INJECTION, SOLUTION INTRAVENOUS
Status: COMPLETED
Start: 2025-01-01 | End: 2025-01-01

## 2025-01-01 RX ORDER — DEXTROSE MONOHYDRATE AND SODIUM CHLORIDE 5; .45 G/100ML; G/100ML
INJECTION, SOLUTION INTRAVENOUS CONTINUOUS
Status: DISCONTINUED | OUTPATIENT
Start: 2025-01-01 | End: 2025-01-01

## 2025-01-01 RX ORDER — MORPHINE SULFATE 20 MG/ML
10 SOLUTION ORAL EVERY 2 HOUR PRN
Refills: 0 | Status: DISCONTINUED | OUTPATIENT
Start: 2025-01-01 | End: 2025-01-01

## 2025-05-13 ENCOUNTER — HOSPITAL ENCOUNTER (EMERGENCY)
Facility: HOSPITAL | Age: OVER 89
Discharge: HOME | End: 2025-05-14
Attending: EMERGENCY MEDICINE
Payer: MEDICARE

## 2025-05-13 DIAGNOSIS — F01.518 VASCULAR DEMENTIA WITH BEHAVIORAL DISTURBANCE (HCC): Primary | ICD-10-CM

## 2025-05-13 DIAGNOSIS — N30.00 ACUTE CYSTITIS WITHOUT HEMATURIA: ICD-10-CM

## 2025-05-13 PROCEDURE — 99284 EMERGENCY DEPT VISIT MOD MDM: CPT

## 2025-05-14 VITALS
HEART RATE: 68 BPM | OXYGEN SATURATION: 99 % | TEMPERATURE: 99 F | DIASTOLIC BLOOD PRESSURE: 78 MMHG | RESPIRATION RATE: 18 BRPM | SYSTOLIC BLOOD PRESSURE: 112 MMHG

## 2025-05-14 LAB
BILIRUB UR QL: NEGATIVE
GLUCOSE UR-MCNC: NORMAL MG/DL
HGB UR QL STRIP.AUTO: NEGATIVE
KETONES UR-MCNC: NEGATIVE MG/DL
LEUKOCYTE ESTERASE UR QL STRIP.AUTO: 250
PH UR: 5.5 [PH] (ref 5–8)
PROT UR-MCNC: NEGATIVE MG/DL
SP GR UR STRIP: 1.01 (ref 1–1.03)
UROBILINOGEN UR STRIP-ACNC: NORMAL
WBC #/AREA URNS AUTO: >50 /HPF
WBC CLUMPS UR QL AUTO: PRESENT /HPF

## 2025-05-14 PROCEDURE — 87086 URINE CULTURE/COLONY COUNT: CPT | Performed by: EMERGENCY MEDICINE

## 2025-05-14 PROCEDURE — 81001 URINALYSIS AUTO W/SCOPE: CPT | Performed by: EMERGENCY MEDICINE

## 2025-05-14 PROCEDURE — 87077 CULTURE AEROBIC IDENTIFY: CPT | Performed by: EMERGENCY MEDICINE

## 2025-05-14 PROCEDURE — 87186 SC STD MICRODIL/AGAR DIL: CPT | Performed by: EMERGENCY MEDICINE

## 2025-05-14 RX ORDER — CEPHALEXIN 500 MG/1
500 CAPSULE ORAL ONCE
Status: COMPLETED | OUTPATIENT
Start: 2025-05-14 | End: 2025-05-14

## 2025-05-14 RX ORDER — CEPHALEXIN 500 MG/1
500 CAPSULE ORAL 3 TIMES DAILY
Qty: 21 CAPSULE | Refills: 0 | Status: SHIPPED | OUTPATIENT
Start: 2025-05-14 | End: 2025-05-21

## 2025-05-14 NOTE — ED INITIAL ASSESSMENT (HPI)
Pt to ER from NH with c/o increased verbal aggression tonight. Spoke with staff via phone who report pt with hx dementia will typically have symptoms of [place] syndrome each night. Pt took his normal PM meds but staff reports he seemed more upset tonight. Staff denies physical aggression. Pt without complaints and states \" I feel great.\"

## 2025-05-14 NOTE — ED PROVIDER NOTES
Patient Seen in: [facility] Emergency Department      History     Chief Complaint   Patient presents with    Eval-P     Stated Complaint: eval-P    Subjective:   HPI  History of Present Illness            89-year-old male with worsening low out of his ordinary tonight with increased verbal physical behavior.  He has sundowning in the evenings.  He took his evening medications but was more combative so the nursing home sent him out for further evaluation.  The patient was sleeping here when I arrived in his room.  He has no complaints.  He knows he is at the hospital.      Objective:     Past Medical History:    Benign prostatic hypertrophy    Blepharitis    OU    Cataract    OD    Cataract    OS    Chalazion of left upper eyelid    OS    Diverticulitis    Floaters    OS    KIDNEY STONE    MGD (meibomian gland dysfunction)    OU    Neurogenic bladder    self caths TID    Other and unspecified hyperlipidemia    OTHER DISEASES    Hypogonadism    Pinguecula    OU              Past Surgical History:   Procedure Laterality Date    Appendectomy  1943    Colonoscopy,diagnostic  8/14/09    5mm ascending and sigmoid adenomatous polyps, diverticulosis    Colonoscopy,diagnostic  10/2/15    sigmoid polyp, diverticulosis    Colonoscopy,remv lesn,snare N/A 10/2/2015    Procedure: COLONOSCOPY, POSSIBLE BIOPSY, POSSIBLE POLYPECTOMY 19017;  Surgeon: Emre Arellano MD;  Location: [facility]    Hernia surgery  8/27/09    Umbilical hernia GSH Dr Gresham    Hernia surgery  12/08/2020    [facility]    Knee replacement surgery Right 3/06    R TKR  Serafin Diamond    Other surgical history      knee surgery    Other surgical history      doris fx with repair    Other surgical history  1976    Fx R leg ORIF #2 arthotomy procedures    Other surgical history  7-6-11    cysto, dr melchor    Other surgical history  8-11-11    cysto, laser litho bladder stone, laser shaving of prostate Dr Melchor    Other surgical history  7/21/16     cystoscopy dr melchor     Other surgical history  16    interstim pne dr melchor    Patient documented not to have experienced any of the following events N/A 10/2/2015    Procedure: COLONOSCOPY, POSSIBLE BIOPSY, POSSIBLE POLYPECTOMY 44380;  Surgeon: Emre Arellano MD;  Location: [facility]    Patient withough preoperative order for iv antibiotic surgical site infection prophylaxis. N/A 10/2/2015    Procedure: COLONOSCOPY, POSSIBLE BIOPSY, POSSIBLE POLYPECTOMY 16530;  Surgeon: Emre Aerllano MD;  Location: [facility]    Tonsillectomy                  Social History     Socioeconomic History    Marital status:    Tobacco Use    Smoking status: Former     Current packs/day: 0.00     Average packs/day: 0.5 packs/day for 2.0 years (1.0 ttl pk-yrs)     Types: Cigarettes     Start date: 1954     Quit date: 1956     Years since quittin.9    Smokeless tobacco: Never   Substance and Sexual Activity    Alcohol use: Yes     Alcohol/week: 0.0 standard drinks of alcohol     Comment: occasional    Drug use: No   Other Topics Concern    Caffeine Concern Yes                                Physical Exam     ED Triage Vitals [25]   /80   Pulse 71   Resp 16   Temp 98.7 °F (37.1 °C)   Temp src Temporal   SpO2 93 %   O2 Device None (Room air)       Current Vitals:   Vital Signs  BP: 125/80  Pulse: 71  Resp: 16  Temp: 98.7 °F (37.1 °C)  Temp src: Temporal    Oxygen Therapy  SpO2: 93 %  O2 Device: None (Room air)          Physical Exam  Constitutional: Oriented to person, place, and time.  Appears well-developed. No distress.   Head: Normocephalic and atraumatic.   Eyes: Conjunctivae are normal. Pupils are equal, round, and reactive to light.   Neck: Normal range of motion. Neck supple.   Cardiovascular: Normal rate, regular rhythm and intact distal pulses.    Pulmonary/Chest: Effort normal. No respiratory distress.   Abdominal: Soft. There is no tenderness.  There is no guarding.   Musculoskeletal: Normal range of motion. No edema or tenderness.   Neurological: Alert and oriented to person, place, and time.  Patient ambulated here slowly without gross focal deficits.  Skin: Skin is warm and dry.   Psychiatric: Normal mood and affect.  Behavior is normal.   Nursing note and vitals reviewed.    Differential diagnosis includes dementia with behavioral disturbance and sundowning, UTI.          ED Course     Labs Reviewed   URINALYSIS WITH CULTURE REFLEX - Abnormal; Notable for the following components:       Result Value    Clarity Urine Turbid (*)     Nitrite Urine 2+ (*)     Leukocyte Esterase Urine 250 (*)     WBC Urine >50 (*)     RBC Urine 6-10 (*)     Bacteria Urine 3+ (*)     WBC Clump Present (*)     All other components within normal limits   URINE CULTURE, ROUTINE          Results                              MDM              Medical Decision Making  Patient looks clinically well.  Nonfocal exam with stable vitals.  No indication for more aggressive further workup or admission to hospital at this time.  Recommended Tylenol for any pain.  Continue prescribe indications.  Follow-up in [facility] with any worsening or change    Problems Addressed:  Acute cystitis without hematuria: acute illness or injury with systemic symptoms  Vascular dementia with behavioral disturbance (HCC): chronic illness or injury with exacerbation, progression, or side effects of treatment    Amount and/or Complexity of Data Reviewed  Labs: ordered. Decision-making details documented in ED Course.    Risk  OTC drugs.  Prescription drug management.  Decision regarding hospitalization.  Diagnosis or treatment significantly limited by social determinants of health.        Disposition and Plan     Clinical Impression:  1. Vascular dementia with behavioral disturbance (HCC)    2. Acute cystitis without hematuria         Disposition:  Discharge  5/14/2025 12:21 am    Follow-up:  Vick Ramos  MD EWA  [address] [place] [address]  [address]  [facility] [id number]  [phone]    Call      We recommend that you schedule follow up care with a primary care provider within the next three months to obtain basic health screening including reassessment of your blood pressure.      Medications Prescribed:  Current Discharge Medication List        START taking these medications    Details   cephALEXin 500 MG Oral Cap Take 1 capsule (500 mg total) by mouth 3 (three) times daily for 7 days.  Qty: 21 capsule, Refills: 0    Associated Diagnoses: Acute cystitis without hematuria                   Supplementary Documentation:                                                                  Cimetidine Pregnancy And Lactation Text: This medication is Pregnancy Category B and is considered safe during pregnancy. It is also excreted in breast milk and breast feeding isn't recommended.

## 2025-06-28 ENCOUNTER — HOSPITAL ENCOUNTER (EMERGENCY)
Facility: HOSPITAL | Age: OVER 89
Discharge: HOME | End: 2025-06-29
Attending: EMERGENCY MEDICINE
Payer: MEDICARE

## 2025-06-28 DIAGNOSIS — Z13.9 ENCOUNTER FOR MEDICAL SCREENING EXAMINATION: Primary | ICD-10-CM

## 2025-06-28 PROCEDURE — 99285 EMERGENCY DEPT VISIT HI MDM: CPT

## 2025-06-28 PROCEDURE — 99283 EMERGENCY DEPT VISIT LOW MDM: CPT

## 2025-06-29 ENCOUNTER — HOSPITAL ENCOUNTER (INPATIENT)
Facility: HOSPITAL | Age: OVER 89
LOS: 3 days | Discharge: HOSPICE | End: 2025-07-02
Attending: EMERGENCY MEDICINE | Admitting: INTERNAL MEDICINE

## 2025-06-29 ENCOUNTER — APPOINTMENT (OUTPATIENT)
Dept: GENERAL RADIOLOGY | Facility: HOSPITAL | Age: OVER 89
End: 2025-06-29
Attending: EMERGENCY MEDICINE

## 2025-06-29 VITALS
RESPIRATION RATE: 21 BRPM | HEART RATE: 98 BPM | OXYGEN SATURATION: 100 % | SYSTOLIC BLOOD PRESSURE: 103 MMHG | TEMPERATURE: 98 F | DIASTOLIC BLOOD PRESSURE: 64 MMHG

## 2025-06-29 PROBLEM — N17.9 AKI (ACUTE KIDNEY INJURY): Status: ACTIVE | Noted: 2025-01-01

## 2025-06-29 PROBLEM — R09.02 HYPOXIA: Status: ACTIVE | Noted: 2025-01-01

## 2025-06-29 PROBLEM — R40.4 TRANSIENT ALTERATION OF AWARENESS: Status: ACTIVE | Noted: 2025-06-29

## 2025-06-29 PROBLEM — E87.0 HYPERNATREMIA: Status: ACTIVE | Noted: 2025-01-01

## 2025-06-29 PROBLEM — R40.4 TRANSIENT ALTERATION OF AWARENESS: Status: ACTIVE | Noted: 2025-01-01

## 2025-06-29 PROBLEM — R09.02 HYPOXIA: Status: ACTIVE | Noted: 2025-06-29

## 2025-06-29 PROBLEM — E87.0 HYPERNATREMIA: Status: ACTIVE | Noted: 2025-06-29

## 2025-06-29 PROBLEM — N17.9 AKI (ACUTE KIDNEY INJURY): Status: ACTIVE | Noted: 2025-06-29

## 2025-06-29 NOTE — PROGRESS NOTES
Transferred from medical floor for hypotension    Patient is from [facility] where he was recently admitted from outside psychiatric facility this past week. He has reportedly had poor oral intake and been with ongoing confusion and agitation    On exam:  Agitated,restless   He is verbal but only oriented to self  He has tremors        6/29/2025     2:40 PM 6/29/2025     3:26 PM   Vitals History   BP 74/39 120/92   BP Location Left arm Left arm   Pulse 90 91   Resp 24 14   Temp 98.6 °F (37 °C)    SpO2 98 % 99 %      Lab Results   Component Value Date    WBC 12.4 06/29/2025    HGB 14.1 06/29/2025    HCT 46.0 06/29/2025    .0 06/29/2025    CREATSERUM 1.66 06/29/2025    BUN 49 06/29/2025     06/29/2025    K 4.8 06/29/2025     06/29/2025    CO2 27.0 06/29/2025     06/29/2025    CA 9.3 06/29/2025    TROPHS 21 06/29/2025     Dementia with agitation   Unsure of previous psychiatric diagnosis but has dementia with challenging behavioral aspects  Consult placed to psych     Hypotension,  resolved but will start IVF  Suspect hypovolemic   Hypernatremia suspect 2/2 poor intake  Renal consulted     UTI  Will start zosyn  Culture sent     Will review home meds and resume psych meds     Trista Dia NP  [facility]

## 2025-06-29 NOTE — PLAN OF CARE
Problem: Safety Risk - Non-Violent Restraints  Goal: Patient will remain free from self-harm  Description: INTERVENTIONS:  - Apply the least restrictive restraint to prevent harm  - Notify patient and family of reasons restraints applied  - Assess for any contributing factors to confusion (electrolyte disturbances, delirium, medications)  - Discontinue any unnecessary medical devices as soon as possible  - Assess the patient's physical comfort, circulation, skin condition, hydration, nutrition and elimination needs   - Reorient and redirection as needed  - Assess for the need to continue restraints  6/29/2025 1737 by Jm Hernandez, RN  Outcome: Progressing  6/29/2025 1737 by Jm Hernandez, RN  Outcome: Progressing

## 2025-06-29 NOTE — ACP (ADVANCE CARE PLANNING)
ADVANCE CARE PLANNING NOTE    Participants   Myself  Patient's son/NADEEN Hopkins on phone ([place]ives [place] of [place])  Son Wilton in room  Patient in room - unable to meaningfully participate in conversation    Brief summary of underlying PTA medical problems  Progressive advanced dementia w behavioral disturbance  Recent psychiatric hospitalization for behav disturbances/agitation  Malnutrition - recent 20lb wt loss  Urinary retention w pinzon  Progressive debility - can barely walk, bed/chair bound  Safety issues - with dementia/agitation, fall risk, tries to pull pinzon, tries to get out of bed.    Brief summary of acute medical problems in hospital  TME - acute mental status changes  Agitation/aggression  Severe dehydration with hypotension  Presumed UTI/urosepsis    Goals of care  Maintain independence & quality of life  Would not want to be maintained artificially if there was limited chance for meaningful recovery    Code status & pre-arrest interventions  In event of acute decompensation, CPR/ACLS or intubation would NOT be in line w/ [facility]  No CPR/DNI order placed  Trial of peripheral pressors WOULD be in line with [facility]  Central line/PICC would NOT be in line with [facility]  Would NOT want artificial nutrition    Plan of care  See H&P - treat UTI, electrolyte derangements, dehydration  Son would like to meet with hospice to learn more, consult placed. Of note, pt resides at [facility]      Advance care planning, first 30 mins F2F. This time was exclusive from time spent on encounter earlier today.     Bhupendra Scott MD   Internal Medicine - [facility]  [facility]

## 2025-06-29 NOTE — RESPIRATORY THERAPY NOTE
RESPIRATORY THERAPY RAPID RESPONSE NOTE    RRT called for respiratory distress? no- hypotension    Breathing pattern: Normal  Patient currently being seen by Respiratory Care? no    RT interventions necessary? no

## 2025-06-29 NOTE — ED QUICK NOTES
Per RN at [facility], pt recently returned from in psych facility on Thursday. Per RN, pt has since been very confused and lethargic since returning to [facility]. Pt has been refusing to eat.     Per RN pt normally a/o x 1 at baseline but can converse.

## 2025-06-29 NOTE — H&P
[facility]   INTERNAL MEDICINE HISTORY & PHYSICAL    Subjective:   CHIEF COMPLAINT   Hypotension    HISTORY OF THE PRESENT ILLNESS    Minimal hx obtained from pt, dementia. Hx from review of outside records in care everywhere & discussion with son Wilton at bedside with son/NADEEN Hopkins on phone    Hamzah FOSS Navid Leach. - 89 year old male presents with confusion, AMS, hypoxia.    Dementia, NH resident who was admitted to  psych for mgmt of psychosis & agitation returned to NH few days ago, has been sleepy, lethargic. O2 tested, unable to obtain accurate pulse ox reading so sent to hospital. O2 okay in ER, returned to SNF. Later this AM, continued confusion, low O2, sent back to hospital. Labs w BMP - Na 167,     In ED, afebrile, BP lower. CMP w Na 167, LORENZO. CMP w wbc 12.4. Ua c/f infection. D/w Dr. Greco, admitting for further care.    Shortly after arrival to floor, pt with hypotension - RRT called, pt transferred to ICU.    REVIEW OF SYSTEMS   Unable to obtain    PATIENT HISTORIES  PMHx:  He has a past medical history of Benign prostatic hypertrophy, Blepharitis (2005), Cataract (2002), Cataract (2002), Chalazion of left upper eyelid (6/16/2009), Diverticulitis (7/15), Floaters (2008), KIDNEY STONE (1977), MGD (meibomian gland dysfunction) (2008), Neurogenic bladder, Other and unspecified hyperlipidemia, OTHER DISEASES, and Pinguecula (2005).  PSHx:  He has a past surgical history that includes colonoscopy,diagnostic (8/14/09); appendectomy (1943); tonsillectomy (1940); knee replacement surgery (Right, 3/06); colonoscopy,diagnostic (10/2/15); colonoscopy,remlucho lopez,snare (N/A, 10/2/2015); patient withough preoperative order for iv antibiotic surgical site infection prophylaxis. (N/A, 10/2/2015); patient documented not to have experienced any of the following events (N/A, 10/2/2015); other surgical history; other surgical history; other surgical history (1976); other surgical history (7-6-11); other surgical  history (8-11-11); other surgical history (7/21/16); other surgical history (9/1/16); hernia surgery (8/27/09); and hernia surgery (12/08/2020).   FHx:  His family history includes Glaucoma in his mother.   SHx: He reports that he quit smoking about 69 years ago. His smoking use included cigarettes. He started smoking about 71 years ago. He has a 1 pack-year smoking history. He has never used smokeless tobacco. He reports current alcohol use. He reports that he does not use drugs.    Allergies: He has no known allergies.     Current Outpatient Medications   Medication Instructions    acetaminophen (TYLENOL EXTRA STRENGTH) 500 mg, Oral, Every 6 hours PRN    docusate sodium (COLACE) 100 mg, Oral, 2 times daily    OLANZapine (ZYPREXA ZYDIS) 5 mg, Oral, Every 8 hours PRN    polyethylene glycol (PEG 3350) (MIRALAX) 17 g, Oral, DAILY PRN    QUEtiapine (SEROQUEL) 25 mg, Oral, 3 times daily, Hold for sedation    tamsulosin (FLOMAX) 0.4 mg, Oral, Daily       Objective:    PHYSICAL EXAMINATION   Vitals: /66 (BP Location: Right arm)   Pulse 66   Temp 97.5 °F (36.4 °C) (Temporal)   Resp 17   Ht 5' 10\" (1.778 m)   Wt 145 lb (65.8 kg)   SpO2 93%   BMI 20.81 kg/m²   Gen: Agitated, frail, thin  Eyes: PERRLA, normal conjunctivae  ENMT: Dry mucous membranes, trachea midline, no pharyngeal erythema, no thyromegaly  CV: RRR, no M/R/G, no peripheral edema  Resp: CTAB, non-labored respirations, symmetric expansion  GI: Soft, NT, ND, + BS, no rebound, no guarding  MSK:  No C/C/E, normal active/passive ROM in C-spine, 5/5 strength in all extremities  Skin: No rashes, visible skin w/o worrisome lesions   Neuro: CN 2-12 grossly intact, sensation intact   Psych: A&Ox1, agitated, trying to get out of bed, can answer yes/no questions, somewhat conversant but thought process not linear    DATA REVIEW: LABORATORY VALUES & DIAGNOSTICS  Recent Labs   Lab 06/29/25  1123 06/29/25  1216 06/29/25  1659 06/29/25  1837 06/30/25  0517   NA  167*  --  165*  --  166*   K  --  4.8  --  3.7 4.0   *  --  131*  --  131*   CO2 27.0  --  21.0  --  25.0   BUN  --  49*  --  47* 33*   CREATSERUM 1.66*  --  1.44*  --  1.12   ANIONGAP 12  --  13  --  10   *  --  87  --  98   CA 9.3  --  8.8  --  8.1*   EGFRCR 39*  --  46*  --  62     Recent Labs   Lab 06/29/25  1123 06/30/25  0516   WBC 12.4* 11.7*   HGB 14.1 12.4*   HCT 46.0 41.9   .0 245.0   MCV 99.1 101.0*   MOPERCENT 7.4 6.0   EOPERCENT 1.0 2.2   BAPERCENT 0.4 0.3     Assessment & Plan:    Hamzah Shoemaker - 89 year old male w Dementia w behav disturb admitted 6/29/2025 for hypoxia, ams, agitation.    Hypoxia  - CXR w poss fluid overload however appears very dry on exam  - unclear if true hypoxia, SpO2 improved with obtaining from earlobe  - Monitor, supp O2 PRN    Sepsis 2/2 UTI  Chronic urinary retention  - AMS/TME, hypotensive 70/50s, WBC 12, known UTI  - Ua abnl, f/u ucx  - Exchange pinzon  - zosyn    Hypernatremia  - Likely 2/2 above /dehdration  - IVF, trend BMP closely  - Nephro consulted    Protein-calorie malnutrition  Dehydration  Swallowing difficulties - per fam  - Body mass index is 20.81 kg/m².   - RD consulted - see corresponding documentation for details  - Nutrition support/supplements  - SLP eval    Advanced dementia w behav disturbances  - Recent psych hospital stay for same  - psych consulted - appreciate recs    H/o DVT (POA)  - Continue PTA eliquis. Per med list on 5/5, ?age/wt/renal function -> 2.5? Will d/w pharmd    Advance care planning  - Please see separate ACP note from 06/30/25 for further details re: [facility]  - DNR, hospice consulted for informational mtg    Dispo  EDoD:  TBD. Plan for return to [facility]. Suspect 7/3 at earliest    F/u:   - PCP:  Jimbo Fowler MD    Available via epic secure chat or perfect serve 7A - 7P.    Bhupendra Scott MD   Internal Medicine - [facility]  [facility]

## 2025-06-29 NOTE — ED PROVIDER NOTES
Patient Seen in: [facility] Emergency Department    History     Chief Complaint   Patient presents with    Hypotension       HPI    89-year-old male presents ER for evaluation for hypotension as well as low O2 sats.  Patient was sent to the ER yesterday for low O2 sats but was found to have a normal oxygen saturation 100% and sent back to the nursing home.  Patient returns with decreased mental status as well.  Patient slightly hypotensive on arrival.  Patient also slightly hypoxic.  Patient arousable but drowsy    History reviewed. Past Medical History[1]    History reviewed. Past Surgical History[2]      Medications :  Prescriptions Prior to Admission[3]     Family History[4]    Smoking Status: Social Hx on file[5]    ROS  All pertinent positives for the review of systems are mentioned in the HPI  All other organ systems are reviewed and are negative.    Constitutional and vital signs reviewed.      Social History and Family History elements reviewed from today, pertinent positives to the presenting problem noted.    Physical Exam     ED Triage Vitals   BP 06/29/25 1119 125/78   Pulse 06/29/25 1119 86   Resp 06/29/25 1119 19   Temp 06/29/25 1120 97.3 °F (36.3 °C)   Temp src 06/29/25 1120 Axillary   SpO2 06/29/25 1119 100 %   O2 Device 06/29/25 1119 Non-rebreather mask       All measures to prevent infection transmission during my interaction with the patient were taken. The patient was already wearing a droplet mask on my arrival to the room. Personal protective equipment including droplet mask, eye protection, and gloves were worn throughout the duration of the exam.  Handwashing was performed prior to and after the exam.  Stethoscope and any equipment used during my examination was cleaned with super sani-cloth germicidal wipes following the exam.     Physical Exam  Vitals and nursing note reviewed.   Constitutional:       Appearance: He is well-developed.   HENT:      Head: Normocephalic and atraumatic.       Right Ear: External ear normal.      Left Ear: External ear normal.      Nose: Nose normal.   Eyes:      Conjunctiva/sclera: Conjunctivae normal.      Pupils: Pupils are equal, round, and reactive to light.   Cardiovascular:      Rate and Rhythm: Normal rate and regular rhythm.      Heart sounds: Normal heart sounds.   Pulmonary:      Effort: Pulmonary effort is normal.      Breath sounds: Normal breath sounds.   Abdominal:      General: Bowel sounds are normal.      Palpations: Abdomen is soft.   Musculoskeletal:         General: Normal range of motion.      Cervical back: Normal range of motion and neck supple.   Skin:     General: Skin is warm and dry.   Neurological:      General: No focal deficit present.      Mental Status: He is alert. Mental status is at baseline.      Deep Tendon Reflexes: Reflexes are normal and symmetric.   Psychiatric:         Behavior: Behavior normal.         Thought Content: Thought content normal.         Judgment: Judgment normal.         ED Course        Labs Reviewed   BASIC METABOLIC PANEL (8) - Abnormal; Notable for the following components:       Result Value    Glucose 112 (*)     Sodium 167 (*)     Chloride 128 (*)     Creatinine 1.66 (*)     eGFR-Cr 39 (*)     All other components within normal limits   CBC WITH DIFFERENTIAL WITH PLATELET - Abnormal; Notable for the following components:    WBC 12.4 (*)     MCHC 30.7 (*)     RDW-SD 50.1 (*)     Neutrophil Absolute Prelim 9.05 (*)     Neutrophil Absolute 9.05 (*)     All other components within normal limits   PRO BETA NATRIURETIC PEPTIDE - Abnormal; Notable for the following components:    Pro-Beta Natriuretic Peptide 453 (*)     All other components within normal limits   EXPANDED BLOOD GAS, ARTERIAL - Abnormal; Notable for the following components:    ABG pH 7.46 (*)     ABG PO2 197 (*)     Sodium Blood Gas 164 (*)     Methemoglobin 1.6 (*)     All other components within normal limits   TROPONIN I HIGH SENSITIVITY -  Normal   POCT GLUCOSE - Normal   URINALYSIS WITH CULTURE REFLEX   REDRAW BMP     EKG    Rate, intervals and axes as noted on EKG Report.  Rate: 75  Rhythm: Sinus Rhythm  Reading: No ST deviation, normal axis.             Imaging Results Available and Reviewed while in ED: XR CHEST AP PORTABLE  (CPT=71045)  Result Date: 6/29/2025  CONCLUSION: 1. Cardiac enlargement with secondary signs of slight fluid overload. 2. Slightly diminished lung volumes with minimal bibasilar atelectasis. Electronically Verified and Signed by Attending Radiologist: Paul Holland MD 6/29/2025 12:21 PM Workstation: [email or web address]    ED Medications Administered:   Medications   sodium chloride 0.9 % IV bolus 1,000 mL (1,000 mL Intravenous New Bag 6/29/25 1210)         MDM     Vitals:    06/29/25 1145 06/29/25 1212 06/29/25 1215 06/29/25 1230   BP: 107/85 (!) 75/52 100/61 105/53   Pulse: 65 105 71 73   Resp: 17 19 16 13   Temp:       TempSrc:       SpO2: 100%  100% 100%     *I personally reviewed and interpreted all ED vitals.  I also personally reviewed all labs and imaging if ordered    Pulse Ox: 100%, Room air, Normal     Monitor Interpretation:   normal sinus rhythm    Differential Diagnosis/ Diagnostic Considerations: Electrolyte abnormality, failure to thrive, hypoxemia, dehydration.    Medical Record Review: I personally reviewed available prior medical records for any recent pertinent discharge summaries, testing, and procedures and reviewed those reports.    Complicating Factors: The patient already has does not have any pertinent problems on file. to contribute to the complexity of this ED evaluation.    Medical Decision Making  89-year-old male presents ER for decreased mental status and hypotension.  Patient given IV fluids and mental status is improved.  Patient at baseline is alert and orient x 1.  Patient likely to be hyponatremic from hypovolemia from decreased p.o. intake.  Discussed with patient's PCP as well as duly  hospitalist.  Patient will be admitted for further evaluation.  Chest x-ray does show some mild congestion with a normal BNP.  Patient given 2 L of IV fluids.    Total Critical Care Time: 36 minutes including time spent examining and re-evaluating the patient, ordering and reviewing laboratory tests, documenting, reviewing previous records, obtaining information from the family, and speaking with consultants, admitting doctors, nurses and medics and excludes any time spent on procedures.      Problems Addressed:  Hypernatremia: acute illness or injury  Hypoxia: acute illness or injury  Transient alteration of awareness: acute illness or injury    Amount and/or Complexity of Data Reviewed  Independent Historian:      Details: Medical history obtained from EMS states that the patient sent to the ER for decreased mental status as well as hypotension and hypoxemia.  External Data Reviewed: notes.     Details: Previous ER visit reviewed.  Patient was sent home without any workup because O2 sats 100% on arrival to the ER.  Labs: ordered. Decision-making details documented in ED Course.  Radiology: ordered and independent interpretation performed. Decision-making details documented in ED Course.     Details: Chest x-ray reviewed by myself shows mild pulmonary congestion.  Without        Condition upon leaving the department: Stable    Disposition and Plan     Clinical Impression:  1. Transient alteration of awareness    2. Hypernatremia    3. Hypoxia        Disposition:  Admit    Follow-up:  No follow-up provider specified.    Medications Prescribed:  Current Discharge Medication List          Hospital Problems       Present on Admission  Date Reviewed: 10/20/2021          ICD-10-CM Noted POA    * (Principal) Transient alteration of awareness R40.4 6/29/2025 Unknown               [1]   Past Medical History:   Benign prostatic hypertrophy    Blepharitis    OU    Cataract    OD    Cataract    OS    Chalazion of left upper  eyelid    OS    Diverticulitis    Floaters    OS    KIDNEY STONE    MGD (meibomian gland dysfunction)    OU    Neurogenic bladder    self caths TID    Other and unspecified hyperlipidemia    OTHER DISEASES    Hypogonadism    Pinguecula    OU   [2]   Past Surgical History:  Procedure Laterality Date    Appendectomy  1943    Colonoscopy,diagnostic  8/14/09    5mm ascending and sigmoid adenomatous polyps, diverticulosis    Colonoscopy,diagnostic  10/2/15    sigmoid polyp, diverticulosis    Colonoscopy,remv lesn,snare N/A 10/2/2015    Procedure: COLONOSCOPY, POSSIBLE BIOPSY, POSSIBLE POLYPECTOMY 33328;  Surgeon: Emre Arellano MD;  Location: [facility]    Hernia surgery  8/27/09    Umbilical hernia [facility]    Hernia surgery  12/08/2020    [facility]    Knee replacement surgery Right 3/06    R TKR  Serafin Diamond    Other surgical history      knee surgery    Other surgical history      doris fx with repair    Other surgical history  1976    Fx R leg ORIF #2 arthotomy procedures    Other surgical history  7-6-11    cysto, dr melchor    Other surgical history  8-11-11    cysto, laser litho bladder stone, laser shaving of prostate Dr Melchor    Other surgical history  7/21/16    cystoscopy dr melchor     Other surgical history  9/1/16    interstim pne dr melchor    Patient documented not to have experienced any of the following events N/A 10/2/2015    Procedure: COLONOSCOPY, POSSIBLE BIOPSY, POSSIBLE POLYPECTOMY 99049;  Surgeon: Emre Arellano MD;  Location: [facility]    Patient withough preoperative order for iv antibiotic surgical site infection prophylaxis. N/A 10/2/2015    Procedure: COLONOSCOPY, POSSIBLE BIOPSY, POSSIBLE POLYPECTOMY 61338;  Surgeon: Emre Arellano MD;  Location: [facility]    Tonsillectomy  1940   [3] (Not in a hospital admission)   [4]   Family History  Problem Relation Age of Onset    Glaucoma Mother     Diabetes Neg         family h/o   [5]   Social  History  Socioeconomic History    Marital status:    Tobacco Use    Smoking status: Former     Current packs/day: 0.00     Average packs/day: 0.5 packs/day for 2.0 years (1.0 ttl pk-yrs)     Types: Cigarettes     Start date: 1954     Quit date: 1956     Years since quittin.1    Smokeless tobacco: Never   Substance and Sexual Activity    Alcohol use: Yes     Alcohol/week: 0.0 standard drinks of alcohol     Comment: occasional    Drug use: No   Other Topics Concern    Caffeine Concern Yes

## 2025-06-29 NOTE — ED INITIAL ASSESSMENT (HPI)
Pt to ED via [facility]. Pt from [facility]. Per [facility] staff pt hypoxic. Staff unable to obtain SpO2. Per EMS spo2 check on ear. Pt 100% on 4L NC.

## 2025-06-29 NOTE — ED PROVIDER NOTES
Patient Seen in: [facility] Emergency Department    History     Chief Complaint   Patient presents with    Other     hypoxia       HPI    Patient presents to the ED via EMS from his nursing care facility after staff there noted that he was possibly hypoxic per EMS.  They were unable to get a O2 saturation.  He was sent to the ED for evaluation.  Patient is oriented x 1 and provides no history.    History reviewed. Past Medical History[1]    History reviewed. Past Surgical History[2]      Medications :  Prescriptions Prior to Admission[3]     Family History[4]    Smoking Status: Social Hx on file[5]    Constitutional and vital signs reviewed.      Social History and Family History elements reviewed from today, pertinent positives to the presenting problem noted.    Physical Exam     ED Triage Vitals [06/28/25 2230]   /70   Pulse 61   Resp 16   Temp 98.1 °F (36.7 °C)   Temp src Temporal   SpO2 100 %   O2 Device None (Room air)       All measures to prevent infection transmission during my interaction with the patient were taken. Handwashing was performed prior to and after the exam.  Stethoscope and any equipment used during my examination was cleaned with super sani-cloth germicidal wipes following the exam.     Physical Exam  Constitutional:       Appearance: Normal appearance. He is not ill-appearing or toxic-appearing.   Cardiovascular:      Rate and Rhythm: Normal rate and regular rhythm.   Pulmonary:      Effort: Pulmonary effort is normal. No respiratory distress.   Neurological:      Mental Status: He is alert. Mental status is at baseline.   Psychiatric:         Mood and Affect: Mood normal.         Behavior: Behavior normal.         ED Course      Labs Reviewed - No data to display    As Interpreted by me    Imaging Results Available and Reviewed while in ED: No results found.  ED Medications Administered: Medications - No data to display      MDM     Vitals:    06/28/25 2230 06/28/25 2245  06/29/25 0030   BP: 101/70 104/62 103/64   Pulse: 61 71 98   Resp: 16 22 21   Temp: 98.1 °F (36.7 °C)     TempSrc: Temporal     SpO2: 100% 100% 100%     *I personally reviewed and interpreted all ED vitals.    Pulse Ox: 100%, Room air, Normal     Monitor Interpretation:   normal sinus rhythm as interpreted by me.  The cardiac monitor was ordered to monitor heart rate.    Differential Diagnosis/ Diagnostic Considerations: Hypoxia, other    Complicating Factors: The patient already has does not have any pertinent problems on file. to contribute to the complexity of this ED evaluation.    Medical Decision Making  Patient presents to the ED for possible hypoxia.  O2 saturations normal in the ED, 100% on room air.  No respiratory distress.  No other abnormalities noted.  Stable for discharge back to his care facility.    Problems Addressed:  Encounter for medical screening examination: acute illness or injury    Amount and/or Complexity of Data Reviewed  Independent Historian: EMS     Details: EMS provides history details        Condition upon leaving the department: Stable    Disposition and Plan     Clinical Impression:  1. Encounter for medical screening examination        Disposition:  Discharge    Follow-up:  Jimbo Ibrahim MD  [address] [place] [address]  [address] [place] [id number]  [phone]    Schedule an appointment as soon as possible for a visit in 3 day(s)        Medications Prescribed:  Discharge Medication List as of 6/29/2025 12:15 AM                           [1]   Past Medical History:   Benign prostatic hypertrophy    Blepharitis    OU    Cataract    OD    Cataract    OS    Chalazion of left upper eyelid    OS    Diverticulitis    Floaters    OS    KIDNEY STONE    MGD (meibomian gland dysfunction)    OU    Neurogenic bladder    self caths TID    Other and unspecified hyperlipidemia    OTHER DISEASES    Hypogonadism    Pinguecula    OU   [2]   Past Surgical History:  Procedure Laterality Date     Appendectomy  1943    Colonoscopy,diagnostic  09    5mm ascending and sigmoid adenomatous polyps, diverticulosis    Colonoscopy,diagnostic  10/2/15    sigmoid polyp, diverticulosis    Colonoscopy,remv lesn,snare N/A 10/2/2015    Procedure: COLONOSCOPY, POSSIBLE BIOPSY, POSSIBLE POLYPECTOMY 10801;  Surgeon: Emre Arellano MD;  Location: [facility]    Hernia surgery  09    Umbilical hernia [facility]    Hernia surgery  2020    [facility]    Knee replacement surgery Right 3/06    R TKR  Serafin Diamond    Other surgical history      knee surgery    Other surgical history      doris fx with repair    Other surgical history      Fx R leg ORIF #2 arthotomy procedures    Other surgical history  11    cysto, dr melchor    Other surgical history  11    cysto, laser litho bladder stone, laser shaving of prostate Dr Melchor    Other surgical history  16    cystoscopy dr melchor     Other surgical history  16    interstim pne dr melchor    Patient documented not to have experienced any of the following events N/A 10/2/2015    Procedure: COLONOSCOPY, POSSIBLE BIOPSY, POSSIBLE POLYPECTOMY 04748;  Surgeon: Emre Arellano MD;  Location: [facility]    Patient withough preoperative order for iv antibiotic surgical site infection prophylaxis. N/A 10/2/2015    Procedure: COLONOSCOPY, POSSIBLE BIOPSY, POSSIBLE POLYPECTOMY 51767;  Surgeon: Emre Arellano MD;  Location: [facility]    Tonsillectomy  1940   [3] (Not in a hospital admission)  [4]   Family History  Problem Relation Age of Onset    Glaucoma Mother     Diabetes Neg         family h/o   [5]   Social History  Socioeconomic History    Marital status:    Tobacco Use    Smoking status: Former     Current packs/day: 0.00     Average packs/day: 0.5 packs/day for 2.0 years (1.0 ttl pk-yrs)     Types: Cigarettes     Start date: 1954     Quit date: 1956     Years since quittin.1     Smokeless tobacco: Never   Substance and Sexual Activity    Alcohol use: Yes     Alcohol/week: 0.0 standard drinks of alcohol     Comment: occasional    Drug use: No   Other Topics Concern    Caffeine Concern Yes

## 2025-06-29 NOTE — SIGNIFICANT EVENT
RRT    *See RRT Documentation Record*    Reason the RRT was called: Hypotension  Assessment of patient leading up to RRT:  BP of 61/34 on Right arm and 74/39 on the Left arm   Interventions/Testing: EKG, cardiology and nephrology consult  Patient Outcome/Disposition: Transfer to CCU  Family Notified: yes  Name of family notified: NADEEN Lama

## 2025-06-29 NOTE — ED INITIAL ASSESSMENT (HPI)
Pt via EMS from [facility] for hypotension and ams. [facility] unaware of pt baseline. Per EMS, pt BP by NH staff 82/63, SPO2 79 % on RA. Pt arrives on NRB, grunting to pain.

## 2025-06-29 NOTE — PROGRESS NOTES
RRT called due to hypotension.     SBP in the 70s despite having 2L boluses in ER and via EMS earlier today.   PulseOx 80s on 4L NC    Rest of vitals reviewed    Admission labs reviewed - EKG reviewed  Na 167  ABG reviewed  CXR reviewed    Patient being transferred to CCU for further care.   ICU, Cardiology and Nephrology on consult.   Patient may benefit from IV lasix on account of suspected overload on CXR but will administer once the patient is in CCU.     [employer] sent to primary attending.    35 minutes critical care time spent.

## 2025-06-29 NOTE — CONSULTS
[facility]  part of [facility]    Report of Consultation    Hamzah Shoemaker . Patient Status:  Inpatient    1935 MRN [id number]   Location [facility] 2W/SW Attending Bhupendra Scott MD   Hosp Day # 0 PCP Jimbo Fowler MD     Date of Admission:  2025  Date of Consult:  2025   Reason for Consultation:   LORENZO/hypernatremia    History of Present Illness:   Patient is a 89 year old male who was admitted to the hospital for Transient alteration of awareness:    89 y/o Male with h/o bph, with h/o urinary retention with a pinzon, dementia was recently at [facility] and discharged to [facility]. He was sent to ER earlier today with hypotension and hypoxia. And some confusion. RRT was called on the floor for hypotension and given ivf and sent to ICU. S/p 2 L bolus and on 4 L o2  On repeat labs hypernatremic and lorenzo and we are consulted  Na 167  Cr 1.6 mg/dl  UA +    Past Medical History  Past Medical History[1]    Past Surgical History  Past Surgical History[2]    Family History  Family History[3]    Social History  Short Social Hx on File[4]    Current Medications:  Current Hospital Medications[5]  Prescriptions Prior to Admission[6]    Allergies  Allergies[7]    Review of Systems:   Unable to obtain  confused    Physical Exam:   BP (!) 120/92 (BP Location: Left arm)   Pulse 91   Temp 98.6 °F (37 °C) (Axillary)   Resp 14   SpO2 99%      Intake/Output Summary (Last 24 hours) at 2025 1624  Last data filed at 2025 1400  Gross per 24 hour   Intake 1000 ml   Output 200 ml   Net 800 ml     Wt Readings from Last 1 Encounters:   23 111 lb 8 oz (50.6 kg)       Exam  GENERAL: frail cachectic male, on 02  SKIN: no rashes  HEENT: no scleral icterus, moist mucus membranes  NECK: supple,no adenopathy,no bruits  LUNGS: clear to auscultation without crackles or wheezes  CARDIO: RRR without murmur  GI: good BS's,no masses, HSM or tenderness, soft, non-distended, no  audible bruits  EXTREMITIES: no cyanosis, clubbing or edema  NEURO: CN grossly intact, A+O x3  Access      Results:     Laboratory Data:  Recent Labs   Lab 06/29/25  1123   RBC 4.64   HGB 14.1   HCT 46.0   MCV 99.1   MCH 30.4   MCHC 30.7*   RDW 13.6   NEPRELIM 9.05*   WBC 12.4*   .0         Recent Labs   Lab 06/29/25  1123 06/29/25  1216   *  --    BUN  --  49*   CREATSERUM 1.66*  --    CA 9.3  --    *  --    K  --  4.8   *  --    CO2 27.0  --         Imaging:  XR CHEST AP PORTABLE  (CPT=71045)  Result Date: 6/29/2025  CONCLUSION: 1. Cardiac enlargement with secondary signs of slight fluid overload. 2. Slightly diminished lung volumes with minimal bibasilar atelectasis. Electronically Verified and Signed by Attending Radiologist: Paul Holland MD 6/29/2025 12:21 PM Workstation: [id number]          Impression/Plan:     Impression:  LORENZO likely prerenal vs hypoperfusion/hypotension related. Agree with IVF. Check urine sodium  Hypernatremia, sec to FW deficit. Add d5w once volume replete  BPH with a pinzon  Sepsis/ hypotension, cx and abx  UTI cx and abx    Plan:  IVF- d51/2ns  Urine sodium  Abxs  Will switch to d5w by jeffrey Villegas son at bedside and hospitalist    Thank you very much for allowing me to participate in the care of your patient.  If you have any questions, please do not hesitate to contact me.     Светлана Ford MD  6/29/2025         [1]   Past Medical History:   Benign prostatic hypertrophy    Blepharitis    OU    Cataract    OD    Cataract    OS    Chalazion of left upper eyelid    OS    Diverticulitis    Floaters    OS    KIDNEY STONE    MGD (meibomian gland dysfunction)    OU    Neurogenic bladder    self caths TID    Other and unspecified hyperlipidemia    OTHER DISEASES    Hypogonadism    Pinguecula    OU   [2]   Past Surgical History:  Procedure Laterality Date    Appendectomy  1943    Colonoscopy,diagnostic  8/14/09    5mm ascending and sigmoid adenomatous polyps,  diverticulosis    Colonoscopy,diagnostic  10/2/15    sigmoid polyp, diverticulosis    Colonoscopy,remv lesn,snare N/A 10/2/2015    Procedure: COLONOSCOPY, POSSIBLE BIOPSY, POSSIBLE POLYPECTOMY 86826;  Surgeon: Emre Arellano MD;  Location: [facility]    Hernia surgery  09    Umbilical hernia [facility] Dr [place]    Hernia surgery  2020    [facility]    Knee replacement surgery Right 3/06    R TKR  Serafin Diamond    Other surgical history      knee surgery    Other surgical history      doris fx with repair    Other surgical history      Fx R leg ORIF #2 arthotomy procedures    Other surgical history  11    cysto, dr melchor    Other surgical history  11    cysto, laser litho bladder stone, laser shaving of prostate Dr Melchor    Other surgical history  16    cystoscopy dr melchor     Other surgical history  16    interstim pne dr melchor    Patient documented not to have experienced any of the following events N/A 10/2/2015    Procedure: COLONOSCOPY, POSSIBLE BIOPSY, POSSIBLE POLYPECTOMY 06612;  Surgeon: Emre Arellano MD;  Location: [facility]    Patient withough preoperative order for iv antibiotic surgical site infection prophylaxis. N/A 10/2/2015    Procedure: COLONOSCOPY, POSSIBLE BIOPSY, POSSIBLE POLYPECTOMY 60918;  Surgeon: Emre Arellano MD;  Location: [facility]    Tonsillectomy  1940   [3]   Family History  Problem Relation Age of Onset    Glaucoma Mother     Diabetes Neg         family h/o   [4]   Social History  Socioeconomic History    Marital status:    Tobacco Use    Smoking status: Former     Current packs/day: 0.00     Average packs/day: 0.5 packs/day for 2.0 years (1.0 ttl pk-yrs)     Types: Cigarettes     Start date: 1954     Quit date: 1956     Years since quittin.1    Smokeless tobacco: Never   Substance and Sexual Activity    Alcohol use: Yes     Alcohol/week: 0.0 standard drinks of alcohol     Comment:  occasional    Drug use: No   Other Topics Concern    Caffeine Concern Yes     Social Drivers of Health     Food Insecurity: Low Risk  (2025)    Received from [facility] Food Security     Within the past 12 months, the food you bought just didn't last and you didn't have money to get more.: 3     Within the past 12 months, you worried that your food would run out before you got money to buy more.: 3   Transportation Needs: Not At Risk (2025)    Received from [facility] Transportation Needs     In the past 12 months, has lack of reliable transportation kept you from medical appointments, meetings, work or from getting things needed for daily living?: No   Stress: Stress Concern Present (2025)    Received from [facility]    [employer] - Occupational Stress Questionnaire     Feeling of Stress : To some extent   Housing Stability: Not At Risk (2025)    Received from [facility] Housing     What is your living situation today?: I have a steady place to live     Think about the place you live. Do you have problems with any of the following?: None of the above   [5]   Current Facility-Administered Medications   Medication Dose Route Frequency    dextrose 5%-sodium chloride 0.45% infusion   Intravenous Continuous    heparin (Porcine) 5000 UNIT/ML injection 5,000 Units  5,000 Units Subcutaneous Q8H BOBBY    QUEtiapine (SEROquel) tab 25 mg  25 mg Oral TID    tamsulosin (Flomax) cap 0.4 mg  0.4 mg Oral Daily    OLANZapine (ZyPREXA Zydis) disintegrating tab 5 mg  5 mg Oral Q8H PRN    piperacillin-tazobactam (Zosyn) 3.375 g in dextrose 5% 100 mL IVPB-ADDV  3.375 g Intravenous Q8H   [6]   Medications Prior to Admission   Medication Sig    [] cephALEXin 500 MG Oral Cap Take 1 capsule (500 mg total) by mouth 3 (three) times daily for 7 days.    QUEtiapine 25 MG Oral Tab Take 1 tablet (25 mg total) by mouth 3 (three) times daily. Hold for sedation     polyethylene glycol, PEG 3350, 17 g Oral Powd Pack Take 17 g by mouth daily as needed (constipation).    docusate sodium 100 MG Oral Cap Take 1 capsule (100 mg total) by mouth 2 (two) times daily.    OLANZapine 5 MG Oral Tablet Dispersible Take 1 tablet (5 mg total) by mouth every 8 (eight) hours as needed (Aggitation).    acetaminophen 500 MG Oral Tab Take 1 tablet (500 mg total) by mouth every 6 (six) hours as needed for Pain.    tamsulosin 0.4 MG Oral Cap Take 1 capsule (0.4 mg total) by mouth daily.   [7] No Known Allergies

## 2025-06-29 NOTE — ED QUICK NOTES
Orders for admission, patient is aware of plan and ready to go upstairs. Any questions, please call ED RN mohan at extension [phone].     Patient Covid vaccination status: Fully vaccinated     COVID Test Ordered in ED: None    COVID Suspicion at Admission: N/A    Running Infusions: Medication Infusions[1]     Mental Status/LOC at time of transport: a/o self    Other pertinent information:   CIWA score: N/A   NIH score:  N/A             [1]

## 2025-06-30 ENCOUNTER — APPOINTMENT (OUTPATIENT)
Dept: CV DIAGNOSTICS | Facility: HOSPITAL | Age: OVER 89
End: 2025-06-30
Attending: INTERNAL MEDICINE

## 2025-06-30 PROBLEM — F05 DELIRIUM SUPERIMPOSED ON DEMENTIA: Status: ACTIVE | Noted: 2023-07-06

## 2025-06-30 NOTE — CM/SW NOTE
06/30/25 1600   CM/ Referral Data   Referral Source    Reason for Referral Discharge planning   Informant Son   Medical Hx   Does patient have an established PCP? Yes  (Jimbo Ibrahim)   Significant Past Medical/Mental Health Hx Advanced dementia w behav disturbances, Protein-calorie malnutrition   Patient Info   Patient's Current Mental Status at Time of Assessment Confused or unable to complete assessment   Patient's Home Environment Long Term Care Facility   Post Acute Care Provider Upon Admission PP SNF   Patient Status Prior to Admission   Independent with ADLs and Mobility Yes   Services in place prior to admission DME/Supplies at home   Type of DME/Supplies Straight Cane   Discharge Needs   Anticipated D/C needs To be determined     CM received MDO for discharge planning, hospice and POLST.     CM prefilled top portion of form and placed in the chart. CM notified MD and RN POLST form is available for completion and signature. Hospice referral sent to [facility] via [facility]. Waiting on hospice to schedule informational meeting with family.     CM contacted patient's son Sandeep. Confirmed patient [facility]. Prior to this, he lived in independent living for approximately 12-15 years. Sandeep reported his father was ambulatory with a cane. No other DME.     CM sent return referral to [facility] via AIDIN. Waiting on response.    DC PLAN: TBD    / to remain available for support and/or discharge planning     Teresa ACSAS RN   Nurse    [phone]

## 2025-06-30 NOTE — PROGRESS NOTES
[facility]   INTERNAL MEDICINE PROGRESS NOTE  Subjective:   CHIEF COMPLAINT   Hypotension    SUBJECTIVE  24 HR EVENTS   Hypotensive overnight, did require peripheral levophed   Na essentially unchanged - still on D5 1/2NS.  Remains confused. More calm this AM but still agitated.    INPATIENT MEDICATIONS  Scheduled Meds[1]  IV Meds[2]  PRN Meds[3]   Objective:    PHYSICAL EXAMINATION   Vitals: /66 (BP Location: Right arm)   Pulse 66   Temp 97.5 °F (36.4 °C) (Temporal)   Resp 17   Ht 5' 10\" (1.778 m)   Wt 145 lb (65.8 kg)   SpO2 93%   BMI 20.81 kg/m²   Gen: Agitated, frail, thin  Eyes: PERRLA, normal conjunctivae  ENMT: Dry mucous membranes, trachea midline  CV: RRR, no M/R/G, no peripheral edema  Resp: CTAB, non-labored respirations, symmetric expansion  GI: Soft, NT, ND, + BS, no rebound, no guarding  MSK:  No C/C/E, normal active/passive ROM in C-spine  Skin: No rashes, visible skin w/o worrisome lesions   Neuro: CN 2-12 grossly intact, sensation intact   Psych: A&Ox1, agitated, trying to get out of bed, can answer yes/no questions, somewhat conversant but thought process not linear    DATA REVIEW: LABORATORY VALUES & DIAGNOSTICS  Recent Labs   Lab 06/29/25  1123 06/29/25  1216 06/29/25  1659 06/29/25  1837 06/30/25  0517   *  --  165*  --  166*   K  --  4.8  --  3.7 4.0   *  --  131*  --  131*   CO2 27.0  --  21.0  --  25.0   BUN  --  49*  --  47* 33*   CREATSERUM 1.66*  --  1.44*  --  1.12   ANIONGAP 12  --  13  --  10   *  --  87  --  98   CA 9.3  --  8.8  --  8.1*   EGFRCR 39*  --  46*  --  62     Recent Labs   Lab 06/29/25  1123 06/30/25  0516   WBC 12.4* 11.7*   HGB 14.1 12.4*   HCT 46.0 41.9   .0 245.0   MCV 99.1 101.0*   MOPERCENT 7.4 6.0   EOPERCENT 1.0 2.2   BAPERCENT 0.4 0.3     Assessment & Plan:    Hamzah Shoemaker - 89 year old male w Dementia w behav disturb admitted 6/29/2025 for hypoxia, ams, agitation, hypotension. Found to have severe hypotension  presumably from profound dehydration with Na 167 & UTI. Required IV pressors in ICU. Started on IV abx. Nephro consulted - fluid mgmt.    Hypoxia  - CXR w poss fluid overload however appears very dry on exam  - unclear if true hypoxia, SpO2 improved with obtaining from earlobe  - Monitor, supp O2 PRN    Sepsis 2/2 UTI  Chronic urinary retention  - AMS/TME, hypotensive 70/50s, WBC 12, known UTI  - Ua abnl, f/u ucx  - Exchange pinzon  - zosyn (6/29 -  )    Hypernatremia  - Likely 2/2 above /dehdration  - IVF, trend BMP closely  - Nephro consulted    Hypotension  shock  - Suspect hypovolemic shock 2/2 dehydration  - Fluids per nephro  - Trial of peripheral levo (per GoC discussions, would NOT want central/art lines    Protein-calorie malnutrition  Dehydration  Swallowing difficulties - per fam  - Body mass index is 20.81 kg/m².   - RD consulted - see corresponding documentation for details  - Nutrition support/supplements  - SLP eval    Advanced dementia w behav disturbances  - Recent psych hospital stay for same  - psych consulted - appreciate recs    H/o DVT (POA)  - Continue PTA eliquis. Per med list on 5/5, ?age/wt/renal function -> 2.5? Will d/w pharmd    Advance care planning  - Please see separate ACP note from 06/30/25 for further details re: [facility]  - DNR, hospice consulted for informational mtg  Would NOT want central line/arterial line    Dispo  EDoD:  TBD. Plan for return to [facility]. Suspect 7/3 at earliest  Hospice referral for informational mtg     35 minutes of critical care time 6/30/2025. Time spent on this encounter was for management of hypotension, shock, electrolyte derangements, AMS. Patient w/ multi-organ dysfunction & high risk for further decline. Ongoing [facility]    F/u:   - PCP:  Jimbo Fowler MD    Available via epic secure chat or perfect serve 7A - 7P.    Bhupendra Scott MD   Internal Medicine - [facility]  [facility]         [1] apixaban, 5 mg, BID  memantine, 10 mg,  Nightly  haloperidol, 1 mg, BID  docusate sodium, 100 mg, BID  mirtazapine, 15 mg, Nightly  tamsulosin, 0.4 mg, Daily  piperacillin-tazobactam, 3.375 g, Q8H  [2] norepinephrine, Last Rate: 2 mcg/min (06/30/25 0215)  dextrose 5%-sodium chloride 0.45%, Last Rate: 100 mL/hr at 06/30/25 0219  [3] polyethylene glycol (PEG 3350), 17 g, Daily PRN  acetaminophen, 500 mg, Q6H PRN  OLANZapine, 5 mg, Q8H PRN  haloperidol lactate, 2 mg, Q4H PRN  glucose, 15 g, Q15 Min PRN   Or  glucose, 15 g, Q15 Min PRN   Or  glucose-vitamin C, 4 tablet, Q15 Min PRN   Or  dextrose, 50 mL, Q15 Min PRN   Or  glucose, 30 g, Q15 Min PRN   Or  glucose, 30 g, Q15 Min PRN   Or  glucose-vitamin C, 8 tablet, Q15 Min PRN

## 2025-06-30 NOTE — HOSPICE RN NOTE
[facility] Received referral. Spoke with son Sandeep who is requesting a phone informational meeting tomorrow at 12pm CST. Thank you for this referral.    James Wesley RN BSN  [facility]  Transitional Nurse Liaison  [phone] / [phone] (after hours)

## 2025-06-30 NOTE — PROGRESS NOTES
[facility]  part of [facility]    Progress Note    Hamzah Shoemaker JrMahsa Patient Status:  Inpatient    1935 MRN [id number]   Location [facility] 2W/SW Attending Bhupendra Scott MD   Hosp Day # 1 PCP Jimbo Fowler MD       Subjective:   Hamzah Shoemaker Jr. is a(n) 89 year old male     ROS:     Constitutional: Does not say much  ENMT:  Negative for ear drainage, hearing loss and nasal drainage  Eyes:  Negative for eye discharge and vision loss  Cardiovascular:  Negative for chest pain, sob, irregular heartbeat/palpitations  Respiratory:  Negative for cough, dyspnea and wheezing  Gastrointestinal:  Negative for abdominal pain, constipation, decreased appetite, diarrhea and vomiting  Genitourinary: Junior in place  Endocrine:  Negative for abnormal sleep patterns, increased activity, polydipsia and polyphagia  Hema/Lymph:  Negative for easy bleeding and easy bruising  Integumentary:  Negative for pruritus and rash  Musculoskeletal:  Negative for bone/joint symptoms  Neurological:  Negative for gait disturbance  Psychiatric:  Negative for inappropriate interaction and psychiatric symptoms      Vitals:    25 1800   BP: 90/60   Pulse: 81   Resp: 14   Temp:            PHYSICAL EXAM:   Constitutional: appears cachectic chronically ill  Head/Face: normocephalic  Eyes/Vision: normal extraocular motion is intact  Nose/Mouth/Throat:mucous membranes are moist and no oral lesions are noted  Neck/Thyroid: neck is supple without adenopathy  Lymphatic: no abnormal cervical, supraclavicular adenopathy is noted  Respiratory:  lungs are clear to auscultation bilaterally, normal respiratory effort  Cardiovascular: regular rate and rhythm no murmurs, gallups, or rubs  Abdomen: soft, non-tender, non-distended, BS normal  Vascular: well perfused femoral, and pedal pulses normal  Skin/Hair: no unusual rashes present, no abnormal bruising noted  Back/Spine: no abnormalities noted  Musculoskeletal: full  ROM all extremities good strength  no deformities  Extremities: no edema, cyanosis  Neurological: Dementia    Results:     Laboratory Data:  Lab Results   Component Value Date    WBC 11.7 (H) 06/30/2025    HGB 12.4 (L) 06/30/2025    HCT 41.9 06/30/2025    .0 06/30/2025    CREATSERUM 1.15 06/30/2025    BUN 29 (H) 06/30/2025     (HH) 06/30/2025    K 3.4 (L) 06/30/2025     (H) 06/30/2025    CO2 26.0 06/30/2025     (H) 06/30/2025    CA 8.3 (L) 06/30/2025    ALB 3.2 (L) 07/03/2023    ALKPHO 58 07/03/2023    BILT 1.3 07/03/2023    TP 6.9 07/03/2023    AST 19 07/03/2023    ALT 20 07/03/2023    INR 1.22 (H) 07/03/2023    TSH 0.892 05/18/2020    LIP 23 07/03/2023    PSA 3.80 05/15/2019    MG 2.4 08/05/2023    PHOS 2.7 07/08/2023       Imaging:  [unfilled]   XR CHEST AP PORTABLE  (CPT=71045)  Result Date: 6/29/2025  CONCLUSION: 1. Cardiac enlargement with secondary signs of slight fluid overload. 2. Slightly diminished lung volumes with minimal bibasilar atelectasis. Electronically Verified and Signed by Attending Radiologist: Paul Holland MD 6/29/2025 12:21 PM Workstation: [id number]        ASSESSMENT/PLAN:   Assessment       #1 LORENZO resolved  #2 hypernatremia switch over to D5W 125/h  #3 low sugars giving some D10 at 25/h    To be evaluated for hospice             6/30/2025  Som Gallego MD

## 2025-06-30 NOTE — PLAN OF CARE
Problem: Safety Risk - Non-Violent Restraints  Goal: Patient will remain free from self-harm  Description: INTERVENTIONS:  - Apply the least restrictive restraint to prevent harm  - Notify patient and family of reasons restraints applied  - Assess for any contributing factors to confusion (electrolyte disturbances, delirium, medications)  - Discontinue any unnecessary medical devices as soon as possible  - Assess the patient's physical comfort, circulation, skin condition, hydration, nutrition and elimination needs   - Reorient and redirection as needed  - Assess for the need to continue restraints  Outcome: Progressing     Problem: Risk for Violence/Aggression  Goal: Absence of Violence/Aggression  Description: INTERVENTIONS:   - Identify precipitating factors for behavior   - Notify Charge RN/Provider   - Consider decreasing stimulation   - Consider distraction measures   - Consider discussion with provider regarding prn meds    - Consider URMILA (Moderate Risk only)   - Consider Code Support (High Risk only)   - Consider room safety checks   - Consider restraints  Outcome: Progressing     Problem: Patient Centered Care  Goal: Patient preferences are identified and integrated in the patient's plan of care  Description: Interventions:  - Provide timely, complete, and accurate information to patient/family  - Incorporate patient and family knowledge, values, beliefs, and cultural backgrounds into the planning and delivery of care  - Encourage patient/family to participate in care and decision-making at the level they choose  - Honor patient and family perspectives and choices  Outcome: Progressing     Problem: RESPIRATORY - ADULT  Goal: Achieves optimal ventilation and oxygenation  Description: INTERVENTIONS:  - Assess for changes in respiratory status  - Assess for changes in mentation and behavior  - Position to facilitate oxygenation and minimize respiratory effort  - Oxygen supplementation based on oxygen  saturation or ABGs  - Provide Smoking Cessation handout, if applicable  - Encourage broncho-pulmonary hygiene including cough, deep breathe, Incentive Spirometry  - Assess the need for suctioning and perform as needed  - Assess and instruct to report SOB or any respiratory difficulty  - Respiratory Therapy support as indicated  - Manage/alleviate anxiety  - Monitor for signs/symptoms of CO2 retention  Outcome: Progressing     Problem: NEUROLOGICAL - ADULT  Goal: Achieves stable or improved neurological status  Description: INTERVENTIONS  - Assess for and report changes in neurological status  - Initiate measures to prevent increased intracranial pressure  - Maintain blood pressure and fluid volume within ordered parameters to optimize cerebral perfusion and minimize risk of hemorrhage  - Monitor temperature, glucose, and sodium. Initiate appropriate interventions as ordered  Outcome: Progressing  Goal: Absence of seizures  Description: INTERVENTIONS  - Monitor for seizure activity  - Administer anti-seizure medications as ordered  - Monitor neurological status  Outcome: Progressing  Goal: Remains free of injury related to seizure activity  Description: INTERVENTIONS:  - Maintain airway, patient safety  and administer oxygen as ordered  - Monitor patient for seizure activity, document and report duration and description of seizure to MD/LIP  - If seizure occurs, turn patient to side and suction secretions as needed  - Reorient patient post seizure  - Seizure pads on all 4 side rails  - Instruct patient/family to notify RN of any seizure activity  - Instruct patient/family to call for assistance with activity based on assessment  Outcome: Progressing  Goal: Achieves maximal functionality and self care  Description: INTERVENTIONS  - Monitor swallowing and airway patency with patient fatigue and changes in neurological status  - Encourage and assist patient to increase activity and self care with guidance from PT/OT  -  Encourage visually impaired, hearing impaired and aphasic patients to use assistive/communication devices  Outcome: Progressing

## 2025-06-30 NOTE — CONSULTS
[facility]  part of [facility]    Report of Consultation    Hamzah Shoemaker Jr. Patient Status:  Inpatient    1935 MRN [id number]   Location [facility] 2W/SW Attending Bhupendra Scott MD   Hosp Day # 1 PCP Jimbo Fowler MD     Date of Admission:  2025  Date of Consult: 2025    Reason for Consultation:   Consults  Dehydration and confusion and hyponatremia  Mild hypoxia  Dementia  Low bp     HPI:     Chief Complaint   Patient presents with    Hypotension     HPI    89 year old male who with advanced dementia  Presented from nursing home with low blood pressure and increased confusion and reported low oxygen saturation  Chest x-ray was clear  Patient with significant hypernatremia and dehydration and possible UTI  No reported fever or abdominal pain or vomiting or diarrhea  Patient looks very dry and dehydrated  Confused and not providing any meaningful history          History   Past Medical History[1]  Past Surgical History[2]  Family History[3]  Social History:  Short Social Hx on File[4]  Allergies/Medications:   Allergies: Allergies[5]  Prescriptions Prior to Admission[6]    Review of Systems:     Unable to perform ROS      Physical Exam:   Vital Signs:   height is 5' 10\" (1.778 m) and weight is 150 lb 3.2 oz (68.1 kg). His temporal temperature is 97.2 °F (36.2 °C). His blood pressure is 90/73 and his pulse is 88. His respiration is 11 and oxygen saturation is 97%.   Physical Exam  Vitals and nursing note reviewed.   Constitutional:       General: He is not in acute distress.     Appearance: He is ill-appearing.   HENT:      Head: Atraumatic.      Mouth/Throat:      Mouth: Mucous membranes are dry.   Eyes:      General: No scleral icterus.  Cardiovascular:      Rate and Rhythm: Normal rate. Rhythm irregular.      Heart sounds: Murmur heard.      No gallop.   Pulmonary:      Effort: No respiratory distress.      Breath sounds: No stridor. No wheezing, rhonchi or  rales.   Abdominal:      General: Abdomen is flat. Bowel sounds are normal. There is no distension.      Palpations: Abdomen is soft.      Tenderness: There is no guarding.   Musculoskeletal:      Cervical back: Neck supple.      Right lower leg: No edema.      Left lower leg: No edema.   Skin:     General: Skin is dry.   Neurological:      General: No focal deficit present.      Comments: Confused         Results:     Lab Results   Component Value Date    WBC 11.7 (H) 06/30/2025    HGB 12.4 (L) 06/30/2025    HCT 41.9 06/30/2025    .0 06/30/2025    CREATSERUM 1.12 06/30/2025    BUN 33 (H) 06/30/2025     (HH) 06/30/2025    K 4.0 06/30/2025     (H) 06/30/2025    CO2 25.0 06/30/2025    GLU 98 06/30/2025    CA 8.1 (L) 06/30/2025    ALB 3.2 (L) 07/03/2023    ALKPHO 58 07/03/2023    BILT 1.3 07/03/2023    TP 6.9 07/03/2023    AST 19 07/03/2023    ALT 20 07/03/2023    INR 1.22 (H) 07/03/2023    TSH 0.892 05/18/2020    LIP 23 07/03/2023    PSA 3.80 05/15/2019    MG 2.4 08/05/2023    PHOS 2.7 07/08/2023    TROPHS 21 06/29/2025     XR CHEST AP PORTABLE  (CPT=71045)  Result Date: 6/29/2025  CONCLUSION: 1. Cardiac enlargement with secondary signs of slight fluid overload. 2. Slightly diminished lung volumes with minimal bibasilar atelectasis. Electronically Verified and Signed by Attending Radiologist: Paul Holland MD 6/29/2025 12:21 PM Workstation: [id number]    EKG 12 Lead  Result Date: 6/30/2025  Atrial fibrillation Nonspecific ST and T wave abnormality Abnormal ECG When compared with ECG of 29-JUN-2025 11:32, Atrial fibrillation has replaced Sinus rhythm Inverted T waves have replaced nonspecific T wave abnormality in Inferior leads T wave inversion now evident in Lateral leads QT has shortened    EKG  Result Date: 6/30/2025  Sinus rhythm with marked sinus arrythmia Nonspecific ST abnormality Prolonged QT interval or tu fusion, consider myocardial disease, electrolyte imbalance, or drug effects  Abnormal ECG When compared with ECG of 06-JUL-2023 17:48, Sinus rhythm has replaced Atrial fibrillation Criteria for Septal infarct are no longer Present      Impression:       1-dehydration with hypernatremia and acute kidney injury  Calculated water deficit 5.9 L  D5W IV to replace water deficit    2-possible UTI  Culture pending  Started on Zosyn    3-hypotension  Likely dehydration and less likely Sepsis  IV fluid and antibiotics  Started on low-dose of Levophed we will try to wean off    4-mild hypoxia  Patient on 2 L of oxygen with 100% O2 sat and chest x-ray was clear  Likely atelectasis and poor inspiration    5-advanced dementia  Further confusion related to dehydration and hypernatremia   Replace water deficit and supportive care    6-DVT prophylaxis  Patient on Eliquis    7-DNAR/selective      D/w staff             Moe Fragoso MD  6/30/2025         [1]   Past Medical History:   Benign prostatic hypertrophy    Blepharitis    OU    Cataract    OD    Cataract    OS    Chalazion of left upper eyelid    OS    Diverticulitis    Floaters    OS    KIDNEY STONE    MGD (meibomian gland dysfunction)    OU    Neurogenic bladder    self caths TID    Other and unspecified hyperlipidemia    OTHER DISEASES    Hypogonadism    Pinguecula    OU   [2]   Past Surgical History:  Procedure Laterality Date    Appendectomy  1943    Colonoscopy,diagnostic  8/14/09    5mm ascending and sigmoid adenomatous polyps, diverticulosis    Colonoscopy,diagnostic  10/2/15    sigmoid polyp, diverticulosis    Colonoscopy,remv lesn,snare N/A 10/2/2015    Procedure: COLONOSCOPY, POSSIBLE BIOPSY, POSSIBLE POLYPECTOMY 03119;  Surgeon: Emre Arellano MD;  [facility] [facility]    Hernia surgery  8/27/09    Umbilical hernia [facility] Dr [place]am    Hernia surgery  12/08/2020    [facility]    Knee replacement surgery Right 3/06    R TKR  Serafin Diamond    Other surgical history      knee surgery    Other surgical history      doris conner with  repair    Other surgical history      Fx R leg ORIF #2 arthotomy procedures    Other surgical history  11    cysto, dr melchor    Other surgical history  11    cysto, laser litho bladder stone, laser shaving of prostate Dr Melchor    Other surgical history  16    cystoscopy dr melchor     Other surgical history  16    interstim pne dr melchor    Patient documented not to have experienced any of the following events N/A 10/2/2015    Procedure: COLONOSCOPY, POSSIBLE BIOPSY, POSSIBLE POLYPECTOMY 61448;  Surgeon: Emre Arellano MD;  Location: [facility]    Patient withough preoperative order for iv antibiotic surgical site infection prophylaxis. N/A 10/2/2015    Procedure: COLONOSCOPY, POSSIBLE BIOPSY, POSSIBLE POLYPECTOMY 24534;  Surgeon: Emre Arellano MD;  Location: [facility]    Tonsillectomy     [3]   Family History  Problem Relation Age of Onset    Glaucoma Mother     Diabetes Neg         family h/o   [4]   Social History  Socioeconomic History    Marital status:    Tobacco Use    Smoking status: Former     Current packs/day: 0.00     Average packs/day: 0.5 packs/day for 2.0 years (1.0 ttl pk-yrs)     Types: Cigarettes     Start date: 1954     Quit date: 1956     Years since quittin.1    Smokeless tobacco: Never   Substance and Sexual Activity    Alcohol use: Yes     Alcohol/week: 0.0 standard drinks of alcohol     Comment: occasional    Drug use: No   Other Topics Concern    Caffeine Concern Yes     Social Drivers of Health     Food Insecurity: Low Risk  (2025)    Received from [facility] Food Security     Within the past 12 months, the food you bought just didn't last and you didn't have money to get more.: 3     Within the past 12 months, you worried that your food would run out before you got money to buy more.: 3   Transportation Needs: Not At Risk (2025)    Received from [facility] Transportation Needs      In the past 12 months, has lack of reliable transportation kept you from medical appointments, meetings, work or from getting things needed for daily living?: No   Stress: Stress Concern Present (2025)    [facility] - Occupational Stress Questionnaire     Feeling of Stress : To some extent   Housing Stability: Not At Risk (2025)    Received from [facility]     What is your living situation today?: I have a steady place to live     Think about the place you live. Do you have problems with any of the following?: None of the above   [5] No Known Allergies  [6]   Medications Prior to Admission   Medication Sig    apixaban 5 MG Oral Tab Take 1 tablet (5 mg total) by mouth 2 (two) times daily.    haloperidol 1 MG Oral Tab Take 1 tablet (1 mg total) by mouth 2 (two) times daily.    memantine 10 MG Oral Tab Take 1 tablet (10 mg total) by mouth nightly.    mirtazapine 15 MG Oral Tab Take 1 tablet (15 mg total) by mouth nightly.    [] cephALEXin 500 MG Oral Cap Take 1 capsule (500 mg total) by mouth 3 (three) times daily for 7 days.    QUEtiapine 25 MG Oral Tab Take 1 tablet (25 mg total) by mouth 3 (three) times daily. Hold for sedation    polyethylene glycol, PEG 3350, 17 g Oral Powd Pack Take 17 g by mouth daily as needed (constipation).    docusate sodium 100 MG Oral Cap Take 1 capsule (100 mg total) by mouth 2 (two) times daily.    OLANZapine 5 MG Oral Tablet Dispersible Take 1 tablet (5 mg total) by mouth every 8 (eight) hours as needed (Aggitation).    acetaminophen 500 MG Oral Tab Take 1 tablet (500 mg total) by mouth every 6 (six) hours as needed for Pain.    tamsulosin 0.4 MG Oral Cap Take 1 capsule (0.4 mg total) by mouth daily.

## 2025-06-30 NOTE — DIETARY NOTE
ADULT NUTRITION INITIAL ASSESSMENT    Pt is at high nutrition risk.  Pt meets moderate malnutrition criteria.      RECOMMENDATIONS TO MD:   RD ordered ONS to promote improved/adequate PO intake.   See Nutrition Intervention for diet and oral nutritional supplement (ONS) specifics     ADMITTING DIAGNOSIS:  Transient alteration of awareness [R40.4]  Hypernatremia [E87.0]  Hypoxia [R09.02]  PERTINENT PAST MEDICAL HISTORY: Past Medical History[1]    PATIENT STATUS:   Initial 06/30/25: Pt assessed due to MD consult for malnutrition eval. Pt presented to hospital from NH with lethargy, confusion, low O2 - hypotension, sepsis secondary to UTI, hypernatremia. Pt with extensive PMH as listed above including dementia and recent inpatient psych admission for management of psychosis and agitation. Per MD documentation pt with PCM, dehydration and swallowing issues. Transferred to CCU s/p RRT. S/p BSSE this AM - PO diet initiated. Pt required 1:1 feeds with poor appetite/intake per RN. GOC discussions on-going - hospice informational meeting tomorrow at 12. Per MD documentation pt would not want artificial nutrition. No family at bedside at time of visit - will obtain diet hx when available if appropriate.    FOOD/NUTRITION RELATED HISTORY:  Appetite: Poor  Intake: ~10% of lunch tray consumed per RN report  Intake Meeting Needs: No, but oral nutrition supplements (ONS) to maximize  Percent Meals Eaten (last 6 days)       None        Food Allergies: No Known Food Allergies (NKFA)  Cultural/Ethnic/Restorationist Preferences: Not Obtained    GASTROINTESTINAL: +BM small, brown smear x3 past 24 hrs, dysphagia, Swallow evaluation noted, and abdomen is soft, flat, with active bowel sounds  UO: 825 ml output over the past 24 hrs  Net I/Os: +1.7 L total this admission    MEDICATIONS: reviewed; Noted cardiac electrolyte replacement protocol ordered; IVF of D5W @ 125 ml/hr (3L, 150 g dextrose, 510 kcal); Levo stopped;   Medication  Infusions[2]  Scheduled Medications[3]    LABS: reviewed; noted hypernatremia, hypokalemia    Recent Labs     06/29/25  1659 06/29/25  1837 06/30/25  0517 06/30/25  1150   GLU 87  --  98 117*   BUN  --  47* 33* 29*   CREATSERUM 1.44*  --  1.12 1.15   CA 8.8  --  8.1* 8.3*   *  --  166* 165*   K  --  3.7 4.0 3.4*   *  --  131* 129*   CO2 21.0  --  25.0 26.0   OSMOCALC  --   --  349* 347*     WEIGHT HISTORY:  Patient Weight(s) for the past 336 hrs:   Weight   06/30/25 0900 68.1 kg (150 lb 3.2 oz)   06/29/25 1800 65.8 kg (145 lb)     Wt Readings from Last 10 Encounters:   06/30/25 68.1 kg (150 lb 3.2 oz)   08/02/23 50.6 kg (111 lb 8 oz)   07/18/23 57.3 kg (126 lb 6.4 oz)   04/30/23 59 kg (130 lb)   12/31/22 77.1 kg (170 lb)   03/25/22 65.8 kg (145 lb)   10/20/21 64 kg (141 lb)   03/30/21 63 kg (139 lb)   12/23/20 63.5 kg (140 lb)   12/01/20 62.1 kg (137 lb)     ANTHROPOMETRICS:  HT: 177.8 cm (5' 10\")  Wt Readings from Last 1 Encounters:   06/30/25 68.1 kg (150 lb 3.2 oz)   Last weight: Wt hx extremely variable ranging from 111 lbs to 170 lbs over the past 3 years.   Dosing Weight: 68.1 kg (150 lbs) - admission weight taken on 6/30/25, utilized for anthropometric calculations  BMI: Body mass index is 21.55 kg/m².  BMI CLASSIFICATION: <22 considered underweight for advanced age (>65)  IBW/lbs (Calculated) Male: 166 lbs           90% IBW  Usual Body Wt: Unknown - wt in EMR variable however noted MD documentation of recent 20 lb wt loss       NA% UBW    NUTRITION RELATED PHYSICAL FINDINGS:  - Nutrition Focused Physical Exam (NFPE): moderate depletion body fat orbital region and moderate depletion muscle mass temple region  - Fluid Accumulation: none . See RN documentation for details  - Skin Integrity: at risk and intact. See RN documentation for details    CRITERIA FOR MALNUTRITION DIAGNOSIS:  Criteria for non-severe malnutrition diagnosis: chronic illness related to body fat moderate depletion, muscle mass  moderate depletion.    NUTRITION DIAGNOSIS/PROBLEM:   Inadequate protein energy intake related to Decreased ability to consume sufficient energy in the setting of advanced dementia as evidenced by PCM, poor appetite/intake this admission.     NUTRITION INTERVENTION:     NUTRITION PRESCRIPTION:   Estimated Nutrition needs: --dosing wt of 68 kg - wt taken on 6/30/25  Calories: 8028-8041 calories/day (25-30 calories per kg Dosing wt)  Protein:  g protein/day (1.2-1.5 g protein/kg Dosing wt)  Fluid Needs: 1700 ml/day (25 ml/kg chronological age method)    - Diet:       Procedures    Regular/General diet Fluid Consistency: Nectar Thick / Mildly Thick Liquids; Texture Consistency: Pureed; Is Patient on Accuchecks? No; Is Patient on Suicide Precautions? No     - Nutrition Care Plan: Initiated ONS (oral nutritional supplements)  - ONS (Oral Nutrition Supplements)/Meals/Snacks: Magic Cup (290 calories/ 9 g protein each) TID Vanilla, Chocolate, or Mixed berry   - Vitamin and mineral supplements: none  - Feeding assistance: meal set up and feed  - Nutrition education: not appropriate at this time  - Coordination of nutrition care: collaboration with other providers and discussed in Care Rounds   - Discharge and transfer of nutrition care to new setting or provider: monitor plans    MONITOR AND EVALUATE/NUTRITION GOALS:  - Food and Nutrient Intake:      Monitor: adequacy of PO intake, tolerance of PO intake, adequacy of supplement intake, and tolerance of supplement intake  - Food and Nutrient Administration:      Monitor: goc/family wishes regarding nutrition  - Anthropometric Measurement:    Monitor weight  - Nutrition Goals:      PO and supplement greater than 75% of needs, minimize lean body mass loss, and maintain true wt within 5%    DIETITIAN FOLLOW UP: RD to follow and monitor nutrition status    Teresita Linton MS, RD, LDN, [facility]  [id number]         [1]   Past Medical History:   Benign prostatic hypertrophy     Blepharitis    OU    Cataract    OD    Cataract    OS    Chalazion of left upper eyelid    OS    Diverticulitis    Floaters    OS    KIDNEY STONE    MGD (meibomian gland dysfunction)    OU    Neurogenic bladder    self caths TID    Other and unspecified hyperlipidemia    OTHER DISEASES    Hypogonadism    Pinguecula    OU   [2]    norepinephrine Stopped (06/30/25 1126)    dextrose 125 mL/hr at 06/30/25 0945   [3]    apixaban  5 mg Oral BID    memantine  10 mg Oral Nightly    haloperidol  1 mg Oral BID    docusate sodium  100 mg Oral BID    mirtazapine  15 mg Oral Nightly    potassium chloride  40 mEq Intravenous Once    tamsulosin  0.4 mg Oral Daily    piperacillin-tazobactam  3.375 g Intravenous Q8H

## 2025-06-30 NOTE — PHYSICAL THERAPY NOTE
PHYSICAL THERAPY EVALUATION - INPATIENT     Room Number: 214/214-A  Evaluation Date: 6/30/2025  Type of Evaluation: Initial   Physician Order: PT Eval and Treat    Presenting Problem: Transient alteration in awareness, Hypoxia  Co-Morbidities : Vascular dementia, agitation, AMS, TURP  Reason for Therapy: Mobility Dysfunction and Discharge Planning    PHYSICAL THERAPY ASSESSMENT   Patient is a 89 year old male admitted 6/29/2025 for hypoxia, behavioral disturbance, PMH Dementia.  Prior to admission, patient's baseline is pt lives in LTC setting nursing home level of care with assist for all mobility and ADL's PMH dementia not ambulatory for several months.  Patient is currently functioning below baseline with bed mobility, transfers, maintaining seated position, standing prolonged periods, and performing household tasks.  Patient is requiring maximum assist as a result of the following impairments: decreased functional strength, decreased endurance/aerobic capacity, impaired standing / sitting balance, impaired coordination, decreased muscular endurance, cognitive deficits (confused / dementia per PMH), and medical status.  Physical Therapy will continue to follow for duration of hospitalization.    Patient will benefit from continued skilled PT Services return to long term care with restorative therapies. D/C skilled PT at this time. group home level of care per baseline.     PLAN DURING HOSPITALIZATION  Nursing Mobility Recommendation : Lift Equipment  PT Device Recommendation: Mechanical lift  PT Treatment Plan: Bed mobility, Body mechanics, Endurance, Energy conservation, Family education, Patient education, Neuromuscular re-educate, Range of motion, Transfer training, Balance training, Strengthening  Rehab Potential :  (D/C PT nursing home level of care LTC / dementia not ambulatory)  Frequency (Obs):  (D/C PT)     PHYSICAL THERAPY MEDICAL/SOCIAL HISTORY   History related to current admission: Hamzah Shoemaker Jr. is a  89 year old male with a history of dementia who presented to the ED 6/29 with hypotension, hypoxia, decreased mental status.  Triage vitals /78, pulse 86, respiratory rate 19, SPO2 100% on nonrebreather.  Lab work pertinent for proBNP 453, WBC 12.4, sodium 165, creatinine 1.4.  EKG was sinus rhythm with PACs and no ischemic changes.  Chest x-ray showed cardiomegaly with slight volume overload.  Nephrology consulted for hypernatremia, recommended IV fluids with D5 half-normal saline and urine electrolytes ordered checked, given altered mental status, psychiatry is also been consulted and recommending as needed Haldol for agitation.  Patient was admitted to the ICU, pulmonology is following and started on Zosyn for possible UTI and low-dose Levophed for hypotension.      Problem List  Principal Problem:    Transient alteration of awareness  Active Problems:    Delirium superimposed on dementia    Episodic mood disorder    Hypernatremia    Hypoxia    LORENZO (acute kidney injury)      HOME SITUATION  Type of Home: Skilled nursing facility (LTC)                        Lives With: Staff 24 hours        Patient Regularly Uses: Wheelchair, Hospital bed, Mechanical lift     Prior Level of [place]: Assist with all mobility and ADL's in a LTC setting staff assist shelter level of care with dementia.     SUBJECTIVE  I am not sure what I am doing.     PHYSICAL THERAPY EXAMINATION   OBJECTIVE  Precautions: Bed/chair alarm, Restraints  Fall Risk: High fall risk    WEIGHT BEARING RESTRICTION       PAIN ASSESSMENT  Rating: Unable to rate          COGNITION  Overall Cognitive Status:  Impaired  Confused disorientated behavior / dementia  RANGE OF MOTION AND STRENGTH ASSESSMENT  Upper extremity ROM and strength are within functional limits   Lower extremity ROM is within functional limits   Lower extremity strength is within functional limits 4/5    BALANCE  Static Sitting: Fair -  Dynamic Sitting: Poor +  Static Standing:  Dependent  Dynamic Standing: Dependent    ACTIVITY TOLERANCE  Pulse: 85  Heart Rate Source: Monitor  Resp: 25  BP: (!) 209/174  BP Location: Right arm  BP Method: Automatic  Patient Position: Sitting    O2 WALK       AM-PAC '6-Clicks' INPATIENT SHORT FORM - BASIC MOBILITY  How much difficulty does the patient currently have...  Patient Difficulty: Turning over in bed (including adjusting bedclothes, sheets and blankets)?: A Lot   Patient Difficulty: Sitting down on and standing up from a chair with arms (e.g., wheelchair, bedside commode, etc.): A Lot   Patient Difficulty: Moving from lying on back to sitting on the side of the bed?: A Lot   How much help from another person does the patient currently need...   Help from Another: Moving to and from a bed to a chair (including a wheelchair)?: Total   Help from Another: Need to walk in hospital room?: Total   Help from Another: Climbing 3-5 steps with a railing?: Total     AM-PAC Score:  Raw Score: 9   Approx Degree of Impairment: 81.38%   Standardized Score (AM-PAC Scale): 30.55   CMS Modifier (G-Code): CM    FUNCTIONAL ABILITY STATUS  Functional Mobility/Gait Assessment  Gait Assistance: Not tested  Rolling: moderate assist  Supine to Sit: moderate assist  Sit to Supine: dependent  Sit to Stand: dependent    Exercise/Education Provided:  Bed mobility  Body mechanics  Energy conservation  Functional activity tolerated  Posture  ROM  Strengthening  Lower therapeutic exercise:  Ankle pumps  Heel slides  Transfer training    Skilled Therapy Provided: Pt ed with bed mobility with mod a from supine to sit. Pt required min A to maintain midline sitting postures at EOB. PT unable to follow PT verbal cues d/t dementia with confusion with poor carryover with instruction. Pt assisted back to bed demonstrating elevated BP's with sitting 209/174 RN present for PT, LUIS ARMANDO antunez. Pt returned to supine with restraints place back in place for pt safety. Pt is on track to return to LTC  setting as medical progress allows. Pt is at a [facility] level of care with assist for all mobility and ADL's not appropriate for skilled PT. D/C PT at this time.     The patient's Approx Degree of Impairment: 81.38% has been calculated based on documentation in the [facility] '6 clicks' Inpatient Basic Mobility Short Form.  Research supports that patients with this level of impairment may benefit from return to LTC [facility] level of care.    Final disposition will be made by interdisciplinary medical team.    Patient End of Session: In bed, With  staff, Needs met, Call light within reach, RN aware of session/findings, All patient questions and concerns addressed, Hospital anti-slip socks, Alarm set, Restraints (Rn staff present during PT eval)    D/C PT [facility] level of care return to LTC as medical progress allows.     Patient Evaluation Complexity Level:  History Low - no personal factors and/or co-morbidities   Examination of body systems Low -  addressing 1-2 elements   Clinical Presentation Low- Stable   Clinical Decision Making  Low Complexity

## 2025-06-30 NOTE — OCCUPATIONAL THERAPY NOTE
]OCCUPATIONAL THERAPY EVALUATION - INPATIENT     Room Number: 214/214-A  Evaluation Date: 2025  Type of Evaluation: Initial  Presenting Problem: Transient Alteration of Awareness    Physician Order: IP Consult to Occupational Therapy  Reason for Therapy: ADL/IADL Dysfunction and Discharge Planning    OCCUPATIONAL THERAPY ASSESSMENT   Patient is a 89 year old male admitted 2025 for transient alteration of awareness.  Prior to admission, patient's baseline is from memory care at [facility]; ambulatory per CM.  Patient is currently functioning below baseline with mobility; likely near baseline for self care .  Patient is requiring up to Max a  as a result of the following impairments: cognition, safety, deconditioning, generalized weakness. Occupational Therapy will continue to follow for duration of hospitalization.    Patient will benefit from continued skilled OT Services return to long term care with restorative therapies.    PLAN DURING HOSPITALIZATION  OT Device Recommendations: None  OT Treatment Plan: ADL training, Functional transfer training, Cognitive reorientation, Patient/Family education, Patient/Family training, Compensatory technique education     OCCUPATIONAL THERAPY MEDICAL/SOCIAL HISTORY   Problem List   Principal Problem:    Transient alteration of awareness  Active Problems:    Hypernatremia    Hypoxia    LORENZO (acute kidney injury)    HOME SITUATION  Type of Home: Skilled nursing facility    SUBJECTIVE  Pt confused and mumbling     OCCUPATIONAL THERAPY EXAMINATION   OBJECTIVE  Precautions: Bed/chair alarm  Fall Risk: High fall risk    WEIGHT BEARING RESTRICTION     PAIN ASSESSMENT  Ratin    ACTIVITIES OF DAILY LIVING ASSESSMENT  AM-PAC ‘6-Clicks’ Inpatient Daily Activity Short Form  How much help from another person does the patient currently need…  -   Putting on and taking off regular lower body clothing?: A Lot  -   Bathing (including washing, rinsing, drying)?: A Lot  -   Toileting, which  includes using toilet, bedpan or urinal? : A Lot  -   Putting on and taking off regular upper body clothing?: A Lot  -   Taking care of personal grooming such as brushing teeth?: A Lot  -   Eating meals?: A Lot    AM-PAC Score:  Score: 12  Approx Degree of Impairment: 66.57%  Standardized Score (AM-PAC Scale): 30.6  CMS Modifier (G-Code): CL    FUNCTIONAL TRANSFER ASSESSMENT     BED MOBILITY  Rolling: Minimal Assist  Supine to Sit : Minimal Assist  Sit to Supine (OT): Minimal Assist  Scooting: Min A    BALANCE ASSESSMENT  Static Sitting: Minimal Assist    FUNCTIONAL ADL ASSESSMENT  Eating: Minimal Assist  Grooming Seated: Minimal Assist  Bathing Seated: Moderate Assist  UB Dressing Seated: Minimal Assist  LB Dressing Seated: Minimal Assist  Toileting Seated: Maximum Assist    THERAPEUTIC EXERCISE     Skilled Therapy Provided: Pt rcvd in bed; restraints at BL wrists; mumbling to himself; not following consistent commnads and confused; mobilizing within the bed when he is able (limited by restraints and bedrails for safety); provided Max A to sit on side of bed for safety, Min A for sitting balance as pt is impulsive; currently requiring assist for all ADLs 2/2 mentation; assist for basic mobility provided; decided pt not safe to be OOB 2/2 mentation and impulsivity; returned to bed; alarm set and pt placed back on restraints; with RN at end of session.     EDUCATION PROVIDED  Patient Education : Role of Occupational Therapy; Plan of Care; Discharge Recommendations; Functional Transfer Techniques; Weight Bear Status; Fall Prevention; Posture/Positioning; Energy Conservation  Patient's Response to Education: Demonstrates Poor Carry Over to Information; Does Not Demonstrate Skills Needed for Learning; Demonstrates Disinterest    The patient's Approx Degree of Impairment: 66.57% has been calculated based on documentation in the [facility] '6 clicks' Inpatient Daily Activity Short Form.  Research supports that patients with  this level of impairment may benefit from GR, however from LTC, so goal to return with restorative   Final disposition will be made by interdisciplinary medical team.    Patient End of Session: In bed, Needs met, With  staff, RN aware of session/findings, Hospital anti-slip socks, All patient questions and concerns addressed, Alarm set, Restraints    OT Goals  Patient self-stated goal is: no goals stated      Patient will complete functional t/f with Min A  Comment:     Patient will complete toilet transfer with Mion A   Comment:     Patient will complete self care task at sink level with Min A    Comment:     Comment:         Goals  on:   Frequency:1-2 x week (OT trial(    Patient Evaluation Complexity Level:   Occupational Profile/Medical History MODERATE - Expanded review of history including review of medical or therapy record   Specific performance deficits impacting engagement in ADL/IADL MODERATE  3 - 5 performance deficits   Client Assessment/Performance Deficits MODERATE - Comorbidities and min to mod modifications of tasks    Clinical Decision Making MODERATE - Analysis of occupational profile, detailed assessments, several treatment options    Overall Complexity MODERATE     OT Session Time: 15 minutes  Self-Care Home Management: 0 minutes  Therapeutic Activity: 15 minutes

## 2025-06-30 NOTE — CONSULTS
[facility]    PATIENT'S NAME: MOE REDDY JR.   ATTENDING PHYSICIAN: Bhupendra Scott MD   CONSULTING PHYSICIAN: David Sorensen MD   PATIENT ACCOUNT#:   [id number]    LOCATION:  [address] [place] [address]  MEDICAL RECORD #:   [id number]       YOB: 1935  ADMISSION DATE:       06/29/2025      CONSULT DATE:  06/29/2025    REPORT OF CONSULTATION      IDENTIFICATION:  The patient is a 89-year-old  white male currently living at [facility] and not working.    CHIEF COMPLAINT:  Altered mental status.    HISTORY OF PRESENT ILLNESS:  The patient was sent to our hospital late in the evening on 06/28 when the staff at [facility] thought he was hypoxic, partly because they could not get a pO2 on him.  He came here and was found to have a pO2 of 100%.  He was sent back to [facility] earlier today.  Later today, he came back here with a complaint of hypotension and altered mental status.  The additional information obtained at that time noted that he was recently back from an inpatient psychiatric facility where he went because of agitation due to his dementia.  The staff at [facility] reports that he has been confused and lethargic since returning to [facility].    During my interview with the patient, I asked him how he was doing and he said \"feeling as well as I can be feeling.\"  He mostly just stared and was not very responsive, and his voice was quiet.    PAST MEDICAL HISTORY:  BPH, diverticulitis, neurogenic bladder.     PAST PSYCHIATRIC HISTORY:  The patient had a recent psychiatric admission reportedly.     MEDICATIONS:  Heparin, Zosyn, Flomax, sodium chloride, Seroquel 25 mg t.i.d., olanzapine orally disintegrating tablet 5 mg p.r.n. agitation.    ALLERGIES:  No known drug allergies.    SOCIAL HISTORY:  He lives at [facility].    FAMILY HISTORY:  Unable to obtain from patient.    REVIEW OF SYSTEMS:  Reviewed in Epic.       MENTAL STATUS EXAMINATION:  The patient is an elderly white male in  hospital gown, lying flat in his hospital bed, with speech that is minimal and very quiet and poor eye contact.  He mostly stared ahead and was not very responsive to my questions.  I was unable to assess other portions of the mental status exam.      INITIAL ASSESSMENT:  This 89-year-old  white male is admitted to the hospital for altered mental status and hypotension.  [facility] reports that he was confused and lethargic since returning from a psychiatric admission which he went to because of the agitation due to his dementia.  He was evidently placed on Seroquel 25 mg t.i.d. which seems to have made him quite sleepy.     INITIAL DIAGNOSIS:    AXIS I:  Delirium; dementia by history.  AXIS II:  Deferred.  AXIS III:  BPH, hypernatremia, neurogenic bladder.  AXIS IV:  Severe, severe and chronic medical and psychiatric problems.  AXIS V:  Current 10, past year 40.    INITIAL TREATMENT RECOMMENDATIONS:  At this time, I believe we should stop his Seroquel and begin assessing and treating his agitation from scratch.  The FDA has recently approved Rexulti for the treatment of agitation in the face of dementia, and all of this medicine is expensive, it is unlikely that he would be as sedated and confused as he is currently.  I will also order haloperidol 2 mg IV push p.r.n. agitation if he refuses to take any oral medication.    Thank you for referring this patient to us.     Dictated By David Sorensen MD  d: 06/29/2025 20:14:59  t: 06/29/2025 20:37:00  Job [id number]  [facility]/    cc: Bhupendra Scott MD

## 2025-06-30 NOTE — PROGRESS NOTES
Patient is a 89 year old , , male with past medical history of dementia, BPH, diverticulitis, who was admitted to the hospital for Transient alteration of awareness: The patient has been demonstrating alternation in mood and cognition. Patient was seen by Dr. Sorensen for initial consult.     Consult Duration   The patient seen for over 50-minute, follow-up evaluation, over 50% counseling and coordinating care addressing  agitation, confusion.  Record reviewed, communication with attending, communication with RN and patient seen face to face evaluation.    History of Present Illness:     Communicating with the team, no issues reported. Patient more alert this morning. Patient was seen over the weekend by Dr. Sorensen and it was recommended that Seroquel is discontinued.     The patient received the following psychotropic medications: haldol 1 mg PO BID, namenda 10 mg, remeron 15 mg,       The patient seen today sitting in hospital bed. He presents calm. He is not demonstrating any restlessness or agitation.    He is alert to self.  He has difficulty answering questions and engaging in conversation due to confusion.  He notes that he is not in the hospital.     The patient has been demonstrating alternation in mood and cognition with episodes of  increased confusion, restlessness, agitation and response to internal stimuli. It was reported that patient was sedated with use of Seroquel. Seroquel discontinued and patient is more alert and makes some effort to cooperate with interview.    Medical History:   Past Medical History  Past Medical History[1]    Past Surgical History  Past Surgical History[2]    Family History  Family History[3]    Social History  Short Social Hx on File[4]        Current Medications:  Current Hospital Medications[5]  Prescriptions Prior to Admission[6]    Allergies  Allergies[7]    Review of Systems:   As by Admitting/Attending    Results:   Laboratory Data:  Lab Results    Component Value Date    WBC 11.7 (H) 06/30/2025    HGB 12.4 (L) 06/30/2025    HCT 41.9 06/30/2025    .0 06/30/2025    CREATSERUM 1.12 06/30/2025    BUN 33 (H) 06/30/2025     (HH) 06/30/2025    K 4.0 06/30/2025     (H) 06/30/2025    CO2 25.0 06/30/2025    GLU 98 06/30/2025    CA 8.1 (L) 06/30/2025    ALB 3.2 (L) 07/03/2023    ALKPHO 58 07/03/2023    TP 6.9 07/03/2023    AST 19 07/03/2023    ALT 20 07/03/2023    INR 1.22 (H) 07/03/2023    PTP 15.3 (H) 07/03/2023    TSH 0.892 05/18/2020    LIP 23 07/03/2023    PSA 3.80 05/15/2019    MG 2.4 08/05/2023    PHOS 2.7 07/08/2023         Imaging:  XR CHEST AP PORTABLE  (CPT=71045)  Result Date: 6/29/2025  CONCLUSION: 1. Cardiac enlargement with secondary signs of slight fluid overload. 2. Slightly diminished lung volumes with minimal bibasilar atelectasis. Electronically Verified and Signed by Attending Radiologist: Paul Holland MD 6/29/2025 12:21 PM Workstation: [facility]      Vital Signs:   Blood pressure 90/73, pulse 88, temperature 97.2 °F (36.2 °C), temperature source Temporal, resp. rate 11, height 5' 10\" (1.778 m), weight 68.1 kg (150 lb 3.2 oz), SpO2 97%.    Mental Status Exam:   Appearance: Stated age male, in hospital gown, laying down in hospital bed.  Psychomotor: Patient has been demonstrating some sedate.  Orientation: Alert and oriented to person. Patient confused  Gait: Not evaluated.  Attitude/Coorperation: limited cooperation due to confusion   Behavior: episodes of restlessness and agitation.  Speech: Regular rate and rhythm speech.  Mood: anxious  Affect: restricted  Thought process: Confused, disorganized  Thought content: No reports of  suicidal or homicidal ideation.  Perceptions: Patient has been demonstrating response to internal stimuli.  Concentration: Grossly impaired  Memory: Grossly impaired  Intellect: Average.  Judgment and Insight: Questionable.     Impression:       Delirium superimposed on dementia   Episodic mood  disorder    Transient alteration of awareness    Hypernatremia    Hypoxia    LORENZO (acute kidney injury)    Patient is a 89 year old , , male with past medical history of dementia, BPH, diverticulitis, who was admitted to the hospital for Transient alteration of awareness: The patient has been demonstrating alternation in mood and cognition. Patient was seen by Dr. Sorensen for initial consult.     The patient has been demonstrating alternation in mood and cognition with episodes of  increased confusion, restlessness, agitation and response to internal stimuli. It was reported that patient was sedated with use of Seroquel. Seroquel discontinued and patient is more alert and makes some effort to cooperate with interview.    Discussed risk and benefit, acknowledging the current symptom and severity.  At this point, I would recommend the following approach:     Focus on safety  Focus on education and support.  Focus on insight orientation helping the patient understand diagnosis and treatment plan.  Continue haldol 1 mg BID  Continue namenda 10 mg nightly  Continue Remeron 15 mg nightly  Utilize haldol 2 mg IV q 6 hours PRN  Processed with patient at length, the initiation of the above psychotropic medications I advised the patient of the risks, benefits, alternatives and potential side effects. The patient consents to administration of the medications and understands the right to refuse medications at any time. The patient verbalized understanding.   Coordinate plan with team    Orders This Visit:  Orders Placed This Encounter   Procedures    Basic Metabolic Panel (8)    CBC With Differential With Platelet    Troponin I (High Sensitivity)    Pro Beta Natriuretic Peptide    Expanded Arterial Blood Gas    Urinalysis with Culture Reflex    REDRAW BMP    Basic Metabolic Panel (8)    Basic Metabolic Panel (8)    CBC With Differential With Platelet    Sodium, Urine, Random    REDRAW BMP    CBC, Platelet; No  Differential    Basic Metabolic Panel (8)    Basic Metabolic Panel (8)    CBC With Differential With Platelet    Urine Culture, Routine    MRSA Screen by PCR    Blood Culture    Clostridium difficile(toxigenic)PCR    MRSA Screen by PCR       Meds This Visit:  Requested Prescriptions      No prescriptions requested or ordered in this encounter       Myesha CastellanosYINKA  6/30/2025    Note to Patient: The 21st Century Cures Act makes medical notes like these available to patients in the interest of transparency. However, be advised this is a medical document. It is intended as peer to peer communication. It is written in medical language and may contain abbreviations or verbiage that are unfamiliar. It may appear blunt or direct. Medical documents are intended to carry relevant information, facts as evident, and the clinical opinion of the practitioner. This note may have been transcribed using a voice dictation system. Voice recognition errors may occur. This should not be taken to alter the content or meaning of this note.           [1]   Past Medical History:   Benign prostatic hypertrophy    Blepharitis    OU    Cataract    OD    Cataract    OS    Chalazion of left upper eyelid    OS    Diverticulitis    Floaters    OS    KIDNEY STONE    MGD (meibomian gland dysfunction)    OU    Neurogenic bladder    self caths TID    Other and unspecified hyperlipidemia    OTHER DISEASES    Hypogonadism    Pinguecula    OU   [2]   Past Surgical History:  Procedure Laterality Date    Appendectomy  1943    Colonoscopy,diagnostic  8/14/09    5mm ascending and sigmoid adenomatous polyps, diverticulosis    Colonoscopy,diagnostic  10/2/15    sigmoid polyp, diverticulosis    Colonoscopy,remv lesn,snare N/A 10/2/2015    Procedure: COLONOSCOPY, POSSIBLE BIOPSY, POSSIBLE POLYPECTOMY 91923;  Surgeon: Emre Arellano MD;  [facility]    Hernia surgery  8/27/09    Umbilical hernia [facility] Dr [place]    Hernia surgery   2020    [facility]    Knee replacement surgery Right 3/06    R TKR  Serafin Diamond    Other surgical history      knee surgery    Other surgical history      doris fx with repair    Other surgical history      Fx R leg ORIF #2 arthotomy procedures    Other surgical history  11    cysto, dr melchor    Other surgical history  11    cysto, laser litho bladder stone, laser shaving of prostate Dr Melchor    Other surgical history  16    cystoscopy dr melchor     Other surgical history  16    interstim pne dr melchor    Patient documented not to have experienced any of the following events N/A 10/2/2015    Procedure: COLONOSCOPY, POSSIBLE BIOPSY, POSSIBLE POLYPECTOMY 22402;  Surgeon: Emre Arellano MD;  Location: [facility]    Patient withough preoperative order for iv antibiotic surgical site infection prophylaxis. N/A 10/2/2015    Procedure: COLONOSCOPY, POSSIBLE BIOPSY, POSSIBLE POLYPECTOMY 61738;  Surgeon: Emre Arellano MD;  Location: [facility]    Tonsillectomy  1940   [3]   Family History  Problem Relation Age of Onset    Glaucoma Mother     Diabetes Neg         family h/o   [4]   Social History  Socioeconomic History    Marital status:    Tobacco Use    Smoking status: Former     Current packs/day: 0.00     Average packs/day: 0.5 packs/day for 2.0 years (1.0 ttl pk-yrs)     Types: Cigarettes     Start date: 1954     Quit date: 1956     Years since quittin.1    Smokeless tobacco: Never   Substance and Sexual Activity    Alcohol use: Yes     Alcohol/week: 0.0 standard drinks of alcohol     Comment: occasional    Drug use: No   Other Topics Concern    Caffeine Concern Yes     Social Drivers of Health     Food Insecurity: Low Risk  (2025)    Received from [facility] Food Security     Within the past 12 months, the food you bought just didn't last and you didn't have money to get more.: 3     Within the past 12 months, you worried that  your food would run out before you got money to buy more.: 3   Transportation Needs: Not At Risk (6/13/2025)    Received from [facility] Transportation Needs     In the past 12 months, has lack of reliable transportation kept you from medical appointments, meetings, work or from getting things needed for daily living?: No   Stress: Stress Concern Present (6/13/2025)    Received from [facility] of Occupational Health - Occupational Stress Questionnaire     Feeling of Stress : To some extent   Housing Stability: Not At Risk (6/13/2025)    Received from [facility] Housing     What is your living situation today?: I have a steady place to live     Think about the place you live. Do you have problems with any of the following?: None of the above   [5]   Current Facility-Administered Medications   Medication Dose Route Frequency    norepinephrine (Levophed) 4 mg/250mL infusion premix  0.5-50 mcg/min Intravenous Continuous    apixaban (Eliquis) tab 5 mg  5 mg Oral BID    polyethylene glycol (PEG 3350) (Miralax) 17 g oral packet 17 g  17 g Oral Daily PRN    memantine (Namenda) tab 10 mg  10 mg Oral Nightly    haloperidol (Haldol) tab 1 mg  1 mg Oral BID    docusate sodium (Colace) cap 100 mg  100 mg Oral BID    mirtazapine (Remeron) tab 15 mg  15 mg Oral Nightly    acetaminophen (Tylenol Extra Strength) tab 500 mg  500 mg Oral Q6H PRN    dextrose 5% infusion   Intravenous Continuous    tamsulosin (Flomax) cap 0.4 mg  0.4 mg Oral Daily    OLANZapine (ZyPREXA Zydis) disintegrating tab 5 mg  5 mg Oral Q8H PRN    piperacillin-tazobactam (Zosyn) 3.375 g in dextrose 5% 100 mL IVPB-ADDV  3.375 g Intravenous Q8H    haloperidol lactate (Haldol) 5 MG/ML injection 2 mg  2 mg Intravenous Q4H PRN    glucose (Dex4) 15 GM/59ML oral liquid 15 g  15 g Oral Q15 Min PRN    Or    glucose (Glutose) 40% oral gel 15 g  15 g Oral Q15 Min PRN    Or    glucose-vitamin C (Dex-4) chewable tab 4 tablet  4 tablet  Oral Q15 Min PRN    Or    dextrose 50% injection 50 mL  50 mL Intravenous Q15 Min PRN    Or    glucose (Dex4) 15 GM/59ML oral liquid 30 g  30 g Oral Q15 Min PRN    Or    glucose (Glutose) 40% oral gel 30 g  30 g Oral Q15 Min PRN    Or    glucose-vitamin C (Dex-4) chewable tab 8 tablet  8 tablet Oral Q15 Min PRN   [6]   Medications Prior to Admission   Medication Sig    apixaban 5 MG Oral Tab Take 1 tablet (5 mg total) by mouth 2 (two) times daily.    haloperidol 1 MG Oral Tab Take 1 tablet (1 mg total) by mouth 2 (two) times daily.    memantine 10 MG Oral Tab Take 1 tablet (10 mg total) by mouth nightly.    mirtazapine 15 MG Oral Tab Take 1 tablet (15 mg total) by mouth nightly.    [] cephALEXin 500 MG Oral Cap Take 1 capsule (500 mg total) by mouth 3 (three) times daily for 7 days.    QUEtiapine 25 MG Oral Tab Take 1 tablet (25 mg total) by mouth 3 (three) times daily. Hold for sedation    polyethylene glycol, PEG 3350, 17 g Oral Powd Pack Take 17 g by mouth daily as needed (constipation).    docusate sodium 100 MG Oral Cap Take 1 capsule (100 mg total) by mouth 2 (two) times daily.    OLANZapine 5 MG Oral Tablet Dispersible Take 1 tablet (5 mg total) by mouth every 8 (eight) hours as needed (Aggitation).    acetaminophen 500 MG Oral Tab Take 1 tablet (500 mg total) by mouth every 6 (six) hours as needed for Pain.    tamsulosin 0.4 MG Oral Cap Take 1 capsule (0.4 mg total) by mouth daily.   [7] No Known Allergies

## 2025-06-30 NOTE — PLAN OF CARE
Problem: Safety Risk - Non-Violent Restraints  Goal: Patient will remain free from self-harm  Description: INTERVENTIONS:  - Apply the least restrictive restraint to prevent harm  - Notify patient and family of reasons restraints applied  - Assess for any contributing factors to confusion (electrolyte disturbances, delirium, medications)  - Discontinue any unnecessary medical devices as soon as possible  - Assess the patient's physical comfort, circulation, skin condition, hydration, nutrition and elimination needs   - Reorient and redirection as needed  - Assess for the need to continue restraints  Outcome: Progressing     Problem: Risk for Violence/Aggression  Goal: Absence of Violence/Aggression  Description: INTERVENTIONS:   - Identify precipitating factors for behavior   - Notify Charge RN/Provider   - Consider decreasing stimulation   - Consider distraction measures   - Consider discussion with provider regarding prn meds    - Consider URMILA (Moderate Risk only)   - Consider Code Support (High Risk only)   - Consider room safety checks   - Consider restraints  Outcome: Progressing     Problem: RESPIRATORY - ADULT  Goal: Achieves optimal ventilation and oxygenation  Description: INTERVENTIONS:  - Assess for changes in respiratory status  - Assess for changes in mentation and behavior  - Position to facilitate oxygenation and minimize respiratory effort  - Oxygen supplementation based on oxygen saturation or ABGs  - Provide Smoking Cessation handout, if applicable  - Encourage broncho-pulmonary hygiene including cough, deep breathe, Incentive Spirometry  - Assess the need for suctioning and perform as needed  - Assess and instruct to report SOB or any respiratory difficulty  - Respiratory Therapy support as indicated  - Manage/alleviate anxiety  - Monitor for signs/symptoms of CO2 retention  Outcome: Progressing     Problem: NEUROLOGICAL - ADULT  Goal: Achieves stable or improved neurological  status  Description: INTERVENTIONS  - Assess for and report changes in neurological status  - Initiate measures to prevent increased intracranial pressure  - Maintain blood pressure and fluid volume within ordered parameters to optimize cerebral perfusion and minimize risk of hemorrhage  - Monitor temperature, glucose, and sodium. Initiate appropriate interventions as ordered  Outcome: Progressing

## 2025-06-30 NOTE — BH PROGRESS NOTE
Chart reviewed, spoke to RN, interviewed pt, dictated consult #[id number].  Assess: dementia with delirium.  Rec: Stop Seroquel.   Consider Rexulti, which is unlikely to make pt sedated, as he is now.      We will follow with you.

## 2025-06-30 NOTE — SLP NOTE
ADULT SWALLOWING EVALUATION    ASSESSMENT    ASSESSMENT/OVERALL IMPRESSION:  PPE REQUIRED. THIS THERAPIST WORE GLOVES FOR DURATION OF EVALUATION. HANDS WASHED UPON ENTRANCE/EXIT.    Per MD H&P, \"Minimal hx obtained from pt, dementia. Hx from review of outside records in care everywhere & discussion with son Wilton at bedside with son/DAVIDJIM Hopkins on phone Hamzah Shoemaker Jr. - 89 year old male presents with confusion, AMS, hypoxia. Dementia, NH resident who was admitted to  psych for mgmt of psychosis & agitation returned to NH few days ago, has been sleepy, lethargic. O2 tested, unable to obtain accurate pulse ox reading so sent to hospital. O2 okay in ER, returned to SNF. Later this AM, continued confusion, low O2, sent back to hospital. Labs w BMP - Na 167, In ED, afebrile, BP lower. CMP w Na 167, LORENZO. CMP w wbc 12.4. Ua c/f infection. D/w Dr. Greco, admitting for further care. Shortly after arrival to floor, pt with hypotension - RRT called, pt transferred to ICU.\"    SLP BSSE orders received and acknowledged. A swallow evaluation warranted per \"family noting more issues swallowing/choking\". Pt afebrile with weak vocal quality, on 2L/Min, with oxygen saturation at 97%. Pt with hx of dysphagia at [facility], BSE 7/8/23 with recommendations for regular/thin liquids.   Pt positioned 90 degrees in bed, fluctuating alertness/participation/restraints. Pt with no complaints of pain. Unable to complete oral motor examination at this time. Pt presented with trials of puree and mildly thick liquids via tsp. Pt with reduced oral acceptance and bilabial seal across all trials. Pt with reduced bolus formation/propulsion. Pt's swallow response appears delayed with reduced hyolaryngeal elevation/excursion. No clinical signs of aspiration (e.g., immediate/delayed throat clear, immediate/delayed cough, wet vocal quality, increased O2 effort) observed across all trials. 6/29 CXR indicates \"1. Cardiac enlargement with secondary signs of  slight fluid overload. 2. Slightly diminished lung volumes with minimal bibasilar atelectasis\". Oxygen status remained stable t/o the entire evaluation.     At this time, pt presents with mild/moderate oral dysphagia and probable pharyngeal dysfunction. Recommend a puree diet and mildly thick liquids with strict adherence to safe swallowing compensatory strategies. Results and recommendations reviewed with RN, pt. Pt unable to v/u to all results/recommendations, no family present. Recommendations remain written on whiteboard. SLP collaborated with RN for MD diet orders.     PLAN: SLP to f/u x2-3 meal assessments, monitor imaging, and VFSS if clinically indicated       RECOMMENDATIONS   Diet Recommendations - Solids: Puree  Diet Recommendations - Liquids: Nectar thick liquids/ Mildly thick                    Compensatory Strategies Recommended: Liquids via spoon  Aspiration Precautions: Upright position, Slow rate, Small bites, Small sips, Cueing to swallow, No straw, 1:1 feeding  Medication Administration Recommendations: Crushed in puree  Treatment Plan/Recommendations: Aspiration precautions    HISTORY   MEDICAL HISTORY  Reason for Referral:  (family noting more issues swallowing/choking)    Problem List  Principal Problem:    Transient alteration of awareness  Active Problems:    Hypernatremia    Hypoxia    LORENZO (acute kidney injury)      Past Medical History  Past Medical History[1]    Prior Living Situation: Skilled nursing facility  Diet Prior to Admission: Unknown  Precautions: Restraints    Patient/Family Goals: unable to state    SWALLOWING HISTORY  Current Diet Consistency: NPO  Dysphagia History: see above; [facility] BSE 6/23/25 with recommendations for soft bite sized/thin liquids  Imaging Results: see above    SUBJECTIVE       OBJECTIVE   ORAL MOTOR EXAMINATION  Dentition: Natural  Symmetry: Unable to assess  Strength: Unable to assess     Range of Motion: Unable to assess  Rate of Motion: Unable to  assess    Voice Quality: Weak  Respiratory Status: Supplemental O2, Nasal cannula  Consistencies Trialed: Nectar thick liquids, Puree  Method of Presentation: Staff/Clinician assistance, Spoon  Patient Positioned: Upright, Midline    Oral Phase of Swallow: Impaired  Bolus Retrieval: Impaired  Bilabial Seal: Impaired  Bolus Formation: Impaired  Bolus Propulsion: Impaired     Retention: Impaired    Pharyngeal Phase of Swallow: Appears Impaired  Laryngeal Elevation Timing: Appears impaired  Laryngeal Elevation Strength: Appears impaired  Laryngeal Elevation Coordination: Appears impaired  (Please note: Silent aspiration cannot be evaluated clinically. Videofluoroscopic Swallow Study is required to rule-out silent aspiration.)    Esophageal Phase of Swallow: No complaints consistent with possible esophageal involvement  Comments: NA          GOALS  Goal #1 The patient will tolerate puree consistency and mildly thick liquids without overt signs or symptoms of aspiration with 100 % accuracy over 1-2 session(s).  In Progress   Goal #2 The patient/family/caregiver will demonstrate understanding and implementation of aspiration precautions and swallow strategies independently over 1-2 session(s).    In Progress   Goal #3 The patient will tolerate trial upgrade of soft/hard solid consistency and thin liquids without overt signs or symptoms of aspiration with 100 % accuracy over 1-2 session(s).  In Progress   Goal #4 The patient will utilize compensatory strategies as outlined by  BSSE (clinical evaluation) including Slow rate, Small bites, Small sips, No straws, Upright 90 degrees, Upright 90 degrees 30 mins after meal, Liquids via tsp amount only, Eliminate distractions, Feed patient with PRN assistance 100 % of the time across 2 sessions.    In Progress     FOLLOW UP  Treatment Plan/Recommendations: Aspiration precautions  Duration: 1 week  Follow Up Needed (Documentation Required): Yes  SLP Follow-up Date:  07/01/25    Thank you for your referral.   If you have any questions, please contact MIGEL Anna M.S. CCC-SLP  Speech Language Pathologist  Phone Number Ext. [phone]         [1]   Past Medical History:   Benign prostatic hypertrophy    Blepharitis    OU    Cataract    OD    Cataract    OS    Chalazion of left upper eyelid    OS    Diverticulitis    Floaters    OS    KIDNEY STONE    MGD (meibomian gland dysfunction)    OU    Neurogenic bladder    self caths TID    Other and unspecified hyperlipidemia    OTHER DISEASES    Hypogonadism    Pinguecula    OU

## 2025-06-30 NOTE — CONSULTS
Cardiology Consult Note    Hamzah Shoemaker Jr. Patient Status:  Inpatient    1935 MRN [id number]   Location [facility] 2W/SW Attending Bhupendra Scott MD   Hosp Day # 1 PCP Jimbo Fowler MD     [facility].  Hamzah Shoemaker Jr. is a 89 year old male with a history of dementia who presented to the ED  with hypotension, hypoxia, decreased mental status.  Triage vitals /78, pulse 86, respiratory rate 19, SPO2 100% on nonrebreather.  Lab work pertinent for proBNP 453, WBC 12.4, sodium 165, creatinine 1.4.  EKG was sinus rhythm with PACs and no ischemic changes.  Chest x-ray showed cardiomegaly with slight volume overload.  Nephrology consulted for hypernatremia, recommended IV fluids with D5 half-normal saline and urine electrolytes ordered checked, given altered mental status, psychiatry is also been consulted and recommending as needed Haldol for agitation.  Patient was admitted to the ICU, pulmonology is following and started on Zosyn for possible UTI and low-dose Levophed for hypotension.      Prior cardiac workup  Echocardiogram 2023  1. Left ventricle: The cavity size was normal. Wall thickness was normal.      Systolic function was normal. The estimated ejection fraction was 55-60%.      Left ventricular diastolic function parameters were normal.   2. Right ventricle: The cavity size was increased. Systolic pressure was      moderately increased. The RV pressure during systole is 53mm Hg.   3. Aortic valve: There was mild regurgitation.   4. Mitral valve: There was moderate regurgitation.   5. Tricuspid valve: There was moderate regurgitation.   6. Pericardium, extracardiac: There was no pericardial effusion.         --------------------------------------------------------------------------------------------------------------------------------  ROS 10 systems reviewed, pertinent findings above.  ROS    History:  Past Medical History[1]  Past Surgical History[2]  Family History[3]   reports  that he quit smoking about 69 years ago. His smoking use included cigarettes. He started smoking about 71 years ago. He has a 1 pack-year smoking history. He has never used smokeless tobacco. He reports current alcohol use. He reports that he does not use drugs.    Objective:   Temp: 97.2 °F (36.2 °C)  Pulse: 88  Resp: 11  BP: 90/73  FiO2 (%): 50 %    Intake/Output:     Intake/Output Summary (Last 24 hours) at 6/30/2025 0942  Last data filed at 6/30/2025 0924  Gross per 24 hour   Intake 2523.02 ml   Output 1075 ml   Net 1448.02 ml       Physical Exam:     General: Obtunded  HEENT: Normocephalic, anicteric sclera, neck supple.  Neck: No JVD, carotids 2+, no bruits.  Cardiac: Regular rate and rhythm. S1, S2 normal. No murmur, pericardial rub, S3.  Lungs: Clear without wheezes, rales, rhonchi or dullness.  Normal excursions and effort.  Abdomen: Soft, non-tender. BS-present.  Extremities: Without clubbing, cyanosis or edema.  Peripheral pulses are 2+.  Neurologic: Non-focal  Skin: Warm and dry.       Assessment:    Altered mental status  Possible UTI  Hypotension  Hypoxia  Hyponatremia  LORENZO  Advanced dementia  Abnormal EKG, sinus rhythm with frequent PACs      Plan:  89-year-old male with above history presenting with altered mental status and admitted for hypoxia, hypotension, hypernatremia and possible UTI.  Patient likely dehydrated, nephrology following and providing judicious IV fluids to correct hyponatremia and dehydration.  Currently on low-dose pressors for hypotension.  Recommend echocardiogram to assess LV function as patient is requiring IV fluids  Continue monitoring on telemetry, no therapy right now for atrial ectopy    Thank you for allowing me to take part in the care of Hamzah Shoemaker Jr.. Please call with any questions of concerns.      Level of care: C5    Dr. Moshe Hwang,   June 30, 2025  9:42 AM           [1]   Past Medical History:   Benign prostatic hypertrophy     Blepharitis    OU    Cataract    OD    Cataract    OS    Chalazion of left upper eyelid    OS    Diverticulitis    Floaters    OS    KIDNEY STONE    MGD (meibomian gland dysfunction)    OU    Neurogenic bladder    self caths TID    Other and unspecified hyperlipidemia    OTHER DISEASES    Hypogonadism    Pinguecula    OU   [2]   Past Surgical History:  Procedure Laterality Date    Appendectomy  1943    Colonoscopy,diagnostic  8/14/09    5mm ascending and sigmoid adenomatous polyps, diverticulosis    Colonoscopy,diagnostic  10/2/15    sigmoid polyp, diverticulosis    Colonoscopy,remv lesn,snare N/A 10/2/2015    Procedure: COLONOSCOPY, POSSIBLE BIOPSY, POSSIBLE POLYPECTOMY 69493;  Surgeon: Emre Arellano MD;  Location: [facility]    Hernia surgery  8/27/09    Umbilical hernia [facility] Dr Gresham    Hernia surgery  12/08/2020    [facility]    Knee replacement surgery Right 3/06    R TKR  Serafin Diamond    Other surgical history      knee surgery    Other surgical history      doris fx with repair    Other surgical history  1976    Fx R leg ORIF #2 arthotomy procedures    Other surgical history  7-6-11    cysto, dr melchor    Other surgical history  8-11-11    cysto, laser litho bladder stone, laser shaving of prostate Dr Melchor    Other surgical history  7/21/16    cystoscopy dr melchor     Other surgical history  9/1/16    interstim pne dr melchor    Patient documented not to have experienced any of the following events N/A 10/2/2015    Procedure: COLONOSCOPY, POSSIBLE BIOPSY, POSSIBLE POLYPECTOMY 06923;  Surgeon: Emre Arellano MD;  Location: [facility]    Patient withough preoperative order for iv antibiotic surgical site infection prophylaxis. N/A 10/2/2015    Procedure: COLONOSCOPY, POSSIBLE BIOPSY, POSSIBLE POLYPECTOMY 69005;  Surgeon: Emre Arellano MD;  Location: [facility]    Tonsillectomy  1940   [3]   Family History  Problem Relation Age of Onset    Glaucoma Mother      Diabetes Neg         family h/o      Niacinamide Counseling: I recommended taking niacin or niacinamide, also know as vitamin B3, twice daily. Recent evidence suggests that taking vitamin B3 (500 mg twice daily) can reduce the risk of actinic keratoses and non-melanoma skin cancers. Side effects of vitamin B3 include flushing and headache.

## 2025-07-01 ENCOUNTER — APPOINTMENT (OUTPATIENT)
Dept: CV DIAGNOSTICS | Facility: HOSPITAL | Age: OVER 89
End: 2025-07-01
Attending: INTERNAL MEDICINE

## 2025-07-01 NOTE — PROGRESS NOTES
[facility] Hospitalist Progress Note     CC: Hospital Follow up    PCP: Jimbo Fowler MD       Assessment/Plan:     Principal Problem:    Transient alteration of awareness  Active Problems:    Delirium superimposed on dementia    Episodic mood disorder    Hypernatremia    Hypoxia    LORENZO (acute kidney injury)      Hamzah Shoemaker - 89 year old male w Dementia w behav disturb admitted 6/29/2025 for hypoxia, ams, agitation, hypotension. Found to have severe hypotension presumably from profound dehydration with Na 167 & UTI. Required IV pressors in ICU. Started on IV abx. Nephro consulted - fluid mgmt.       All mental status secondary to toxic metabolic encephalopathy  - Likely as a result of underlying severe hypernatremia and sepsis secondary to UTI  - Patient is not arousable for me this morning  - Continue treating underlying metabolic disturbances with IV fluids    Hypoxia-intermittently on room air appears mostly resolved  -No clear signs of pneumonia    Sepsis 2/2 UTI  Chronic urinary retention  Hypotension/shock  -Ongoing hypotension continue IV fluids now currently on D5 and D10 total rate of 75 an hour -  -will increase D5 to 75 an hour  -Blood pressure remains on the low side  -Peripheral Levophed as needed  -Continue Junior was exchanged  - zosyn (6/29 -  )  -Prognosis overall guarded   - Patient critically ill with high risk of further decompensation and death    Hypernatremia-severe  - Likely 2/2 above /dehdration  - -Increase D5 to 75 an hour  -Also on D10 at 25 total rate of 100  -Nephrology service following    Hypotension  shock  - Suspect hypovolemic shock 2/2 dehydration  - Fluids per nephro  - Trial of peripheral levo (per [facility] discussions, would NOT want central/art lines     Protein-calorie malnutrition  Dehydration  Swallowing difficulties - per fam  -Advanced dementia with behavioral disturbances  -Patient is appropriate for hospice ongoing goals of care discussion plan for hospice  meeting today at noon  -Pending meeting may reach out to son to discuss further at this point I recommend hospice  - Body mass index is 20.81 kg/m².     H/o DVT (POA)  - Continue PTA eliquis. Per med list on 5/5,   Would suggest stopping if patient enrolled in hospice     Advance care planning  - Please see separate ACP note from 06/30/25 for further details re: [facility]  - DNR, hospice consulted for informational mtg plan today  Would NOT want central line/arterial line     Dispo  EDoD:  TBD. Plan [facility].  Suspect will qualify for inpatient hospice  Hospice referral for informational mtg today       Questions/concerns were discussed with patient and/or family by bedside.    Critical care time 35 minutes      Thank You,  David Henderson, DO    Hospitalist with [facility]     Subjective:     Nonverbal    OBJECTIVE:    Blood pressure (!) 83/68, pulse 65, temperature 97.3 °F (36.3 °C), temperature source Temporal, resp. rate 13, height 5' 10\" (1.778 m), weight 154 lb 9.6 oz (70.1 kg), SpO2 100%.    Temp:  [97.2 °F (36.2 °C)-97.6 °F (36.4 °C)] 97.3 °F (36.3 °C)  Pulse:  [57-89] 65  Resp:  [11-47] 13  BP: ()/() 83/68  SpO2:  [97 %-100 %] 100 %      Intake/Output:    Intake/Output Summary (Last 24 hours) at 7/1/2025 0722  Last data filed at 7/1/2025 0600  Gross per 24 hour   Intake 3158.6 ml   Output 1335 ml   Net 1823.6 ml       Last 3 Weights   07/01/25 0600 154 lb 9.6 oz (70.1 kg)   06/30/25 0900 150 lb 3.2 oz (68.1 kg)   06/29/25 1800 145 lb (65.8 kg)   08/02/23 1319 111 lb 8 oz (50.6 kg)   07/20/23 0019 117 lb 4.8 oz (53.2 kg)   07/19/23 2345 117 lb 4.8 oz (53.2 kg)   07/18/23 1039 126 lb 6.4 oz (57.3 kg)   07/03/23 1958 141 lb 8.6 oz (64.2 kg)   07/03/23 1357 145 lb (65.8 kg)       Exam   Gen: Nonverbal not arousable Posey vest in place wrist restraints in place.  Dry mucous membranes  Pulm: Lungs clear, normal respiratory effort  CV: Heart with regular rate and rhythm  Abd: Abdomen  soft, nontender, nondistended  Junior catheter in place      Data Review:       Labs:     Recent Labs   Lab 06/29/25  1123 06/30/25  0516 07/01/25  0608   RBC 4.64 4.15 3.81  3.81   HGB 14.1 12.4* 11.3*  11.3*   HCT 46.0 41.9 37.8*  37.8*   MCV 99.1 101.0* 99.2  99.2   MCH 30.4 29.9 29.7  29.7   MCHC 30.7* 29.6* 29.9*  29.9*   RDW 13.6 13.7 13.3  13.3   NEPRELIM 9.05* 8.62* 6.51   WBC 12.4* 11.7* 9.4  9.4   .0 245.0 195.0  195.0         Recent Labs   Lab 06/30/25  1810 06/30/25  2348 07/01/25  0608   * 101* 178*   BUN 26* 16 19   CREATSERUM 1.17 1.13 1.06   EGFRCR 59* 62 67   CA 8.2* 8.1* 8.1*   * 154* 155*   K 3.6 3.7 3.4*  3.4*   * 124* 120*   CO2 25.0 21.0 25.0       No results for input(s): \"ALT\", \"AST\", \"ALB\", \"AMYLASE\", \"LIPASE\", \"LDH\" in the last 168 hours.    Invalid input(s): \"ALPHOS\", \"TBIL\", \"DBIL\", \"TPROT\"      Imaging:  XR CHEST AP PORTABLE  (CPT=71045)  Result Date: 6/29/2025  CONCLUSION: 1. Cardiac enlargement with secondary signs of slight fluid overload. 2. Slightly diminished lung volumes with minimal bibasilar atelectasis. Electronically Verified and Signed by Attending Radiologist: Paul Holland MD 6/29/2025 12:21 PM Workstation: [id number]        Meds:     Scheduled Medications[1]  Medication Infusions[2]  PRN Medications[3]           [1]    apixaban  5 mg Oral BID    memantine  10 mg Oral Nightly    haloperidol  1 mg Oral BID    docusate sodium  100 mg Oral BID    mirtazapine  15 mg Oral Nightly    tamsulosin  0.4 mg Oral Daily    piperacillin-tazobactam  3.375 g Intravenous Q8H   [2]    norepinephrine Stopped (06/30/25 1126)    dextrose 50 mL/hr at 07/01/25 0023    dextrose 25 mL/hr at 06/30/25 1620   [3]   polyethylene glycol (PEG 3350)    acetaminophen    OLANZapine    haloperidol lactate    glucose **OR** glucose **OR** glucose-vitamin C **OR** dextrose **OR** glucose **OR** glucose **OR** glucose-vitamin C

## 2025-07-01 NOTE — PROGRESS NOTES
Patient is a 89 year old , , male with past medical history of dementia, BPH, diverticulitis, who was admitted to the hospital for Transient alteration of awareness: The patient has been demonstrating alternation in mood and cognition. Patient was seen by Dr. Sorensen for initial consult.     Consult Duration   The patient seen for over 50-minute, follow-up evaluation, over 50% counseling and coordinating care addressing  agitation, confusion.  Record reviewed, communication with attending, communication with RN and patient seen face to face evaluation.    History of Present Illness:     Communicating with the team,  patient continues to demonstrate episodes of increased anxiety, restlessness, agitation requiring use of restraints.    The patient received the following psychotropic medications: haldol 1 mg PO BID, namenda 10 mg, remeron 15 mg,     The patient seen today sitting in hospital bed.He presents restless and anxious this morning. He remains in bilateral soft wrist restraints.    He is alert to self.  He has difficulty answering questions and engaging in conversation due to confusion.    The patient has been demonstrating alternation in mood and cognition with episodes of  increased confusion, restlessness, agitation and response to internal stimuli. It was reported that patient was sedated with use of Seroquel. Seroquel discontinued and patient is more alert and makes some effort to cooperate with interview.    Medical History:   Past Medical History  Past Medical History[1]    Past Surgical History  Past Surgical History[2]    Family History  Family History[3]    Social History  Short Social Hx on File[4]        Current Medications:  Current Hospital Medications[5]  Prescriptions Prior to Admission[6]    Allergies  Allergies[7]    Review of Systems:   As by Admitting/Attending    Results:   Laboratory Data:  Lab Results   Component Value Date    WBC 9.4 07/01/2025    WBC 9.4 07/01/2025     HGB 11.3 (L) 07/01/2025    HGB 11.3 (L) 07/01/2025    HCT 37.8 (L) 07/01/2025    HCT 37.8 (L) 07/01/2025    .0 07/01/2025    .0 07/01/2025    CREATSERUM 1.06 07/01/2025    BUN 19 07/01/2025     (H) 07/01/2025    K 3.4 (L) 07/01/2025    K 3.4 (L) 07/01/2025     (H) 07/01/2025    CO2 25.0 07/01/2025     (H) 07/01/2025    CA 8.1 (L) 07/01/2025    ALB 3.2 (L) 07/03/2023    ALKPHO 58 07/03/2023    TP 6.9 07/03/2023    AST 19 07/03/2023    ALT 20 07/03/2023    INR 1.22 (H) 07/03/2023    PTP 15.3 (H) 07/03/2023    TSH 0.892 05/18/2020    LIP 23 07/03/2023    PSA 3.80 05/15/2019    MG 2.4 08/05/2023    PHOS 2.7 07/08/2023         Imaging:  XR CHEST AP PORTABLE  (CPT=71045)  Result Date: 6/29/2025  CONCLUSION: 1. Cardiac enlargement with secondary signs of slight fluid overload. 2. Slightly diminished lung volumes with minimal bibasilar atelectasis. Electronically Verified and Signed by Attending Radiologist: Paul Holland MD 6/29/2025 12:21 PM Workstation: [id number]      Vital Signs:   Blood pressure 90/58, pulse 73, temperature 97.3 °F (36.3 °C), temperature source Temporal, resp. rate 14, height 5' 10\" (1.778 m), weight 70.1 kg (154 lb 9.6 oz), SpO2 100%.    Mental Status Exam:   Appearance: Stated age male, in hospital gown, laying down in hospital bed.  Psychomotor: Patient has been demonstrating some sedate.  Orientation: Alert and oriented to person. Patient confused  Gait: Not evaluated.  Attitude/Coorperation: limited cooperation due to confusion   Behavior: episodes of restlessness and agitation.  Speech: Regular rate and rhythm speech.  Mood: anxious  Affect: restricted  Thought process: Confused, disorganized  Thought content: No reports of  suicidal or homicidal ideation.  Perceptions: Patient has been demonstrating response to internal stimuli.  Concentration: Grossly impaired  Memory: Grossly impaired  Intellect: Average.  Judgment and Insight: Questionable.     Impression:        Delirium superimposed on dementia   Episodic mood disorder    Transient alteration of awareness    Hypernatremia    Hypoxia    LORENZO (acute kidney injury)    Patient is a 89 year old , , male with past medical history of dementia, BPH, diverticulitis, who was admitted to the hospital for Transient alteration of awareness: The patient has been demonstrating alternation in mood and cognition. Patient was seen by Dr. Sorensen for initial consult.     The patient has been demonstrating alternation in mood and cognition with episodes of  increased confusion, restlessness, agitation and response to internal stimuli. It was reported that patient was sedated with use of Seroquel. Seroquel discontinued and patient is more alert and makes some effort to cooperate with interview.    7/1/2025:Patient continues to have episodes of increased anxiety, restlessness, agitation requiring use of restraints.    Discussed risk and benefit, acknowledging the current symptom and severity.  At this point, I would recommend the following approach:     Focus on safety  Focus on education and support.  Focus on insight orientation helping the patient understand diagnosis and treatment plan.  Continue haldol 1 mg BID  Continue namenda 10 mg nightly  Continue Remeron 15 mg nightly  Utilize haldol 2 mg IV q 6 hours PRN  Processed with patient at length, the initiation of the above psychotropic medications I advised the patient of the risks, benefits, alternatives and potential side effects. The patient consents to administration of the medications and understands the right to refuse medications at any time. The patient verbalized understanding.   Coordinate plan with team    Orders This Visit:  Orders Placed This Encounter   Procedures    Basic Metabolic Panel (8)    CBC With Differential With Platelet    Troponin I (High Sensitivity)    Pro Beta Natriuretic Peptide    Expanded Arterial Blood Gas    Urinalysis with Culture  Reflex    REDRAW BMP    Basic Metabolic Panel (8)    Basic Metabolic Panel (8)    CBC With Differential With Platelet    Sodium, Urine, Random    REDRAW BMP    CBC, Platelet; No Differential    Basic Metabolic Panel (8)    CBC With Differential With Platelet    Potassium    Potassium    Urine Culture, Routine    Blood Culture    Clostridium difficile(toxigenic)PCR    MRSA Screen by PCR       Meds This Visit:  Requested Prescriptions      No prescriptions requested or ordered in this encounter       Myesha SnyderYINKA weathers  7/1/2025    Note to Patient: The 21st Century Cures Act makes medical notes like these available to patients in the interest of transparency. However, be advised this is a medical document. It is intended as peer to peer communication. It is written in medical language and may contain abbreviations or verbiage that are unfamiliar. It may appear blunt or direct. Medical documents are intended to carry relevant information, facts as evident, and the clinical opinion of the practitioner. This note may have been transcribed using a voice dictation system. Voice recognition errors may occur. This should not be taken to alter the content or meaning of this note.           [1]   Past Medical History:   Benign prostatic hypertrophy    Blepharitis    OU    Cataract    OD    Cataract    OS    Chalazion of left upper eyelid    OS    Diverticulitis    Floaters    OS    KIDNEY STONE    MGD (meibomian gland dysfunction)    OU    Neurogenic bladder    self caths TID    Other and unspecified hyperlipidemia    OTHER DISEASES    Hypogonadism    Pinguecula    OU   [2]   Past Surgical History:  Procedure Laterality Date    Appendectomy  1943    Colonoscopy,diagnostic  8/14/09    5mm ascending and sigmoid adenomatous polyps, diverticulosis    Colonoscopy,diagnostic  10/2/15    sigmoid polyp, diverticulosis    Colonoscopy,remv lesn,snare N/A 10/2/2015    Procedure: COLONOSCOPY, POSSIBLE BIOPSY, POSSIBLE POLYPECTOMY 91459;   Surgeon: Emre Arellano MD;  Location: [facility]    Hernia surgery  09    Umbilical hernia [facility] Dr [place]    Hernia surgery  2020    [facility]    Knee replacement surgery Right 3/06    R TKR  Serafin Diamond    Other surgical history      knee surgery    Other surgical history      doris fx with repair    Other surgical history      Fx R leg ORIF #2 arthotomy procedures    Other surgical history  11    cysto, dr melchor    Other surgical history  11    cysto, laser litho bladder stone, laser shaving of prostate Dr Melchor    Other surgical history  16    cystoscopy dr melchor     Other surgical history  16    interstim pne dr melchor    Patient documented not to have experienced any of the following events N/A 10/2/2015    Procedure: COLONOSCOPY, POSSIBLE BIOPSY, POSSIBLE POLYPECTOMY 47195;  Surgeon: Emre rAellano MD;  Location: [facility]    Patient withough preoperative order for iv antibiotic surgical site infection prophylaxis. N/A 10/2/2015    Procedure: COLONOSCOPY, POSSIBLE BIOPSY, POSSIBLE POLYPECTOMY 59337;  Surgeon: Emre Arellano MD;  Location: [facility]    Tonsillectomy  1940   [3]   Family History  Problem Relation Age of Onset    Glaucoma Mother     Diabetes Neg         family h/o   [4]   Social History  Socioeconomic History    Marital status:    Tobacco Use    Smoking status: Former     Current packs/day: 0.00     Average packs/day: 0.5 packs/day for 2.0 years (1.0 ttl pk-yrs)     Types: Cigarettes     Start date: 1954     Quit date: 1956     Years since quittin.1    Smokeless tobacco: Never   Substance and Sexual Activity    Alcohol use: Yes     Alcohol/week: 0.0 standard drinks of alcohol     Comment: occasional    Drug use: No   Other Topics Concern    Caffeine Concern Yes     Social Drivers of Health     Food Insecurity: Low Risk  (2025)    [facility] Food [facility]      Within the past 12 months, the food you bought just didn't last and you didn't have money to get more.: 3     Within the past 12 months, you worried that your food would run out before you got money to buy more.: 3   Transportation Needs: Not At Risk (6/13/2025)    Received from [facility] Transportation Needs     In the past 12 months, has lack of reliable transportation kept you from medical appointments, meetings, work or from getting things needed for daily living?: No   Stress: Stress Concern Present (6/13/2025)    Received from [facility] - Occupational Stress Questionnaire     Feeling of Stress : To some extent   Housing Stability: Not At Risk (6/13/2025)    Received from [facility] Housing     What is your living situation today?: I have a steady place to live     Think about the place you live. Do you have problems with any of the following?: None of the above   [5]   Current Facility-Administered Medications   Medication Dose Route Frequency    potassium chloride 40 mEq in 250mL sodium chloride 0.9% IVPB premix  40 mEq Intravenous Once    norepinephrine (Levophed) 4 mg/250mL infusion premix  0.5-50 mcg/min Intravenous Continuous    apixaban (Eliquis) tab 5 mg  5 mg Oral BID    polyethylene glycol (PEG 3350) (Miralax) 17 g oral packet 17 g  17 g Oral Daily PRN    memantine (Namenda) tab 10 mg  10 mg Oral Nightly    haloperidol (Haldol) tab 1 mg  1 mg Oral BID    docusate sodium (Colace) cap 100 mg  100 mg Oral BID    mirtazapine (Remeron) tab 15 mg  15 mg Oral Nightly    acetaminophen (Tylenol Extra Strength) tab 500 mg  500 mg Oral Q6H PRN    dextrose 5% infusion   Intravenous Continuous    dextrose 10% infusion   Intravenous Continuous    tamsulosin (Flomax) cap 0.4 mg  0.4 mg Oral Daily    OLANZapine (ZyPREXA Zydis) disintegrating tab 5 mg  5 mg Oral Q8H PRN    piperacillin-tazobactam (Zosyn) 3.375 g in dextrose 5% 100 mL IVPB-ADDV  3.375 g  Intravenous Q8H    haloperidol lactate (Haldol) 5 MG/ML injection 2 mg  2 mg Intravenous Q4H PRN    glucose (Dex4) 15 GM/59ML oral liquid 15 g  15 g Oral Q15 Min PRN    Or    glucose (Glutose) 40% oral gel 15 g  15 g Oral Q15 Min PRN    Or    glucose-vitamin C (Dex-4) chewable tab 4 tablet  4 tablet Oral Q15 Min PRN    Or    dextrose 50% injection 50 mL  50 mL Intravenous Q15 Min PRN    Or    glucose (Dex4) 15 GM/59ML oral liquid 30 g  30 g Oral Q15 Min PRN    Or    glucose (Glutose) 40% oral gel 30 g  30 g Oral Q15 Min PRN    Or    glucose-vitamin C (Dex-4) chewable tab 8 tablet  8 tablet Oral Q15 Min PRN   [6]   Medications Prior to Admission   Medication Sig    apixaban 5 MG Oral Tab Take 1 tablet (5 mg total) by mouth 2 (two) times daily.    haloperidol 1 MG Oral Tab Take 1 tablet (1 mg total) by mouth 2 (two) times daily.    memantine 10 MG Oral Tab Take 1 tablet (10 mg total) by mouth nightly.    mirtazapine 15 MG Oral Tab Take 1 tablet (15 mg total) by mouth nightly.    [] cephALEXin 500 MG Oral Cap Take 1 capsule (500 mg total) by mouth 3 (three) times daily for 7 days.    QUEtiapine 25 MG Oral Tab Take 1 tablet (25 mg total) by mouth 3 (three) times daily. Hold for sedation    polyethylene glycol, PEG 3350, 17 g Oral Powd Pack Take 17 g by mouth daily as needed (constipation).    docusate sodium 100 MG Oral Cap Take 1 capsule (100 mg total) by mouth 2 (two) times daily.    OLANZapine 5 MG Oral Tablet Dispersible Take 1 tablet (5 mg total) by mouth every 8 (eight) hours as needed (Aggitation).    acetaminophen 500 MG Oral Tab Take 1 tablet (500 mg total) by mouth every 6 (six) hours as needed for Pain.    tamsulosin 0.4 MG Oral Cap Take 1 capsule (0.4 mg total) by mouth daily.   [7] No Known Allergies

## 2025-07-01 NOTE — PLAN OF CARE
Problem: Safety Risk - Non-Violent Restraints  Goal: Patient will remain free from self-harm  Description: INTERVENTIONS:  - Apply the least restrictive restraint to prevent harm  - Notify patient and family of reasons restraints applied  - Assess for any contributing factors to confusion (electrolyte disturbances, delirium, medications)  - Discontinue any unnecessary medical devices as soon as possible  - Assess the patient's physical comfort, circulation, skin condition, hydration, nutrition and elimination needs   - Reorient and redirection as needed  - Assess for the need to continue restraints  Outcome: Progressing     Problem: Risk for Violence/Aggression  Goal: Absence of Violence/Aggression  Description: INTERVENTIONS:   - Identify precipitating factors for behavior   - Notify Charge RN/Provider   - Consider decreasing stimulation   - Consider distraction measures   - Consider discussion with provider regarding prn meds    - Consider URMILA (Moderate Risk only)   - Consider Code Support (High Risk only)   - Consider room safety checks   - Consider restraints  Outcome: Progressing     Problem: Patient Centered Care  Goal: Patient preferences are identified and integrated in the patient's plan of care  Description: Interventions:  - What would you like us to know as we care for you?   - Provide timely, complete, and accurate information to patient/family  - Incorporate patient and family knowledge, values, beliefs, and cultural backgrounds into the planning and delivery of care  - Encourage patient/family to participate in care and decision-making at the level they choose  - Honor patient and family perspectives and choices  Outcome: Progressing      Problem: RESPIRATORY - ADULT  Goal: Achieves optimal ventilation and oxygenation  Description: INTERVENTIONS:  - Assess for changes in respiratory status  - Assess for changes in mentation and behavior  - Position to facilitate oxygenation and minimize respiratory  effort  - Oxygen supplementation based on oxygen saturation or ABGs  - Provide Smoking Cessation handout, if applicable  - Encourage broncho-pulmonary hygiene including cough, deep breathe, Incentive Spirometry  - Assess the need for suctioning and perform as needed  - Assess and instruct to report SOB or any respiratory difficulty  - Respiratory Therapy support as indicated  - Manage/alleviate anxiety  - Monitor for signs/symptoms of CO2 retention  Outcome: Progressing     Problem: NEUROLOGICAL - ADULT  Goal: Achieves stable or improved neurological status  Description: INTERVENTIONS  - Assess for and report changes in neurological status  - Initiate measures to prevent increased intracranial pressure  - Maintain blood pressure and fluid volume within ordered parameters to optimize cerebral perfusion and minimize risk of hemorrhage  - Monitor temperature, glucose, and sodium. Initiate appropriate interventions as ordered  Outcome: Not progressing  Goal: Absence of seizures  Description: INTERVENTIONS  - Monitor for seizure activity  - Administer anti-seizure medications as ordered  - Monitor neurological status  Outcome: Progressing  Goal: Remains free of injury related to seizure activity  Description: INTERVENTIONS:  - Maintain airway, patient safety  and administer oxygen as ordered  - Monitor patient for seizure activity, document and report duration and description of seizure to MD/LIP  - If seizure occurs, turn patient to side and suction secretions as needed  - Reorient patient post seizure  - Seizure pads on all 4 side rails  - Instruct patient/family to notify RN of any seizure activity  - Instruct patient/family to call for assistance with activity based on assessment  Outcome: Progressing  Goal: Achieves maximal functionality and self care  Description: INTERVENTIONS  - Monitor swallowing and airway patency with patient fatigue and changes in neurological status  - Encourage and assist patient to  increase activity and self care with guidance from PT/OT  - Encourage visually impaired, hearing impaired and aphasic patients to use assistive/communication devices  Outcome: Progressing     Received patient from CCU. Lethargic, oriented to self. Vital signs are stable. Appears comfortable with no sign of distress.  Restraints in place.  IVF infusing. Safety precautions in place.

## 2025-07-01 NOTE — OCCUPATIONAL THERAPY NOTE
Ongoing OT followup: plan for hospice consult with family; pt is at a detention level of care and will need 24/7 assist and LTC; no skilled needs at the acute care level; will d/c from caseload; if any new functional needs arise, please re-order

## 2025-07-01 NOTE — PROGRESS NOTES
[facility], [place]  part of [facility]    Progress Note    Hamzah Shoemaker Jr. Patient Status:  Inpatient    1935 MRN [id number]   Location [facility] 2W/SW Attending David Henderson,    Hosp Day # 2 PCP Jimbo Fowler MD       Subjective:     Unable to perform ROS    Comfortable on room air  Poor historian  No cough no reported abdominal pain or vomiting  Objective:   Blood pressure 90/58, pulse 73, temperature 97.3 °F (36.3 °C), temperature source Temporal, resp. rate 14, height 5' 10\" (1.778 m), weight 154 lb 9.6 oz (70.1 kg), SpO2 100%.  Physical Exam  Vitals and nursing note reviewed.   Constitutional:       General: He is not in acute distress.     Appearance: He is ill-appearing.   HENT:      Head: Atraumatic.      Mouth/Throat:      Mouth: Mucous membranes are dry.   Eyes:      General: No scleral icterus.  Cardiovascular:      Rate and Rhythm: Normal rate.      Heart sounds:      No gallop.   Pulmonary:      Effort: No respiratory distress.      Breath sounds: No wheezing, rhonchi or rales.   Chest:      Chest wall: No tenderness.   Abdominal:      General: Abdomen is flat. Bowel sounds are normal.      Palpations: Abdomen is soft.      Tenderness: There is no guarding.   Musculoskeletal:      Right lower leg: No edema.      Left lower leg: No edema.   Lymphadenopathy:      Cervical: No cervical adenopathy.   Skin:     General: Skin is dry.   Neurological:      Mental Status: Mental status is at baseline.         Results:   Lab Results   Component Value Date    WBC 9.4 2025    WBC 9.4 2025    HGB 11.3 (L) 2025    HGB 11.3 (L) 2025    HCT 37.8 (L) 2025    HCT 37.8 (L) 2025    .0 2025    .0 2025    CREATSERUM 1.06 2025    BUN 19 2025     (H) 2025    K 3.4 (L) 2025    K 3.4 (L) 2025     (H) 2025    CO2 25.0 2025     (H) 2025    CA 8.1 (L) 2025     ALB 3.2 (L) 07/03/2023    ALKPHO 58 07/03/2023    BILT 1.3 07/03/2023    TP 6.9 07/03/2023    AST 19 07/03/2023    ALT 20 07/03/2023    INR 1.22 (H) 07/03/2023    TSH 0.892 05/18/2020    LIP 23 07/03/2023    PSA 3.80 05/15/2019    MG 2.4 08/05/2023    PHOS 2.7 07/08/2023    TROPHS 21 06/29/2025       No results found.  EKG 12 Lead  Result Date: 6/30/2025  Probable sinus rhythm with marked baseline artifact Nonspecific ST and T wave abnormality Abnormal ECG When compared with ECG of 29-JUN-2025 11:32, Inverted T waves have replaced nonspecific T wave abnormality in Inferior leads T wave inversion now evident in Lateral leads QT has shortened Confirmed by ALEE HENDRICKSON, HERNANDEZ ([id number]) on 6/30/2025 5:45:14 PM      Assessment & Plan:       1-dehydration with hypernatremia and acute kidney injury  Calculated water deficit 5.9 L  D5W IV to replace water deficit     2-hypotension  Likely dehydration  /not sepsis  Blood culture negative and urine culture colony less than 100,000  Will discontinue IV antibiotics  Off Levophed     3-s/p mild hypoxia / resolved now on RA   chest x-ray was clear  Likely atelectasis and poor inspiration     4--advanced dementia  Further confusion related to dehydration and hypernatremia   Replace water deficit and supportive care     5-DVT prophylaxis  Patient on Eliquis     6-DNAR/selective    Transfer patient to medical floor                  Moe Fragoso MD  7/1/2025

## 2025-07-01 NOTE — PROGRESS NOTES
Cardiology Progress Note    Hamzah Shoemaker Jr. Patient Status:  Inpatient    1935 MRN [id number]   Location [facility] 2W/SW Attending David Henderson DO   Hosp Day # 2 PCP Jimbo Fowler MD     Interval Note:  Pt seen and examined  Hospice consulted, meeting later today regarding decision  Echo shows normal EF, mild valve abnormality    --------------------------------------------------------------------------------------------------------------------------------  ROS 12 systems reviewed, pertinent findings above.  ROS    History:  Past Medical History[1]  Past Surgical History[2]  Family History[3]   reports that he quit smoking about 69 years ago. His smoking use included cigarettes. He started smoking about 71 years ago. He has a 1 pack-year smoking history. He has never used smokeless tobacco. He reports current alcohol use. He reports that he does not use drugs.    Objective:   Temp: 97.3 °F (36.3 °C)  Pulse: 73  Resp: 14  BP: 90/58    Intake/Output:     Intake/Output Summary (Last 24 hours) at 2025 1029  Last data filed at 2025 0600  Gross per 24 hour   Intake 2983 ml   Output 1085 ml   Net 1898 ml       Physical Exam:    General: Lethargic  HEENT: Normocephalic, anicteric sclera, neck supple.  Neck: No JVD, carotids 2+, no bruits.  Cardiac: Regular rate and rhythm. S1, S2 normal. No murmur, pericardial rub, S3.  Lungs: Clear without wheezes, rales, rhonchi or dullness.  Normal excursions and effort.  Abdomen: Soft, non-tender. BS-present.  Extremities: Without clubbing, cyanosis or edema.  Peripheral pulses are 2+.  Neurologic: Non-focal  Skin: Warm and dry.       Assessment:    Altered mental status  Possible UTI  Hypotension  Hypoxia  Hyponatremia  LORENZO  Advanced dementia  Abnormal EKG, sinus rhythm with frequent PACs        Plan:  Other than intermittent hypotension, patient is stable  Echo shows normal EF,  mild valve abnormalities  Hospice consulted, family meeting today for  final decision  Improving hypernatremia  No new recommendations from  cardiology standpoint    Thank you for allowing me to take part in the care of Hamzah FOSS Navid Umana. Please call with any questions of concerns.      Level of care: L3    Freyamiguel DO Jaiden  [facility]   Interventional Cardiac and Vascular Services      Freyamiguel Hwang DO  July 01, 2025  10:29 AM         [1]   Past Medical History:   Benign prostatic hypertrophy    Blepharitis    OU    Cataract    OD    Cataract    OS    Chalazion of left upper eyelid    OS    Diverticulitis    Floaters    OS    KIDNEY STONE    MGD (meibomian gland dysfunction)    OU    Neurogenic bladder    self caths TID    Other and unspecified hyperlipidemia    OTHER DISEASES    Hypogonadism    Pinguecula    OU   [2]   Past Surgical History:  Procedure Laterality Date    Appendectomy  1943    Colonoscopy,diagnostic  8/14/09    5mm ascending and sigmoid adenomatous polyps, diverticulosis    Colonoscopy,diagnostic  10/2/15    sigmoid polyp, diverticulosis    Colonoscopy,remv lesn,snare N/A 10/2/2015    Procedure: COLONOSCOPY, POSSIBLE BIOPSY, POSSIBLE POLYPECTOMY 02920;  Surgeon: Emre Arellano MD;  Location: [facility]    Hernia surgery  8/27/09    Umbilical hernia [facility] Dr Gresham    Hernia surgery  12/08/2020    [facility]    Knee replacement surgery Right 3/06    R TKR  Serafin Diamond    Other surgical history      knee surgery    Other surgical history      doris fx with repair    Other surgical history  1976    Fx R leg ORIF #2 arthotomy procedures    Other surgical history  7-6-11    cysto, dr melchor    Other surgical history  8-11-11    cysto, laser litho bladder stone, laser shaving of prostate Dr Melchor    Other surgical history  7/21/16    cystoscopy dr melchor     Other surgical history  9/1/16    interstim pne dr melchor    Patient documented not to have experienced any of the following events N/A 10/2/2015    Procedure: COLONOSCOPY, POSSIBLE  BIOPSY, POSSIBLE POLYPECTOMY 15344;  Surgeon: Emre Arellano MD;  Location: [facility]    Patient withough preoperative order for iv antibiotic surgical site infection prophylaxis. N/A 10/2/2015    Procedure: COLONOSCOPY, POSSIBLE BIOPSY, POSSIBLE POLYPECTOMY 41404;  Surgeon: Emre Arellano MD;  Location: [facility]    Tonsillectomy  1940   [3]   Family History  Problem Relation Age of Onset    Glaucoma Mother     Diabetes Neg         family h/o

## 2025-07-01 NOTE — CM/SW NOTE
BRITTNIW and DIVNIA Shine had phone call informational hospice meeting with pt's son Jorge L. Hospice philosophy, levels of care and Medicare benefit discussed. All questions answered. Son would like to discuss with his siblings before making a final decision. Pt would qualify for inpatient hospice to start. Son will call  later today or tomorrow morning with a final hospice decision. LSW updated MD Henderson, DIVINA Sutton, and JULES Moore on hospice meeting outcome and plans for follow up.      MANJINDER Dent  [facility]  [phone]

## 2025-07-01 NOTE — PLAN OF CARE
Problem: Safety Risk - Non-Violent Restraints  Goal: Patient will remain free from self-harm  Description: INTERVENTIONS:  - Apply the least restrictive restraint to prevent harm  - Notify patient and family of reasons restraints applied  - Assess for any contributing factors to confusion (electrolyte disturbances, delirium, medications)  - Discontinue any unnecessary medical devices as soon as possible  - Assess the patient's physical comfort, circulation, skin condition, hydration, nutrition and elimination needs   - Reorient and redirection as needed  - Assess for the need to continue restraints  Outcome: Progressing     Problem: Risk for Violence/Aggression  Goal: Absence of Violence/Aggression  Description: INTERVENTIONS:   - Identify precipitating factors for behavior   - Notify Charge RN/Provider   - Consider decreasing stimulation   - Consider distraction measures   - Consider discussion with provider regarding prn meds    - Consider URMILA (Moderate Risk only)   - Consider Code Support (High Risk only)   - Consider room safety checks   - Consider restraints  Outcome: Progressing     Problem: Patient Centered Care  Goal: Patient preferences are identified and integrated in the patient's plan of care  Description: Interventions:  - What would you like us to know as we care for you?   - Provide timely, complete, and accurate information to patient/family  - Incorporate patient and family knowledge, values, beliefs, and cultural backgrounds into the planning and delivery of care  - Encourage patient/family to participate in care and decision-making at the level they choose  - Honor patient and family perspectives and choices  Outcome: Progressing     Problem: RESPIRATORY - ADULT  Goal: Achieves optimal ventilation and oxygenation  Description: INTERVENTIONS:  - Assess for changes in respiratory status  - Assess for changes in mentation and behavior  - Position to facilitate oxygenation and minimize respiratory  effort  - Oxygen supplementation based on oxygen saturation or ABGs  - Provide Smoking Cessation handout, if applicable  - Encourage broncho-pulmonary hygiene including cough, deep breathe, Incentive Spirometry  - Assess the need for suctioning and perform as needed  - Assess and instruct to report SOB or any respiratory difficulty  - Respiratory Therapy support as indicated  - Manage/alleviate anxiety  - Monitor for signs/symptoms of CO2 retention  Outcome: Progressing     Problem: NEUROLOGICAL - ADULT  Goal: Achieves stable or improved neurological status  Description: INTERVENTIONS  - Assess for and report changes in neurological status  - Initiate measures to prevent increased intracranial pressure  - Maintain blood pressure and fluid volume within ordered parameters to optimize cerebral perfusion and minimize risk of hemorrhage  - Monitor temperature, glucose, and sodium. Initiate appropriate interventions as ordered  Outcome: Progressing  Goal: Absence of seizures  Description: INTERVENTIONS  - Monitor for seizure activity  - Administer anti-seizure medications as ordered  - Monitor neurological status  Outcome: Progressing  Goal: Remains free of injury related to seizure activity  Description: INTERVENTIONS:  - Maintain airway, patient safety  and administer oxygen as ordered  - Monitor patient for seizure activity, document and report duration and description of seizure to MD/LIP  - If seizure occurs, turn patient to side and suction secretions as needed  - Reorient patient post seizure  - Seizure pads on all 4 side rails  - Instruct patient/family to notify RN of any seizure activity  - Instruct patient/family to call for assistance with activity based on assessment  Outcome: Progressing  Goal: Achieves maximal functionality and self care  Description: INTERVENTIONS  - Monitor swallowing and airway patency with patient fatigue and changes in neurological status  - Encourage and assist patient to increase  activity and self care with guidance from PT/OT  - Encourage visually impaired, hearing impaired and aphasic patients to use assistive/communication devices  Outcome: Progressing

## 2025-07-01 NOTE — PLAN OF CARE
Pt AxO x1, frequent agitation, attempts to get out of bed and remove medical equipment despite posey and wrist restraints. Reoriented frequently, PRN haldol given. Continuous tremors in all extremities, difficult to obtain accurate BP at times, BP labile but stable. Na trended, fluids adjusted per Dr. Saqib Barraza. Hypoglycemic and symptomatic this AM, decreased LOC, D50 x1, no additional orders. Junior in place. Updated son, Sandeep, around 10pm, plans to speak with hospice today. All safety measures in place, frequent nursing rounds made.     Problem: Safety Risk - Non-Violent Restraints  Goal: Patient will remain free from self-harm  Description: INTERVENTIONS:  - Apply the least restrictive restraint to prevent harm  - Notify patient and family of reasons restraints applied  - Assess for any contributing factors to confusion (electrolyte disturbances, delirium, medications)  - Discontinue any unnecessary medical devices as soon as possible  - Assess the patient's physical comfort, circulation, skin condition, hydration, nutrition and elimination needs   - Reorient and redirection as needed  - Assess for the need to continue restraints  7/1/2025 0622 by Babs Wilde RN  Outcome: Progressing  7/1/2025 0621 by Babs Wilde RN  Outcome: Progressing     Problem: Risk for Violence/Aggression  Goal: Absence of Violence/Aggression  Description: INTERVENTIONS:   - Identify precipitating factors for behavior   - Notify Charge RN/Provider   - Consider decreasing stimulation   - Consider distraction measures   - Consider discussion with provider regarding prn meds    - Consider URMILA (Moderate Risk only)   - Consider Code Support (High Risk only)   - Consider room safety checks   - Consider restraints  7/1/2025 0622 by Babs Wilde RN  Outcome: Progressing  7/1/2025 0621 by Babs Wilde RN  Outcome: Progressing     Problem: Patient Centered Care  Goal: Patient preferences are identified and integrated in the  patient's plan of care  Description: Interventions:  - What would you like us to know as we care for you?   - Provide timely, complete, and accurate information to patient/family  - Incorporate patient and family knowledge, values, beliefs, and cultural backgrounds into the planning and delivery of care  - Encourage patient/family to participate in care and decision-making at the level they choose  - Honor patient and family perspectives and choices  7/1/2025 0622 by Babs Wilde RN  Outcome: Progressing  7/1/2025 0621 by Babs Wilde RN  Outcome: Progressing     Problem: Patient/Family Goals  Goal: Patient/Family Long Term Goal  Description: Patient's Long Term Goal:     Interventions:  -   - See additional Care Plan goals for specific interventions  7/1/2025 0622 by Babs Wilde RN  Outcome: Progressing  7/1/2025 0621 by Babs Wilde RN  Outcome: Progressing  Goal: Patient/Family Short Term Goal  Description: Patient's Short Term Goal:     Interventions:   -   - See additional Care Plan goals for specific interventions  7/1/2025 0622 by Babs Wilde RN  Outcome: Progressing  7/1/2025 0621 by Babs Wilde RN  Outcome: Progressing     Problem: RESPIRATORY - ADULT  Goal: Achieves optimal ventilation and oxygenation  Description: INTERVENTIONS:  - Assess for changes in respiratory status  - Assess for changes in mentation and behavior  - Position to facilitate oxygenation and minimize respiratory effort  - Oxygen supplementation based on oxygen saturation or ABGs  - Provide Smoking Cessation handout, if applicable  - Encourage broncho-pulmonary hygiene including cough, deep breathe, Incentive Spirometry  - Assess the need for suctioning and perform as needed  - Assess and instruct to report SOB or any respiratory difficulty  - Respiratory Therapy support as indicated  - Manage/alleviate anxiety  - Monitor for signs/symptoms of CO2 retention  7/1/2025 0622 by Babs Wilde RN  Outcome:  Progressing  7/1/2025 0621 by Babs Wilde RN  Outcome: Progressing     Problem: NEUROLOGICAL - ADULT  Goal: Achieves stable or improved neurological status  Description: INTERVENTIONS  - Assess for and report changes in neurological status  - Initiate measures to prevent increased intracranial pressure  - Maintain blood pressure and fluid volume within ordered parameters to optimize cerebral perfusion and minimize risk of hemorrhage  - Monitor temperature, glucose, and sodium. Initiate appropriate interventions as ordered  7/1/2025 0622 by Babs Wilde RN  Outcome: Progressing  7/1/2025 0621 by Babs Wilde RN  Outcome: Progressing  Goal: Absence of seizures  Description: INTERVENTIONS  - Monitor for seizure activity  - Administer anti-seizure medications as ordered  - Monitor neurological status  7/1/2025 0622 by Babs Wilde RN  Outcome: Progressing  7/1/2025 0621 by Babs Wilde RN  Outcome: Progressing  Goal: Remains free of injury related to seizure activity  Description: INTERVENTIONS:  - Maintain airway, patient safety  and administer oxygen as ordered  - Monitor patient for seizure activity, document and report duration and description of seizure to MD/LIP  - If seizure occurs, turn patient to side and suction secretions as needed  - Reorient patient post seizure  - Seizure pads on all 4 side rails  - Instruct patient/family to notify RN of any seizure activity  - Instruct patient/family to call for assistance with activity based on assessment  7/1/2025 0622 by Babs Wilde RN  Outcome: Progressing  7/1/2025 0621 by Babs Wilde RN  Outcome: Progressing  Goal: Achieves maximal functionality and self care  Description: INTERVENTIONS  - Monitor swallowing and airway patency with patient fatigue and changes in neurological status  - Encourage and assist patient to increase activity and self care with guidance from PT/OT  - Encourage visually impaired, hearing impaired and aphasic  patients to use assistive/communication devices  7/1/2025 0622 by Babs Wilde, RN  Outcome: Progressing  7/1/2025 0621 by Babs Wilde, RN  Outcome: Progressing

## 2025-07-01 NOTE — PLAN OF CARE
Pt AxO x    Problem: Safety Risk - Non-Violent Restraints  Goal: Patient will remain free from self-harm  Description: INTERVENTIONS:  - Apply the least restrictive restraint to prevent harm  - Notify patient and family of reasons restraints applied  - Assess for any contributing factors to confusion (electrolyte disturbances, delirium, medications)  - Discontinue any unnecessary medical devices as soon as possible  - Assess the patient's physical comfort, circulation, skin condition, hydration, nutrition and elimination needs   - Reorient and redirection as needed  - Assess for the need to continue restraints  Outcome: Progressing     Problem: Risk for Violence/Aggression  Goal: Absence of Violence/Aggression  Description: INTERVENTIONS:   - Identify precipitating factors for behavior   - Notify Charge RN/Provider   - Consider decreasing stimulation   - Consider distraction measures   - Consider discussion with provider regarding prn meds    - Consider URMILA (Moderate Risk only)   - Consider Code Support (High Risk only)   - Consider room safety checks   - Consider restraints  Outcome: Progressing     Problem: Patient Centered Care  Goal: Patient preferences are identified and integrated in the patient's plan of care  Description: Interventions:  - What would you like us to know as we care for you? ***  - Provide timely, complete, and accurate information to patient/family  - Incorporate patient and family knowledge, values, beliefs, and cultural backgrounds into the planning and delivery of care  - Encourage patient/family to participate in care and decision-making at the level they choose  - Honor patient and family perspectives and choices  Outcome: Progressing     Problem: Patient/Family Goals  Goal: Patient/Family Long Term Goal  Description: Patient's Long Term Goal: ***    Interventions:  - ***  - See additional Care Plan goals for specific interventions  Outcome: Progressing  Goal: Patient/Family Short Term  Goal  Description: Patient's Short Term Goal: ***    Interventions:   - ***  - See additional Care Plan goals for specific interventions  Outcome: Progressing     Problem: RESPIRATORY - ADULT  Goal: Achieves optimal ventilation and oxygenation  Description: INTERVENTIONS:  - Assess for changes in respiratory status  - Assess for changes in mentation and behavior  - Position to facilitate oxygenation and minimize respiratory effort  - Oxygen supplementation based on oxygen saturation or ABGs  - Provide Smoking Cessation handout, if applicable  - Encourage broncho-pulmonary hygiene including cough, deep breathe, Incentive Spirometry  - Assess the need for suctioning and perform as needed  - Assess and instruct to report SOB or any respiratory difficulty  - Respiratory Therapy support as indicated  - Manage/alleviate anxiety  - Monitor for signs/symptoms of CO2 retention  Outcome: Progressing     Problem: NEUROLOGICAL - ADULT  Goal: Achieves stable or improved neurological status  Description: INTERVENTIONS  - Assess for and report changes in neurological status  - Initiate measures to prevent increased intracranial pressure  - Maintain blood pressure and fluid volume within ordered parameters to optimize cerebral perfusion and minimize risk of hemorrhage  - Monitor temperature, glucose, and sodium. Initiate appropriate interventions as ordered  Outcome: Progressing  Goal: Absence of seizures  Description: INTERVENTIONS  - Monitor for seizure activity  - Administer anti-seizure medications as ordered  - Monitor neurological status  Outcome: Progressing  Goal: Remains free of injury related to seizure activity  Description: INTERVENTIONS:  - Maintain airway, patient safety  and administer oxygen as ordered  - Monitor patient for seizure activity, document and report duration and description of seizure to MD/LIP  - If seizure occurs, turn patient to side and suction secretions as needed  - Reorient patient post  seizure  - Seizure pads on all 4 side rails  - Instruct patient/family to notify RN of any seizure activity  - Instruct patient/family to call for assistance with activity based on assessment  Outcome: Progressing  Goal: Achieves maximal functionality and self care  Description: INTERVENTIONS  - Monitor swallowing and airway patency with patient fatigue and changes in neurological status  - Encourage and assist patient to increase activity and self care with guidance from PT/OT  - Encourage visually impaired, hearing impaired and aphasic patients to use assistive/communication devices  Outcome: Progressing

## 2025-07-02 ENCOUNTER — HOSPITAL ENCOUNTER (INPATIENT)
Facility: HOSPITAL | Age: OVER 89
End: 2025-07-02
Attending: INTERNAL MEDICINE | Admitting: INTERNAL MEDICINE

## 2025-07-02 PROBLEM — I67.2 CEREBRAL ATHEROSCLEROSIS: Status: ACTIVE | Noted: 2025-01-01

## 2025-07-02 PROBLEM — I67.2 CEREBRAL ATHEROSCLEROSIS: Status: ACTIVE | Noted: 2025-07-02

## 2025-07-02 NOTE — PLAN OF CARE
Problem: Safety Risk - Non-Violent Restraints  Goal: Patient will remain free from self-harm  Description: INTERVENTIONS:  - Apply the least restrictive restraint to prevent harm  - Notify patient and family of reasons restraints applied  - Assess for any contributing factors to confusion (electrolyte disturbances, delirium, medications)  - Discontinue any unnecessary medical devices as soon as possible  - Assess the patient's physical comfort, circulation, skin condition, hydration, nutrition and elimination needs   - Reorient and redirection as needed  - Assess for the need to continue restraints  Outcome: Progressing     Problem: Risk for Violence/Aggression  Goal: Absence of Violence/Aggression  Description: INTERVENTIONS:   - Identify precipitating factors for behavior   - Notify Charge RN/Provider   - Consider decreasing stimulation   - Consider distraction measures   - Consider discussion with provider regarding prn meds    - Consider URMILA (Moderate Risk only)   - Consider Code Support (High Risk only)   - Consider room safety checks   - Consider restraints  Outcome: Progressing     Problem: PAIN - ADULT  Goal: Verbalizes/displays adequate comfort level or patient's stated pain goal  Description: INTERVENTIONS:  - Encourage pt to monitor pain and request assistance  - Assess pain using appropriate pain scale  - Administer analgesics based on type and severity of pain and evaluate response  - Implement non-pharmacological measures as appropriate and evaluate response  - Consider cultural and social influences on pain and pain management  - Manage/alleviate anxiety  - Utilize distraction and/or relaxation techniques  - Monitor for opioid side effects  - Notify MD/LIP if interventions unsuccessful or patient reports new pain  - Anticipate increased pain with activity and pre-medicate as appropriate  Outcome: Progressing     Problem: SAFETY ADULT - FALL  Goal: Free from fall injury  Description:  INTERVENTIONS:  - Assess pt frequently for physical needs  - Identify cognitive and physical deficits and behaviors that affect risk of falls.  - [place] fall precautions as indicated by assessment.  - Educate pt/family on patient safety including physical limitations  - Instruct pt to call for assistance with activity based on assessment  - Modify environment to reduce risk of injury  - Provide assistive devices as appropriate  - Consider OT/PT consult to assist with strengthening/mobility  - Encourage toileting schedule  Outcome: Progressing     Problem: SKIN/TISSUE INTEGRITY - ADULT  Goal: Skin integrity remains intact  Description: INTERVENTIONS  - Assess and document risk factors for pressure ulcer development  - Assess and document skin integrity  - Monitor for areas of redness and/or skin breakdown  - Initiate interventions, skin care algorithm/standards of care as needed  Outcome: Progressing     Problem: NEUROLOGICAL - ADULT  Goal: Achieves maximal functionality and self care  Description: INTERVENTIONS  - Monitor swallowing and airway patency with patient fatigue and changes in neurological status  - Encourage and assist patient to increase activity and self care with guidance from PT/OT  - Encourage visually impaired, hearing impaired and aphasic patients to use assistive/communication devices  Outcome: Progressing     Problem: Impaired Functional Mobility  Goal: Achieve highest/safest level of mobility/gait  Description: Interventions:  - Assess patient's functional ability and stability  - Promote increasing activity/tolerance for mobility and gait  - Educate and engage patient/family in tolerated activity level and precautions  - Recommend use of total lift for transfers  Outcome: Progressing     Problem: Impaired Activities of Daily Living  Goal: Achieve highest/safest level of independence in self care  Description: Interventions:  - Assess ability and encourage patient to participate in ADLs to  maximize function  - Promote sitting position while performing ADLs such as feeding, grooming, and bathing  - Educate and encourage patient/family in tolerated functional activity level and precautions during self-care  Outcome: Progressing     Problem: Impaired Swallowing  Goal: Minimize aspiration risk  Description: Interventions:  - Patient should be alert and upright for all feedings (90 degrees preferred)  - Offer food and liquids at a slow rate  - No straws  - Encourage small bites of food and small sips of liquid  - Offer pills one at a time, crush or deliver with applesauce as needed  - Discontinue feeding and notify MD (or speech pathologist) if coughing or persistent throat clearing or wet/gurgly vocal quality is noted  Outcome: Progressing     Problem: Impaired Cognition  Goal: Patient will exhibit improved attention, thought processing and/or memory  Description: Interventions:  - Minimize distractions in the room when full attention is required  - Allow additional time for processing after asking questions or providing instructions  Outcome: Progressing     Problem: Impaired Communication  Goal: Patient will achieve maximal communication potential  Description: Interventions:  - Ask \"yes/no\" questions to facilitate patient's ability to communicate wants and needs  Outcome: Progressing     Problem: Patient/Family Goals  Goal: Patient/Family Long Term Goal  Description: Patient's Long Term Goal: comfort    Interventions:  - Follow POC  - meds as ordered  - See additional Care Plan goals for specific interventions  Outcome: Progressing  Goal: Patient/Family Short Term Goal  Description: Patient's Short Term Goal: improved agitation    Interventions:   - follow POC  - meds as ordered  - PRN meds as needed  - See additional Care Plan goals for specific interventions  Outcome: Progressing

## 2025-07-02 NOTE — H&P
[facility] Hospitalist H&P       CC: Inpatient hospice       PCP: Jimbo Fowler MD    History of Present Illness: Patient is a 89 year old male with PMH advanced dementia with history of behavioral disorders and psychosis, hypotension recently presented for profound dehydration and sepsis requiring pressors and significant acute kidney injury and IV fluids ultimately also developed hypoxia and sepsis secondary to urinary tract infection and chronic urinary retention patient was treated medically but prognosis remain guarded ultimately patient was admitted to inpatient hospice.     PMH  Past Medical History[1]     PSH  Past Surgical History[2]     ALL:  Allergies[3]     Home Medications:  Medications Taking[4]      Soc Hx  Social History     Tobacco Use    Smoking status: Former     Current packs/day: 0.00     Average packs/day: 0.5 packs/day for 2.0 years (1.0 ttl pk-yrs)     Types: Cigarettes     Start date: 1954     Quit date: 1956     Years since quittin.1    Smokeless tobacco: Never   Substance Use Topics    Alcohol use: Yes     Alcohol/week: 0.0 standard drinks of alcohol     Comment: occasional        Fam Hx  Family History[5]    Review of Systems  Comprehensive ROS reviewed and negative except for what's stated above.     OBJECTIVE:  There were no vitals taken for this visit.  General:  Awake alert, nonsensical speech               Throat: Dry mucous membranes       Lungs:   Clear to auscultation bilaterally       Heart:  Regular rate and rhythm                     Diagnostic Data:    CBC/Chem  Recent Labs   Lab 25  1123 25  0516 25  0608 25  1008   WBC 12.4* 11.7* 9.4  9.4 7.1   HGB 14.1 12.4* 11.3*  11.3* 13.1   MCV 99.1 101.0* 99.2  99.2 104.6*   .0 245.0 195.0  195.0 166.0       Recent Labs   Lab 25  1200 25  1757 25  2342 25  0555 25  1008   * 149* 151* 151* 149*   K 3.9 3.7 3.8 3.9  3.9 4.1   *  118* 119* 118* 119*   CO2 24.0 23.0 24.0 24.0 21.0   BUN 14 15 13 14 9   CREATSERUM 1.01 1.04 0.99 1.00 0.93   * 117* 104* 115* 115*   CA 8.3* 8.7 8.4* 8.8 8.6*       No results for input(s): \"ALT\", \"AST\", \"ALB\", \"AMYLASE\", \"LIPASE\", \"LDH\" in the last 168 hours.    Invalid input(s): \"ALPHOS\", \"TBIL\", \"DBIL\", \"TPROT\"    No results for input(s): \"TROP\" in the last 168 hours.      Radiology: No results found.      ASSESSMENT / PLAN:   Patient is a 89 year old male with PMH advanced dementia with history of behavioral disorders and psychosis, hypotension recently presented for profound dehydration and sepsis requiring pressors and significant acute kidney injury and IV fluids ultimately also developed hypoxia and sepsis secondary to urinary tract infection and chronic urinary retention patient was treated medically but prognosis remain guarded ultimately patient was admitted to inpatient hospice.       Acute inpatient hospice secondary to advanced dementia and moderate to severe protein calorie malnutrition with behavioral disturbances  -Medications as needed for behavioral disturbances  -Antibiotics have been completed 7 1    2.  Hypernatremia continue D5 at 50 an hour per son's request  Given hospice is not at goal no further blood tests are recommended    3.  History of DVT stopped prior to admission Eliquis    DNR comfort confirmed with son    FN:  - IVF: D5 at 50  - Diet: General Diet    DVT Prophy: Not needed and hospice  Lines: Peripheral IV    Dispo: Admitted to inpatient hospice expected [facility] to [facility]    Outpatient records or previous hospital records reviewed.     Further recommendations pending patient's clinical course.  [facility] hospitalist to continue to follow patient while in house    Patient and/or patient's family given opportunity to ask questions and note understanding and agreeing with therapeutic plan as outlined    Thank You,  David Henderson, DO    Hospitalist with [facility] and  [facility] [place]  Answering Service number: [phone]         [1]   Past Medical History:   Benign prostatic hypertrophy    Blepharitis    OU    Cataract    OD    Cataract    OS    Chalazion of left upper eyelid    OS    Diverticulitis    Floaters    OS    KIDNEY STONE    MGD (meibomian gland dysfunction)    OU    Neurogenic bladder    self caths TID    Other and unspecified hyperlipidemia    OTHER DISEASES    Hypogonadism    Pinguecula    OU   [2]   Past Surgical History:  Procedure Laterality Date    Appendectomy  1943    Colonoscopy,diagnostic  8/14/09    5mm ascending and sigmoid adenomatous polyps, diverticulosis    Colonoscopy,diagnostic  10/2/15    sigmoid polyp, diverticulosis    Colonoscopy,remv lesn,snare N/A 10/2/2015    Procedure: COLONOSCOPY, POSSIBLE BIOPSY, POSSIBLE POLYPECTOMY 66608;  Surgeon: Emre Arellano MD;  Location: [facility]    Hernia surgery  8/27/09    Umbilical hernia [facility] Dr Gresham    Hernia surgery  12/08/2020    [facility]    Knee replacement surgery Right 3/06    R TKR  Serafin Diamond    Other surgical history      knee surgery    Other surgical history      doris fx with repair    Other surgical history  1976    Fx R leg ORIF #2 arthotomy procedures    Other surgical history  7-6-11    cysto, dr melchor    Other surgical history  8-11-11    cysto, laser litho bladder stone, laser shaving of prostate Dr Melchor    Other surgical history  7/21/16    cystoscopy dr melchor     Other surgical history  9/1/16    interstim pne dr melchor    Patient documented not to have experienced any of the following events N/A 10/2/2015    Procedure: COLONOSCOPY, POSSIBLE BIOPSY, POSSIBLE POLYPECTOMY 74050;  Surgeon: Emre Arellano MD;  Location: [facility]    Patient withough preoperative order for iv antibiotic surgical site infection prophylaxis. N/A 10/2/2015    Procedure: COLONOSCOPY, POSSIBLE BIOPSY, POSSIBLE POLYPECTOMY 62822;  Surgeon: Emre Arellano MD;  [facility]: [facility]    Tonsillectomy  1940   [3] No Known Allergies  [4]   No outpatient medications have been marked as taking for the 7/2/25 encounter (Hospital Encounter).   [5]   Family History  Problem Relation Age of Onset    Glaucoma Mother     Diabetes Neg         family h/o

## 2025-07-02 NOTE — DISCHARGE SUMMARY
General Medicine Discharge Summary     Patient ID:  Hamzah Shoemaker Jr.  89 year old  4/13/1935    Admit date: 6/29/2025    Discharge date and time: 7/2/2025  3:17 PM     Attending Physician: David Henderson DO     Consults: IP CONSULT TO CARDIOLOGY  IP CONSULT TO NEPHROLOGY  IP CONSULT TO SOCIAL WORK  IP CONSULT TO HOSPICE  IP CONSULT TO SOCIAL WORK  IP CONSULT TO PSYCHIATRY  IP CONSULT TO SOCIAL WORK  IP CONSULT TO CRITICAL CARE INTENSIVIST  NURSING CONSULT TO DIETITIAN    Primary Care Physician: Jimbo Fowler MD     Reason for admission: Alteration in mental status sepsis weakness dehydration    Risk For Readmission: moderate / severe     Discharge Diagnoses: Transient alteration of awareness [R40.4]  Hypernatremia [E87.0]  Hypoxia [R09.02]  See Additional Discharge Diagnoses in Hospital Course    Discharged Condition: poor    Follow-up with labs/images appointments:   Patient admitted to inpatient hospice follow-up as needed    HPI:     Per Dr. Scott     Minimal hx obtained from pt, dementia. Hx from review of outside records in care everywhere & discussion with son Wilton at bedside with son/NADEEN Hopkins on phone     Hamzah Shoemaker Jr. - 89 year old male presents with confusion, AMS, hypoxia.     Dementia, NH resident who was admitted to  psych for mgmt of psychosis & agitation returned to NH few days ago, has been sleepy, lethargic. O2 tested, unable to obtain accurate pulse ox reading so sent to hospital. O2 okay in ER, returned to SNF. Later this AM, continued confusion, low O2, sent back to hospital. Labs w BMP - Na 167,      In ED, afebrile, BP lower. CMP w Na 167, LORENZO. CMP w wbc 12.4. Ua c/f infection. D/w Dr. Greco, admitting for further care.     Shortly after arrival to floor, pt with hypotension - RRT called, pt transferred to ICU.      Hospital Course:   Patient was admitted for altered mental status hypoxia hypotension found to have profound dehydration and urinary tract infection significant  hyponatremia likely as a result of free water deficit required IV pressors in the ICU and treated with IV antibiotics nephrology service was consulted patient was aggressively managed with IV fluids eventually after extensive family discussion patient was enrolled in inpatient hospice.       Operative Procedures:      Imaging: No results found.    Disposition: Inpatient hospice      Home Medication Changes: Several medications were stopped during inpatient hospice  FU      DC instructions:      I reconciled current and discharge medications on day of discharge, discussed changes with patient and noted changes above.       Total Time Coordinating Care: 35 minutes    Patient had opportunity to ask questions and state understand and agree with therapeutic plan as outlined    Thank You,    David Henderson,    Hospitalist with [facility]

## 2025-07-02 NOTE — PROGRESS NOTES
[facility]  part of [facility]    Progress Note    Hamzah Shoemaker Jr. Patient Status:  Inpatient    1935 MRN [id number]   Location [facility]5W Attending David Henderson DO   Hosp Day # 3 PCP Jimbo Fowler MD       Subjective:   Subjective:  Seen and examined  Comfortable today on room air  Seems more awake and receiving D5W  No reported cough  Objective:   Blood pressure (!) 133/98, pulse 70, temperature 97.8 °F (36.6 °C), temperature source Axillary, resp. rate 18, height 5' 10\" (1.778 m), weight 131 lb 9.6 oz (59.7 kg), SpO2 100%.  Physical Exam  Vitals and nursing note reviewed.   Constitutional:       General: He is not in acute distress.     Appearance: He is ill-appearing.   HENT:      Head: Atraumatic.   Eyes:      General: No scleral icterus.  Cardiovascular:      Rate and Rhythm: Normal rate.   Pulmonary:      Effort: No respiratory distress.      Breath sounds: No stridor. No wheezing, rhonchi or rales.   Abdominal:      Palpations: Abdomen is soft.   Lymphadenopathy:      Cervical: No cervical adenopathy.   Neurological:      General: No focal deficit present.         Results:   Lab Results   Component Value Date    WBC 7.1 2025    HGB 13.1 2025    HCT 45.2 2025    .0 2025    CREATSERUM 0.93 2025    BUN 9 2025     (H) 2025    K 4.1 2025     (H) 2025    CO2 21.0 2025     (H) 2025    CA 8.6 (L) 2025    ALB 3.2 (L) 2023    ALKPHO 58 2023    BILT 1.3 2023    TP 6.9 2023    AST 19 2023    ALT 20 2023    INR 1.22 (H) 2023    TSH 0.892 2020    LIP 23 2023    PSA 3.80 05/15/2019    MG 2.4 2023    PHOS 2.7 2023    TROPHS 21 2025         Assessment & Plan:      1-dehydration with hypernatremia and acute kidney injury  Better with hydration and free water replacement  HD better      2-s/p mild hypoxia /  resolved now on RA     3--advanced dementia  supportive care     4-DVT prophylaxis  Patient on Eliquis     5-DNAR/comfort       Will sign off               Moe Fragoso MD  7/2/2025

## 2025-07-02 NOTE — SPIRITUAL CARE NOTE
Spiritual Care Visit Note    Patient Name: Hamzah Shoemaker Jr. Date of Spiritual Care Visit: 25   : 1935 Primary Dx: <principal problem not specified>       Referred By: Referral From: Hospice    Spiritual Care Taxonomy:    Intended Effects: Demonstrate caring and concern    Methods: Offer emotional support    Interventions: Acknowledge current situation, Ask guided questions, Provide hospitality, Explain  role    Visit Type/Summary:     - Spiritual Care: Responded to a request for spiritual care and assessed patient for spiritual care needs. Consulted with RN prior to visit. Offered empathic listening and emotional support. Provided information regarding how to contact Spiritual Care and left a Spiritual Care information card.  remains available as needed for follow up.    Spiritual Care support can be requested via an Epic consult. For urgent/immediate needs, please contact the On Call  at: [place]: ext [phone]    Chaplain Resident, Klaudia Glez, PhD

## 2025-07-02 NOTE — HOSPICE RN NOTE
[facility] RN admitted the patient to general inpatient hospice per Dr. henderson and Dr. Jacome. Patient is eligible for general inpatient hospice care for symptom management of agitation requiring frequent nursing assessments and interventions including titration of IV medications.    Chart flipped.    Comfort order set placed. Haldol and Ativan prn for agitation.    Regular diet with pleasure feeding while patient is awake and upright only.    Aspiration and fall precautions in place.    PPS: 20%    POC discussed with son, Jorge L, and Leobardo RN. Update provided to Dr. Henderson. All in agreement. [facility] to follow daily to support the patient and family.    Stefany HALLN, RN PN  Transitional Nurse Liaison  [facility]  [phone] (After-hours)

## 2025-07-02 NOTE — PLAN OF CARE
Patient is A&O 1 with confusion. Monitoring vital signs, stable at this time. No acute changes at this moment. Patient is on wrist and vest restraints, documentation completed/assess every 2 hours.  Monitoring blood glucose levels. Agitation medication provided as needed. Fall precautions in place- bed alarm on, bed locked in lowest position, call light within reach. Frequent rounding by nursing staff.       Problem: Safety Risk - Non-Violent Restraints  Goal: Patient will remain free from self-harm  Description: INTERVENTIONS:  - Apply the least restrictive restraint to prevent harm  - Notify patient and family of reasons restraints applied  - Assess for any contributing factors to confusion (electrolyte disturbances, delirium, medications)  - Discontinue any unnecessary medical devices as soon as possible  - Assess the patient's physical comfort, circulation, skin condition, hydration, nutrition and elimination needs   - Reorient and redirection as needed  - Assess for the need to continue restraints  Outcome: Progressing     Problem: Risk for Violence/Aggression  Goal: Absence of Violence/Aggression  Description: INTERVENTIONS:   - Identify precipitating factors for behavior   - Notify Charge RN/Provider   - Consider decreasing stimulation   - Consider distraction measures   - Consider discussion with provider regarding prn meds    - Consider URMILA (Moderate Risk only)   - Consider Code Support (High Risk only)   - Consider room safety checks   - Consider restraints  Outcome: Progressing     Problem: Patient Centered Care  Goal: Patient preferences are identified and integrated in the patient's plan of care  Description: Interventions:  - What would you like us to know as we care for you?   - Provide timely, complete, and accurate information to patient/family  - Incorporate patient and family knowledge, values, beliefs, and cultural backgrounds into the planning and delivery of care  - Encourage patient/family to  participate in care and decision-making at the level they choose  - Honor patient and family perspectives and choices  Outcome: Progressing     Problem: Patient/Family Goals  Goal: Patient/Family Long Term Goal  Description: Patient's Long Term Goal: to be discharge    Interventions:  -  Monitor vital signs   - Monitor appropriate labs   -  Monitor blood glucose levels   - Pain management   - Administer medications per order   - Follow MD orders   - Diagnostics per order   - Update/inform patient and family on plan of care   - Discharge planning     - See additional Care Plan goals for specific interventions  Outcome: Progressing  Goal: Patient/Family Short Term Goal  Description: Patient's Short Term Goal: to feel better    Interventions:   -  Monitor vital signs   - Monitor appropriate labs   -  Monitor blood glucose levels   - Pain management   - Administer medications per order   - Follow MD orders   - Diagnostics per order   - Update/inform patient and family on plan of care   - Discharge planning     - See additional Care Plan goals for specific interventions  Outcome: Progressing     Problem: RESPIRATORY - ADULT  Goal: Achieves optimal ventilation and oxygenation  Description: INTERVENTIONS:  - Assess for changes in respiratory status  - Assess for changes in mentation and behavior  - Position to facilitate oxygenation and minimize respiratory effort  - Oxygen supplementation based on oxygen saturation or ABGs  - Provide Smoking Cessation handout, if applicable  - Encourage broncho-pulmonary hygiene including cough, deep breathe, Incentive Spirometry  - Assess the need for suctioning and perform as needed  - Assess and instruct to report SOB or any respiratory difficulty  - Respiratory Therapy support as indicated  - Manage/alleviate anxiety  - Monitor for signs/symptoms of CO2 retention  Outcome: Progressing     Problem: NEUROLOGICAL - ADULT  Goal: Achieves stable or improved neurological  status  Description: INTERVENTIONS  - Assess for and report changes in neurological status  - Initiate measures to prevent increased intracranial pressure  - Maintain blood pressure and fluid volume within ordered parameters to optimize cerebral perfusion and minimize risk of hemorrhage  - Monitor temperature, glucose, and sodium. Initiate appropriate interventions as ordered  Outcome: Progressing  Goal: Absence of seizures  Description: INTERVENTIONS  - Monitor for seizure activity  - Administer anti-seizure medications as ordered  - Monitor neurological status  Outcome: Progressing  Goal: Remains free of injury related to seizure activity  Description: INTERVENTIONS:  - Maintain airway, patient safety  and administer oxygen as ordered  - Monitor patient for seizure activity, document and report duration and description of seizure to MD/LIP  - If seizure occurs, turn patient to side and suction secretions as needed  - Reorient patient post seizure  - Seizure pads on all 4 side rails  - Instruct patient/family to notify RN of any seizure activity  - Instruct patient/family to call for assistance with activity based on assessment  Outcome: Progressing  Goal: Achieves maximal functionality and self care  Description: INTERVENTIONS  - Monitor swallowing and airway patency with patient fatigue and changes in neurological status  - Encourage and assist patient to increase activity and self care with guidance from PT/OT  - Encourage visually impaired, hearing impaired and aphasic patients to use assistive/communication devices  Outcome: Progressing

## 2025-07-02 NOTE — PROGRESS NOTES
[facility] Hospitalist Progress Note     CC: Hospital Follow up    PCP: Jimbo Fowler MD       Assessment/Plan:     Principal Problem:    Transient alteration of awareness  Active Problems:    Delirium superimposed on dementia    Episodic mood disorder    Hypernatremia    Hypoxia    LORENZO (acute kidney injury)      Hamzah Shoemaker - 89 year old male w Dementia w behav disturb admitted 6/29/2025 for hypoxia, ams, agitation, hypotension. Found to have severe hypotension presumably from profound dehydration with Na 167 & UTI. Required IV pressors in ICU. Started on IV abx. Nephro consulted - fluid mgmt.       All mental status secondary to toxic metabolic encephalopathy  - Likely as a result of underlying severe hypernatremia and sepsis secondary to UTI  - Slightly more awake today  - Continue treating underlying metabolic disturbances with IV fluids, change fluids to D5 at 50 an hour  Remove restraints if able    Hypoxia-intermittently on room air appears mostly resolved  -No clear signs of pneumonia    Sepsis 2/2 UTI  Chronic urinary retention  Hypotension/shock  -Continue fluids but D5 at 50 an hour trend BMP  Monitor blood pressure  Junior was exchanged  - zosyn (6/29 -7/1 )  Monitor off antibiotics  -Prognosis overall guarded    Hypernatremia-severe  - Likely 2/2 above /dehdration  -Increase D5 to 50 an hour trend BMP    Hypotension  shock  - Suspect hypovolemic shock 2/2 dehydration  - Fluids per nephro  - Trial of peripheral levo (per [facility] discussions, would NOT want central/art lines     Protein-calorie malnutrition  Dehydration  Swallowing difficulties - per fam  -Advanced dementia with behavioral disturbances  -Patient is appropriate for hospice ongoing goals of care discussion   - Per report patient may enroll in hospice    H/o DVT (POA)  - Continue PTA eliquis. Per med list on 5/5,   Would suggest stopping if patient enrolled in hospice     Advance care planning  - Per report patient may enroll in  hospice       Dispo  EDoD:  TBD. Inpatient hospice vs [facility]        Questions/concerns were discussed with patient and/or family by bedside.        Thank You,  David Henderson, DO    Hospitalist with [facility]     Subjective:     Awake, denies pain       OBJECTIVE:    Blood pressure (!) 133/98, pulse 70, temperature 97.8 °F (36.6 °C), temperature source Axillary, resp. rate 18, height 5' 10\" (1.778 m), weight 131 lb 9.6 oz (59.7 kg), SpO2 100%.    Temp:  [97.8 °F (36.6 °C)-98.3 °F (36.8 °C)] 97.8 °F (36.6 °C)  Pulse:  [57-99] 70  Resp:  [18-22] 18  BP: ()/(58-98) 133/98  SpO2:  [91 %-100 %] 100 %      Intake/Output:    Intake/Output Summary (Last 24 hours) at 7/2/2025 1318  Last data filed at 7/2/2025 0900  Gross per 24 hour   Intake 2138.75 ml   Output 2425 ml   Net -286.25 ml       Last 3 Weights   07/02/25 0611 131 lb 9.6 oz (59.7 kg)   07/01/25 0600 154 lb 9.6 oz (70.1 kg)   06/30/25 0900 150 lb 3.2 oz (68.1 kg)   06/29/25 1800 145 lb (65.8 kg)   08/02/23 1319 111 lb 8 oz (50.6 kg)   07/20/23 0019 117 lb 4.8 oz (53.2 kg)   07/19/23 2345 117 lb 4.8 oz (53.2 kg)   07/18/23 1039 126 lb 6.4 oz (57.3 kg)   07/03/23 1958 141 lb 8.6 oz (64.2 kg)   07/03/23 1357 145 lb (65.8 kg)       Exam   Gen: Awake able to follow some basic commands, somewhat muffled speech  Pulm: Lungs clear, normal respiratory effort  CV: Heart with regular rate and rhythm        Data Review:       Labs:     Recent Labs   Lab 06/29/25  1123 06/30/25  0516 07/01/25  0608 07/02/25  1008   RBC 4.64 4.15 3.81  3.81 4.32   HGB 14.1 12.4* 11.3*  11.3* 13.1   HCT 46.0 41.9 37.8*  37.8* 45.2   MCV 99.1 101.0* 99.2  99.2 104.6*   MCH 30.4 29.9 29.7  29.7 30.3   MCHC 30.7* 29.6* 29.9*  29.9* 29.0*   RDW 13.6 13.7 13.3  13.3 12.9   NEPRELIM 9.05* 8.62* 6.51  --    WBC 12.4* 11.7* 9.4  9.4 7.1   .0 245.0 195.0  195.0 166.0         Recent Labs   Lab 07/01/25  2342 07/02/25  0555 07/02/25  1008   * 115* 115*   BUN 13 14 9    CREATSERUM 0.99 1.00 0.93   EGFRCR 72 71 78   CA 8.4* 8.8 8.6*   * 151* 149*   K 3.8 3.9  3.9 4.1   * 118* 119*   CO2 24.0 24.0 21.0       No results for input(s): \"ALT\", \"AST\", \"ALB\", \"AMYLASE\", \"LIPASE\", \"LDH\" in the last 168 hours.    Invalid input(s): \"ALPHOS\", \"TBIL\", \"DBIL\", \"TPROT\"      Imaging:  No results found.        Meds:     Scheduled Medications[1]  Medication Infusions[2]  PRN Medications[3]           [1]    docusate  100 mg Oral BID    apixaban  5 mg Oral BID    memantine  10 mg Oral Nightly    haloperidol  1 mg Oral BID    mirtazapine  15 mg Oral Nightly    tamsulosin  0.4 mg Oral Daily   [2]    dextrose 50 mL/hr at 07/02/25 1010   [3]   polyethylene glycol (PEG 3350)    acetaminophen    OLANZapine    haloperidol lactate    glucose **OR** glucose **OR** glucose-vitamin C **OR** dextrose **OR** glucose **OR** glucose **OR** glucose-vitamin C

## 2025-07-02 NOTE — HOSPICE RN NOTE
[facility] received a phone call from son, Jorge L. He is ready to proceed with hospice. [employer] consents sent to Jorge L. Hospice will admit patient after consents are received.    Stefany CASAS, RN [facility]  Transitional Nurse Liaison  [facility]  [phone] (After-hours)

## 2025-07-02 NOTE — PROGRESS NOTES
Patient is a 89 year old , , male with past medical history of dementia, BPH, diverticulitis, who was admitted to the hospital for Transient alteration of awareness: The patient has been demonstrating alternation in mood and cognition. Patient was seen by Dr. Sorensen for initial consult.     Consult Duration   The patient seen for over 50-minute, follow-up evaluation, over 50% counseling and coordinating care addressing  agitation, confusion.  Record reviewed, communication with attending, communication with RN and patient seen face to face evaluation.    History of Present Illness:     Communicating with the team,  no issues reported. Patient transferred from CCU. Patient continues to have episods of increased anxiety and restlessness requiring use of restraints.     The patient received the following psychotropic medications: haldol 1 mg PO BID, namenda 10 mg, remeron 15 mg,     The patient seen today sitting in hospital bed. He presents calm and pleasantly confused this morning. He remains in restraints.    He has difficulty answering questions and engaging in conversation due to confusion and disorganized thought process.     He is alert to self.  He has difficulty answering questions and engaging in conversation due to confusion.    The patient has been demonstrating alternation in mood and cognition with episodes of  increased confusion, restlessness, agitation and response to internal stimuli. It was reported that patient was sedated with use of Seroquel. Seroquel discontinued and patient is more alert and makes some effort to cooperate with interview.    Medical History:   Past Medical History  Past Medical History[1]    Past Surgical History  Past Surgical History[2]    Family History  Family History[3]    Social History  Short Social Hx on File[4]        Current Medications:  Current Hospital Medications[5]  Prescriptions Prior to Admission[6]    Allergies  Allergies[7]    Review of  Systems:   As by Admitting/Attending    Results:   Laboratory Data:  Lab Results   Component Value Date    WBC 9.4 07/01/2025    WBC 9.4 07/01/2025    HGB 11.3 (L) 07/01/2025    HGB 11.3 (L) 07/01/2025    HCT 37.8 (L) 07/01/2025    HCT 37.8 (L) 07/01/2025    .0 07/01/2025    .0 07/01/2025    CREATSERUM 1.00 07/02/2025    BUN 14 07/02/2025     (H) 07/02/2025    K 3.9 07/02/2025    K 3.9 07/02/2025     (H) 07/02/2025    CO2 24.0 07/02/2025     (H) 07/02/2025    CA 8.8 07/02/2025    ALB 3.2 (L) 07/03/2023    ALKPHO 58 07/03/2023    TP 6.9 07/03/2023    AST 19 07/03/2023    ALT 20 07/03/2023    INR 1.22 (H) 07/03/2023    PTP 15.3 (H) 07/03/2023    TSH 0.892 05/18/2020    LIP 23 07/03/2023    PSA 3.80 05/15/2019    MG 2.4 08/05/2023    PHOS 2.7 07/08/2023         Imaging:  XR CHEST AP PORTABLE  (CPT=71045)  Result Date: 6/29/2025  CONCLUSION: 1. Cardiac enlargement with secondary signs of slight fluid overload. 2. Slightly diminished lung volumes with minimal bibasilar atelectasis. Electronically Verified and Signed by Attending Radiologist: Paul Holland MD 6/29/2025 12:21 PM Workstation: [employer]      Vital Signs:   Blood pressure (!) 133/98, pulse 70, temperature 97.8 °F (36.6 °C), temperature source Axillary, resp. rate 18, height 5' 10\" (1.778 m), weight 59.7 kg (131 lb 9.6 oz), SpO2 100%.    Mental Status Exam:   Appearance: Stated age male, in hospital gown, laying down in hospital bed.  Psychomotor: Patient has been demonstrating some sedate.  Orientation: Alert and oriented to person. Patient confused  Gait: Not evaluated.  Attitude/Coorperation: limited cooperation due to confusion   Behavior: episodes of restlessness and agitation.  Speech: Regular rate and rhythm speech.  Mood: anxious  Affect: restricted  Thought process: Confused, disorganized  Thought content: No reports of  suicidal or homicidal ideation.  Perceptions: Patient has been demonstrating response to  internal stimuli.  Concentration: Grossly impaired  Memory: Grossly impaired  Intellect: Average.  Judgment and Insight: Questionable.     Impression:       Delirium superimposed on dementia   Episodic mood disorder    Transient alteration of awareness    Hypernatremia    Hypoxia    LORENZO (acute kidney injury)    Patient is a 89 year old , , male with past medical history of dementia, BPH, diverticulitis, who was admitted to the hospital for Transient alteration of awareness: The patient has been demonstrating alternation in mood and cognition. Patient was seen by Dr. Sorensen for initial consult.     The patient has been demonstrating alternation in mood and cognition with episodes of  increased confusion, restlessness, agitation and response to internal stimuli. It was reported that patient was sedated with use of Seroquel. Seroquel discontinued and patient is more alert and makes some effort to cooperate with interview.    7/1/2025:Patient continues to have episodes of increased anxiety, restlessness, agitation requiring use of restraints.    7/2/2025: No issues overnight. Patient transferred to medical floor from ccu. Patient calm this morning.     Discussed risk and benefit, acknowledging the current symptom and severity.  At this point, I would recommend the following approach:     Focus on safety  Focus on education and support.  Focus on insight orientation helping the patient understand diagnosis and treatment plan.  Continue haldol 1 mg BID  Continue namenda 10 mg nightly  Continue Remeron 15 mg nightly  Utilize haldol 2 mg IV q 6 hours PRN  Processed with patient at length, the initiation of the above psychotropic medications I advised the patient of the risks, benefits, alternatives and potential side effects. The patient consents to administration of the medications and understands the right to refuse medications at any time. The patient verbalized understanding.   Coordinate plan with  team    Orders This Visit:  Orders Placed This Encounter   Procedures    Basic Metabolic Panel (8)    CBC With Differential With Platelet    Troponin I (High Sensitivity)    Pro Beta Natriuretic Peptide    Expanded Arterial Blood Gas    Urinalysis with Culture Reflex    REDRAW BMP    Basic Metabolic Panel (8)    Basic Metabolic Panel (8)    CBC With Differential With Platelet    Sodium, Urine, Random    REDRAW BMP    CBC, Platelet; No Differential    Basic Metabolic Panel (8)    CBC With Differential With Platelet    Potassium    Potassium    Urine Culture, Routine    Blood Culture    Clostridium difficile(toxigenic)PCR    MRSA Screen by PCR       Meds This Visit:  Requested Prescriptions      No prescriptions requested or ordered in this encounter       Myesha Castellanos, APRN  7/2/2025    Note to Patient: The 21st Century Cures Act makes medical notes like these available to patients in the interest of transparency. However, be advised this is a medical document. It is intended as peer to peer communication. It is written in medical language and may contain abbreviations or verbiage that are unfamiliar. It may appear blunt or direct. Medical documents are intended to carry relevant information, facts as evident, and the clinical opinion of the practitioner. This note may have been transcribed using a voice dictation system. Voice recognition errors may occur. This should not be taken to alter the content or meaning of this note.           [1]   Past Medical History:   Benign prostatic hypertrophy    Blepharitis    OU    Cataract    OD    Cataract    OS    Chalazion of left upper eyelid    OS    Diverticulitis    Floaters    OS    KIDNEY STONE    MGD (meibomian gland dysfunction)    OU    Neurogenic bladder    self caths TID    Other and unspecified hyperlipidemia    OTHER DISEASES    Hypogonadism    Pinguecula    OU   [2]   Past Surgical History:  Procedure Laterality Date    Appendectomy  1943    Colonoscopy,diagnostic   09    5mm ascending and sigmoid adenomatous polyps, diverticulosis    Colonoscopy,diagnostic  10/2/15    sigmoid polyp, diverticulosis    Colonoscopy,remv lesn,snare N/A 10/2/2015    Procedure: COLONOSCOPY, POSSIBLE BIOPSY, POSSIBLE POLYPECTOMY 27812;  Surgeon: Emre Arellano MD;  Location: [facility]    Hernia surgery  09    Umbilical hernia [facility] [place]    Hernia surgery  2020    [facility]    Knee replacement surgery Right 3/06    R TKR  Serafin Diamond    Other surgical history      knee surgery    Other surgical history      doris fx with repair    Other surgical history      Fx R leg ORIF #2 arthotomy procedures    Other surgical history  11    cysto, dr melchor    Other surgical history  11    cysto, laser litho bladder stone, laser shaving of prostate Dr Melchor    Other surgical history  16    cystoscopy dr melchor     Other surgical history  16    interstim pne dr melchor    Patient documented not to have experienced any of the following events N/A 10/2/2015    Procedure: COLONOSCOPY, POSSIBLE BIOPSY, POSSIBLE POLYPECTOMY 35763;  Surgeon: Emre Arellano MD;  Location: [facility]    Patient withough preoperative order for iv antibiotic surgical site infection prophylaxis. N/A 10/2/2015    Procedure: COLONOSCOPY, POSSIBLE BIOPSY, POSSIBLE POLYPECTOMY 65882;  Surgeon: Emre Arellano MD;  Location: [facility]    Tonsillectomy  1940   [3]   Family History  Problem Relation Age of Onset    Glaucoma Mother     Diabetes Neg         family h/o   [4]   Social History  Socioeconomic History    Marital status:    Tobacco Use    Smoking status: Former     Current packs/day: 0.00     Average packs/day: 0.5 packs/day for 2.0 years (1.0 ttl pk-yrs)     Types: Cigarettes     Start date: 1954     Quit date: 1956     Years since quittin.1    Smokeless tobacco: Never   Substance and Sexual Activity    Alcohol use: Yes      Alcohol/week: 0.0 standard drinks of alcohol     Comment: occasional    Drug use: No   Other Topics Concern    Caffeine Concern Yes     Social Drivers of Health     Food Insecurity: Low Risk  (6/13/2025)    Received from [facility] Food Security     Within the past 12 months, the food you bought just didn't last and you didn't have money to get more.: 3     Within the past 12 months, you worried that your food would run out before you got money to buy more.: 3   Transportation Needs: Not At Risk (6/13/2025)    Received from [facility] Transportation Needs     In the past 12 months, has lack of reliable transportation kept you from medical appointments, meetings, work or from getting things needed for daily living?: No   Stress: Stress Concern Present (6/13/2025)    Received from [facility]    [employer] [place] - Occupational Stress Questionnaire     Feeling of Stress : To some extent   Housing Stability: Not At Risk (6/13/2025)    Received from [facility] Housing     What is your living situation today?: I have a steady place to live     Think about the place you live. Do you have problems with any of the following?: None of the above   [5]   Current Facility-Administered Medications   Medication Dose Route Frequency    docusate (Colace) 50 MG/5ML oral solution 100 mg  100 mg Oral BID    apixaban (Eliquis) tab 5 mg  5 mg Oral BID    polyethylene glycol (PEG 3350) (Miralax) 17 g oral packet 17 g  17 g Oral Daily PRN    memantine (Namenda) tab 10 mg  10 mg Oral Nightly    haloperidol (Haldol) tab 1 mg  1 mg Oral BID    mirtazapine (Remeron) tab 15 mg  15 mg Oral Nightly    acetaminophen (Tylenol Extra Strength) tab 500 mg  500 mg Oral Q6H PRN    dextrose 10% infusion   Intravenous Continuous    tamsulosin (Flomax) cap 0.4 mg  0.4 mg Oral Daily    OLANZapine (ZyPREXA Zydis) disintegrating tab 5 mg  5 mg Oral Q8H PRN    haloperidol lactate (Haldol) 5 MG/ML injection 2  mg  2 mg Intravenous Q4H PRN    glucose (Dex4) 15 GM/59ML oral liquid 15 g  15 g Oral Q15 Min PRN    Or    glucose (Glutose) 40% oral gel 15 g  15 g Oral Q15 Min PRN    Or    glucose-vitamin C (Dex-4) chewable tab 4 tablet  4 tablet Oral Q15 Min PRN    Or    dextrose 50% injection 50 mL  50 mL Intravenous Q15 Min PRN    Or    glucose (Dex4) 15 GM/59ML oral liquid 30 g  30 g Oral Q15 Min PRN    Or    glucose (Glutose) 40% oral gel 30 g  30 g Oral Q15 Min PRN    Or    glucose-vitamin C (Dex-4) chewable tab 8 tablet  8 tablet Oral Q15 Min PRN   [6]   Medications Prior to Admission   Medication Sig    apixaban 5 MG Oral Tab Take 1 tablet (5 mg total) by mouth 2 (two) times daily.    haloperidol 1 MG Oral Tab Take 1 tablet (1 mg total) by mouth 2 (two) times daily.    memantine 10 MG Oral Tab Take 1 tablet (10 mg total) by mouth nightly.    mirtazapine 15 MG Oral Tab Take 1 tablet (15 mg total) by mouth nightly.    [] cephALEXin 500 MG Oral Cap Take 1 capsule (500 mg total) by mouth 3 (three) times daily for 7 days.    QUEtiapine 25 MG Oral Tab Take 1 tablet (25 mg total) by mouth 3 (three) times daily. Hold for sedation    polyethylene glycol, PEG 3350, 17 g Oral Powd Pack Take 17 g by mouth daily as needed (constipation).    docusate sodium 100 MG Oral Cap Take 1 capsule (100 mg total) by mouth 2 (two) times daily.    OLANZapine 5 MG Oral Tablet Dispersible Take 1 tablet (5 mg total) by mouth every 8 (eight) hours as needed (Aggitation).    acetaminophen 500 MG Oral Tab Take 1 tablet (500 mg total) by mouth every 6 (six) hours as needed for Pain.    tamsulosin 0.4 MG Oral Cap Take 1 capsule (0.4 mg total) by mouth daily.   [7] No Known Allergies

## 2025-07-02 NOTE — PLAN OF CARE
Problem: Safety Risk - Non-Violent Restraints  Goal: Patient will remain free from self-harm  Description: INTERVENTIONS:  - Apply the least restrictive restraint to prevent harm  - Notify patient and family of reasons restraints applied  - Assess for any contributing factors to confusion (electrolyte disturbances, delirium, medications)  - Discontinue any unnecessary medical devices as soon as possible  - Assess the patient's physical comfort, circulation, skin condition, hydration, nutrition and elimination needs   - Reorient and redirection as needed  - Assess for the need to continue restraints  Outcome: Progressing     Problem: Risk for Violence/Aggression  Goal: Absence of Violence/Aggression  Description: INTERVENTIONS:   - Identify precipitating factors for behavior   - Notify Charge RN/Provider   - Consider decreasing stimulation   - Consider distraction measures   - Consider discussion with provider regarding prn meds    - Consider URMILA (Moderate Risk only)   - Consider Code Support (High Risk only)   - Consider room safety checks   - Consider restraints  Outcome: Progressing     Problem: Patient Centered Care  Goal: Patient preferences are identified and integrated in the patient's plan of care  Description: Interventions:  - What would you like us to know as we care for you?   - Provide timely, complete, and accurate information to patient/family  - Incorporate patient and family knowledge, values, beliefs, and cultural backgrounds into the planning and delivery of care  - Encourage patient/family to participate in care and decision-making at the level they choose  - Honor patient and family perspectives and choices  Outcome: Progressing     Problem: Patient/Family Goals  Goal: Patient/Family Long Term Goal  Description: Patient's Long Term Goal: comfort    Interventions:  - follow POC  - meds as ordered  - See additional Care Plan goals for specific interventions  Outcome: Progressing  Goal:  Patient/Family Short Term Goal  Description: Patient's Short Term Goal: less agitation    Interventions:   - Follow POC; meds as ordered  - See additional Care Plan goals for specific interventions  Outcome: Progressing     Problem: RESPIRATORY - ADULT  Goal: Achieves optimal ventilation and oxygenation  Description: INTERVENTIONS:  - Assess for changes in respiratory status  - Assess for changes in mentation and behavior  - Position to facilitate oxygenation and minimize respiratory effort  - Oxygen supplementation based on oxygen saturation or ABGs  - Provide Smoking Cessation handout, if applicable  - Encourage broncho-pulmonary hygiene including cough, deep breathe, Incentive Spirometry  - Assess the need for suctioning and perform as needed  - Assess and instruct to report SOB or any respiratory difficulty  - Respiratory Therapy support as indicated  - Manage/alleviate anxiety  - Monitor for signs/symptoms of CO2 retention  Outcome: Progressing     Problem: NEUROLOGICAL - ADULT  Goal: Achieves stable or improved neurological status  Description: INTERVENTIONS  - Assess for and report changes in neurological status  - Initiate measures to prevent increased intracranial pressure  - Maintain blood pressure and fluid volume within ordered parameters to optimize cerebral perfusion and minimize risk of hemorrhage  - Monitor temperature, glucose, and sodium. Initiate appropriate interventions as ordered  Outcome: Progressing  Goal: Absence of seizures  Description: INTERVENTIONS  - Monitor for seizure activity  - Administer anti-seizure medications as ordered  - Monitor neurological status  Outcome: Progressing  Goal: Remains free of injury related to seizure activity  Description: INTERVENTIONS:  - Maintain airway, patient safety  and administer oxygen as ordered  - Monitor patient for seizure activity, document and report duration and description of seizure to MD/LIP  - If seizure occurs, turn patient to side and  suction secretions as needed  - Reorient patient post seizure  - Seizure pads on all 4 side rails  - Instruct patient/family to notify RN of any seizure activity  - Instruct patient/family to call for assistance with activity based on assessment  Outcome: Progressing  Goal: Achieves maximal functionality and self care  Description: INTERVENTIONS  - Monitor swallowing and airway patency with patient fatigue and changes in neurological status  - Encourage and assist patient to increase activity and self care with guidance from PT/OT  - Encourage visually impaired, hearing impaired and aphasic patients to use assistive/communication devices  Outcome: Progressing

## 2025-07-03 NOTE — HOSPICE RN NOTE
[facility] Inpatient Nursing Rounds:     Hospice RN and Liaison visited patient at bedside. No family present. Will reach out via telephone. Patient unresponsive at time of visit had recently received ivp dose of morphine. Has orders for  scheduled oral haldol due to severe agitation and oral Colace for constipation, neither was given this morning due to an inability to tolerate swallowing. Furrowed brow and facial grimacing present. Breathing labored, shallow, RR 24, diminished in all fields. Skin cool, pale. Indwelling urinary catheter remains in place, draining small amount of yellow urine. LBM: 7/2/25. Bowel sounds hypoactive in all quadrants.     Reviewed symptom management over the past 24 hours: Haldol PO 1mg for agitation 2 times daily, Colace 100mg PO 2 times daily for constipation. PRN medications given include: haloperidol 2 mg IVP x2 for agitation, lorazepam 1 mg IVP x1 for agitation.     PPS: 10%     Patient receiving general inpatient hospice care for symptom management of agitation requiring frequent nursing assessments and interventions including the administration and titration of IV medications per Dr. Henderson and Dr. Jacome. Plan to transfer to routine level of care at [facility], pending symptom management of agitation. POC discussed with Sultana MURCIA at bedside, in agreement. [facility] will continue to support the patient and family.    James Wesley RN BSN  [facility]  Transitional Nurse Liaison  [phone] / [phone] (after hours)

## 2025-07-03 NOTE — CM/SW NOTE
MD order received regarding POLST.  The pt. Is currently inpatient hospice.  If POLST is needed for discharge, [facility] will follow up.      MANJINDER Perez ext [phone]

## 2025-07-03 NOTE — PLAN OF CARE
Patient is A&O 1. Monitoring vital signs, stable at this time. No acute changes at this moment. Patient is on vest restraints, documentation completed/assess ever 2 hours. Agitation medication provided as needed. Fall precautions in place- bed alarm on, bed locked in lowest position, call light within reach. Frequent rounding by nursing staff.       Problem: Safety Risk - Non-Violent Restraints  Goal: Patient will remain free from self-harm  Description: INTERVENTIONS:  - Apply the least restrictive restraint to prevent harm  - Notify patient and family of reasons restraints applied  - Assess for any contributing factors to confusion (electrolyte disturbances, delirium, medications)  - Discontinue any unnecessary medical devices as soon as possible  - Assess the patient's physical comfort, circulation, skin condition, hydration, nutrition and elimination needs   - Reorient and redirection as needed  - Assess for the need to continue restraints  Outcome: Progressing     Problem: Risk for Violence/Aggression  Goal: Absence of Violence/Aggression  Description: INTERVENTIONS:   - Identify precipitating factors for behavior   - Notify Charge RN/Provider   - Consider decreasing stimulation   - Consider distraction measures   - Consider discussion with provider regarding prn meds    - Consider URMILA (Moderate Risk only)   - Consider Code Support (High Risk only)   - Consider room safety checks   - Consider restraints  Outcome: Progressing     Problem: PAIN - ADULT  Goal: Verbalizes/displays adequate comfort level or patient's stated pain goal  Description: INTERVENTIONS:  - Encourage pt to monitor pain and request assistance  - Assess pain using appropriate pain scale  - Administer analgesics based on type and severity of pain and evaluate response  - Implement non-pharmacological measures as appropriate and evaluate response  - Consider cultural and social influences on pain and pain management  - Manage/alleviate  anxiety  - Utilize distraction and/or relaxation techniques  - Monitor for opioid side effects  - Notify MD/LIP if interventions unsuccessful or patient reports new pain  - Anticipate increased pain with activity and pre-medicate as appropriate  Outcome: Progressing     Problem: SAFETY ADULT - FALL  Goal: Free from fall injury  Description: INTERVENTIONS:  - Assess pt frequently for physical needs  - Identify cognitive and physical deficits and behaviors that affect risk of falls.  - [place] fall precautions as indicated by assessment.  - Educate pt/family on patient safety including physical limitations  - Instruct pt to call for assistance with activity based on assessment  - Modify environment to reduce risk of injury  - Provide assistive devices as appropriate  - Consider OT/PT consult to assist with strengthening/mobility  - Encourage toileting schedule  Outcome: Progressing     Problem: SKIN/TISSUE INTEGRITY - ADULT  Goal: Skin integrity remains intact  Description: INTERVENTIONS  - Assess and document risk factors for pressure ulcer development  - Assess and document skin integrity  - Monitor for areas of redness and/or skin breakdown  - Initiate interventions, skin care algorithm/standards of care as needed  Outcome: Progressing     Problem: NEUROLOGICAL - ADULT  Goal: Achieves maximal functionality and self care  Description: INTERVENTIONS  - Monitor swallowing and airway patency with patient fatigue and changes in neurological status  - Encourage and assist patient to increase activity and self care with guidance from PT/OT  - Encourage visually impaired, hearing impaired and aphasic patients to use assistive/communication devices  Outcome: Progressing     Problem: Impaired Functional Mobility  Goal: Achieve highest/safest level of mobility/gait  Description: Interventions:  - Assess patient's functional ability and stability  - Promote increasing activity/tolerance for mobility and gait  - Educate and  engage patient/family in tolerated activity level and precautions    Outcome: Progressing     Problem: Impaired Activities of Daily Living  Goal: Achieve highest/safest level of independence in self care  Description: Interventions:  - Assess ability and encourage patient to participate in ADLs to maximize function  - Promote sitting position while performing ADLs such as feeding, grooming, and bathing  - Educate and encourage patient/family in tolerated functional activity level and precautions during self-care    Outcome: Progressing     Problem: Impaired Swallowing  Goal: Minimize aspiration risk  Description: Interventions:  - Patient should be alert and upright for all feedings (90 degrees preferred)  - Offer food and liquids at a slow rate  - No straws  - Encourage small bites of food and small sips of liquid  - Offer pills one at a time, crush or deliver with applesauce as needed  - Discontinue feeding and notify MD (or speech pathologist) if coughing or persistent throat clearing or wet/gurgly vocal quality is noted  Outcome: Progressing     Problem: Impaired Cognition  Goal: Patient will exhibit improved attention, thought processing and/or memory  Description: Interventions:  Outcome: Progressing     Problem: Impaired Communication  Goal: Patient will achieve maximal communication potential  Description: Interventions:  Outcome: Progressing     Problem: Patient/Family Goals  Goal: Patient/Family Long Term Goal  Description: Patient's Long Term Goal: comfort    Interventions:  - Follow POC  - meds as ordered  - See additional Care Plan goals for specific interventions  Outcome: Progressing  Goal: Patient/Family Short Term Goal  Description: Patient's Short Term Goal: improved agitation    Interventions:   - follow POC  - meds as ordered  - PRN meds as needed  - See additional Care Plan goals for specific interventions  Outcome: Progressing

## 2025-07-03 NOTE — PROGRESS NOTES
[facility] Hospitalist Progress Note     CC: Hospital Follow up    PCP: Jimbo Fowler MD       Assessment/Plan:     Active Problems:    Cerebral atherosclerosis        Patient is a 89 year old male with PMH advanced dementia with history of behavioral disorders and psychosis, hypotension recently presented for profound dehydration and sepsis requiring pressors and significant acute kidney injury and IV fluids ultimately also developed hypoxia and sepsis secondary to urinary tract infection and chronic urinary retention patient was treated medically but prognosis remain guarded ultimately patient was admitted to inpatient hospice.  Comfort care ongoing.         Acute inpatient hospice secondary to advanced dementia and moderate protein calorie malnutrition with behavioral disturbances  -Comfort care ongoing  -Disposition planning per hospice team  - Patient appears lethargic today  Comfortable however  Reasonable to stop IV fluids defer to hospice team    DNR comfort confirmed with son     FN:  - IVF: D5 at 50, consider stopping   - Diet: General Diet     DVT Prophy: Not needed and hospice  Lines: Peripheral IV  Disposition planning may need to go to subacute rehab for hospice    Questions/concerns were discussed with patient and/or family by bedside.    Thank You,  David Henderson,     Hospitalist with [facility]     Subjective:     Non verbal, lethargic     OBJECTIVE:    Blood pressure (!) 120/95, pulse 57, temperature 98.8 °F (37.1 °C), temperature source Axillary, resp. rate 23, SpO2 98%.    Temp:  [98.8 °F (37.1 °C)-99.1 °F (37.3 °C)] 98.8 °F (37.1 °C)  Pulse:  [] 57  Resp:  [20-23] 23  BP: ()/(58-95) 120/95  SpO2:  [96 %-98 %] 98 %      Intake/Output:    Intake/Output Summary (Last 24 hours) at 7/3/2025 1057  Last data filed at 7/3/2025 0500  Gross per 24 hour   Intake 665 ml   Output 1800 ml   Net -1135 ml       Last 3 Weights   07/02/25 0611 131 lb 9.6 oz (59.7 kg)    07/01/25 0600 154 lb 9.6 oz (70.1 kg)   06/30/25 0900 150 lb 3.2 oz (68.1 kg)   06/29/25 1800 145 lb (65.8 kg)   08/02/23 1319 111 lb 8 oz (50.6 kg)   07/20/23 0019 117 lb 4.8 oz (53.2 kg)   07/19/23 2345 117 lb 4.8 oz (53.2 kg)   07/18/23 1039 126 lb 6.4 oz (57.3 kg)   07/03/23 1958 141 lb 8.6 oz (64.2 kg)   07/03/23 1357 145 lb (65.8 kg)       Exam   Gen: Lethargic laying in bed appears comfortable  Pulm: Lungs clear  CV: Heart with regular rate       Data Review:       Labs:     Recent Labs   Lab 06/29/25  1123 06/30/25  0516 07/01/25  0608 07/02/25  1008   RBC 4.64 4.15 3.81  3.81 4.32   HGB 14.1 12.4* 11.3*  11.3* 13.1   HCT 46.0 41.9 37.8*  37.8* 45.2   MCV 99.1 101.0* 99.2  99.2 104.6*   MCH 30.4 29.9 29.7  29.7 30.3   MCHC 30.7* 29.6* 29.9*  29.9* 29.0*   RDW 13.6 13.7 13.3  13.3 12.9   NEPRELIM 9.05* 8.62* 6.51  --    WBC 12.4* 11.7* 9.4  9.4 7.1   .0 245.0 195.0  195.0 166.0         Recent Labs   Lab 07/01/25  2342 07/02/25  0555 07/02/25  1008   * 115* 115*   BUN 13 14 9   CREATSERUM 0.99 1.00 0.93   EGFRCR 72 71 78   CA 8.4* 8.8 8.6*   * 151* 149*   K 3.8 3.9  3.9 4.1   * 118* 119*   CO2 24.0 24.0 21.0       No results for input(s): \"ALT\", \"AST\", \"ALB\", \"AMYLASE\", \"LIPASE\", \"LDH\" in the last 168 hours.    Invalid input(s): \"ALPHOS\", \"TBIL\", \"DBIL\", \"TPROT\"      Imaging:  No results found.      Meds:     Scheduled Medications[1]  Medication Infusions[2]  PRN Medications[3]           [1]    docusate  100 mg Oral BID    haloperidol  1 mg Oral BID    mirtazapine  15 mg Oral Nightly   [2]    dextrose 50 mL/hr at 07/03/25 0855   [3]   OLANZapine    polyethylene glycol (PEG 3350)    sodium chloride 0.9%    morphINE    morphINE    haloperidol lactate **OR** haloperidol lactate    LORazepam    LORazepam **OR** LORazepam **OR** LORazepam    scopolamine    glycopyrrolate    ondansetron **OR** ondansetron

## 2025-07-04 NOTE — HOSPICE RN NOTE
[facility] Inpatient Nursing Rounds:     Hospice RN visited patient at bedside. No family present. Will reach out via telephone. Patient asleep during visit. Has scheduled oral Haldol for agitiation and oral colace for constipation, last 3 doses of both have been missed due to the pt being unable to tolerate any oral medication. Breathing shallow with a RR 14, diminished in all fields. Indwelling pinzon catheter remains in place draining wisam colored urine. LBM: 7/4/25. Bowel sounds active in all quadrants.     Reviewed symptom management over the past 24 hours: PRN medications given include: haloperidol 1 mg IVP x1 for agitation, Morphine 1 mg IVP x1 for dyspnea, lorazepam 2 mg IVP x4 for agitation.     PPS: 10%     Patient receiving general inpatient hospice care for symptom management of dyspnea and agitation requiring frequent nursing assessments and interventions including the administration and titration of IV medications per Dr. Neumann and Dr. Jacome. Plan to transfer to routine level of care at [facility], pending symptom management of agitation. POC discussed with Sultana MURCIA at bedside, in agreement. [facility] will continue to support the patient and family.     James Wesley RN BSN  [facility]  Transitional Nurse Liaison  [phone] / [phone] (after hours)

## 2025-07-04 NOTE — PROGRESS NOTES
[facility] Hospitalist Progress Note     CC: Hospital Follow up    PCP: Jimbo Fowler MD       Assessment/Plan:     Active Problems:    Cerebral atherosclerosis        Mr. Shoemaker is a 89 year old male with PMH advanced dementia with history of behavioral disorders and psychosis, hypotension recently presented for profound dehydration and sepsis requiring pressors and significant acute kidney injury and IV fluids ultimately also developed hypoxia and sepsis secondary to urinary tract infection and chronic urinary retention patient was treated medically but prognosis remain guarded ultimately patient was admitted to inpatient hospice.  Comfort care ongoing.         Acute inpatient hospice secondary to advanced dementia and moderate protein calorie malnutrition with behavioral disturbances  -Comfort care ongoing  -Disposition planning per hospice team  - Patient appears lethargic today  Comfortable however  Reasonable to stop IV fluids defer to hospice team    DNR comfort confirmed with son     FN:  - IVF: D5 at 50, consider stopping   - Diet: General Diet     DVT Prophy: Not needed and hospice  Lines: Peripheral IV  Disposition planning may need to go to subacute rehab for hospice    Questions/concerns were discussed with patient and/or family by bedside.    Ivy Neumann MD  Hospitalist with [facility]     Subjective:     Lethargic. Did not respond to verbal     OBJECTIVE:    Blood pressure (!) 85/57, pulse 50, temperature 97.6 °F (36.4 °C), temperature source Axillary, resp. rate 15, SpO2 (!) 83%.    Temp:  [97.2 °F (36.2 °C)-97.6 °F (36.4 °C)] 97.6 °F (36.4 °C)  Pulse:  [50-96] 50  Resp:  [15-18] 15  BP: (85-96)/(57-72) 85/57  SpO2:  [83 %-90 %] 83 %      Intake/Output:    Intake/Output Summary (Last 24 hours) at 7/4/2025 1248  Last data filed at 7/4/2025 1206  Gross per 24 hour   Intake 988.33 ml   Output 475 ml   Net 513.33 ml       Last 3 Weights   07/02/25 0611 131 lb 9.6 oz (59.7  kg)   07/01/25 0600 154 lb 9.6 oz (70.1 kg)   06/30/25 0900 150 lb 3.2 oz (68.1 kg)   06/29/25 1800 145 lb (65.8 kg)   08/02/23 1319 111 lb 8 oz (50.6 kg)   07/20/23 0019 117 lb 4.8 oz (53.2 kg)   07/19/23 2345 117 lb 4.8 oz (53.2 kg)   07/18/23 1039 126 lb 6.4 oz (57.3 kg)   07/03/23 1958 141 lb 8.6 oz (64.2 kg)   07/03/23 1357 145 lb (65.8 kg)       Exam   Gen: Lethargic laying in bed appears comfortable  Pulm: Lungs clear  CV: Heart with regular rate       Data Review:       Labs:     Recent Labs   Lab 06/29/25  1123 06/30/25  0516 07/01/25  0608 07/02/25  1008   RBC 4.64 4.15 3.81  3.81 4.32   HGB 14.1 12.4* 11.3*  11.3* 13.1   HCT 46.0 41.9 37.8*  37.8* 45.2   MCV 99.1 101.0* 99.2  99.2 104.6*   MCH 30.4 29.9 29.7  29.7 30.3   MCHC 30.7* 29.6* 29.9*  29.9* 29.0*   RDW 13.6 13.7 13.3  13.3 12.9   NEPRELIM 9.05* 8.62* 6.51  --    WBC 12.4* 11.7* 9.4  9.4 7.1   .0 245.0 195.0  195.0 166.0         Recent Labs   Lab 07/01/25  2342 07/02/25  0555 07/02/25  1008   * 115* 115*   BUN 13 14 9   CREATSERUM 0.99 1.00 0.93   EGFRCR 72 71 78   CA 8.4* 8.8 8.6*   * 151* 149*   K 3.8 3.9  3.9 4.1   * 118* 119*   CO2 24.0 24.0 21.0       No results for input(s): \"ALT\", \"AST\", \"ALB\", \"AMYLASE\", \"LIPASE\", \"LDH\" in the last 168 hours.    Invalid input(s): \"ALPHOS\", \"TBIL\", \"DBIL\", \"TPROT\"      Imaging:  No results found.      Meds:     Scheduled Medications[1]  Medication Infusions[2]  PRN Medications[3]           [1]    docusate  100 mg Oral BID    haloperidol  1 mg Oral BID    mirtazapine  15 mg Oral Nightly   [2]    dextrose 50 mL/hr at 07/04/25 0315   [3]   OLANZapine    polyethylene glycol (PEG 3350)    sodium chloride 0.9%    morphINE    morphINE    haloperidol lactate **OR** haloperidol lactate    LORazepam    LORazepam **OR** LORazepam **OR** LORazepam    scopolamine    glycopyrrolate    ondansetron **OR** ondansetron

## 2025-07-04 NOTE — PLAN OF CARE
Patient is lethargic, and minimally responsive. Unable to follow commands. Can be agitated at times - ativan and haldol prn. Morphine prn for pain. Junior in place. + BM, incontinence care provided. Frequent repositioning and oral care provided as tolerated. Comfort measures in place. Appropriate safety measures maintained.        Problem: PAIN - ADULT  Goal: Verbalizes/displays adequate comfort level or patient's stated pain goal  Description: INTERVENTIONS:  - Encourage pt to monitor pain and request assistance  - Assess pain using appropriate pain scale  - Administer analgesics based on type and severity of pain and evaluate response  - Implement non-pharmacological measures as appropriate and evaluate response  - Consider cultural and social influences on pain and pain management  - Manage/alleviate anxiety  - Utilize distraction and/or relaxation techniques  - Monitor for opioid side effects  - Notify MD/LIP if interventions unsuccessful or patient reports new pain  - Anticipate increased pain with activity and pre-medicate as appropriate  Outcome: Progressing     Problem: SAFETY ADULT - FALL  Goal: Free from fall injury  Description: INTERVENTIONS:  - Assess pt frequently for physical needs  - Identify cognitive and physical deficits and behaviors that affect risk of falls.  - [place] fall precautions as indicated by assessment.  - Educate pt/family on patient safety including physical limitations  - Instruct pt to call for assistance with activity based on assessment  - Modify environment to reduce risk of injury  - Provide assistive devices as appropriate  - Consider OT/PT consult to assist with strengthening/mobility  - Encourage toileting schedule  Outcome: Progressing     Problem: Patient/Family Goals  Goal: Patient/Family Long Term Goal  Description: Patient's Long Term Goal: comfort    Interventions:  - Follow POC  - meds as ordered  - See additional Care Plan goals for specific interventions  Outcome:  Progressing  Goal: Patient/Family Short Term Goal  Description: Patient's Short Term Goal: improved agitation    Interventions:   - follow POC  - meds as ordered  - PRN meds as needed  - See additional Care Plan goals for specific interventions  Outcome: Progressing

## 2025-07-05 NOTE — PROGRESS NOTES
[facility] Hospitalist Progress Note     CC: Hospital Follow up    PCP: Jimbo Fowler MD       Assessment/Plan:     Active Problems:    Cerebral atherosclerosis        Mr. Shoemaker is a 89 year old male with PMH advanced dementia with history of behavioral disorders and psychosis, hypotension recently presented for profound dehydration and sepsis requiring pressors and significant acute kidney injury and IV fluids ultimately also developed hypoxia and sepsis secondary to urinary tract infection and chronic urinary retention patient was treated medically but prognosis remain guarded ultimately patient was admitted to inpatient hospice.  Comfort care ongoing.         Acute inpatient hospice secondary to advanced dementia and moderate protein calorie malnutrition with behavioral disturbances  -Comfort care ongoing  -Disposition planning per hospice team  - Patient appears lethargic today  Comfortable however  Reasonable to stop IV fluids defer to hospice team    DNR comfort confirmed with son     FN:  - IVF: D5 at 50, consider stopping   - Diet: General Diet    Lines: Peripheral IV  Dispo: inpatient hospice, may need to go to subacute rehab for hospice    Questions/concerns were discussed with patient and/or family by bedside.    Ivy Neumann MD  Hospitalist with [facility]     Subjective:     Lethargtic, received PRN meds overnight for agitation, calm this AM    OBJECTIVE:    Blood pressure (!) 161/117, pulse 54, temperature 98.2 °F (36.8 °C), temperature source Axillary, resp. rate 12, SpO2 (!) 83%.    Temp:  [97.6 °F (36.4 °C)-98.2 °F (36.8 °C)] 98.2 °F (36.8 °C)  Pulse:  [50-54] 54  Resp:  [12] 12  BP: ()/() 161/117  SpO2:  [83 %] 83 %      Intake/Output:    Intake/Output Summary (Last 24 hours) at 7/5/2025 1136  Last data filed at 7/5/2025 1027  Gross per 24 hour   Intake 0 ml   Output 775 ml   Net -775 ml       Last 3 Weights   07/02/25 0611 131 lb 9.6 oz (59.7 kg)    07/01/25 0600 154 lb 9.6 oz (70.1 kg)   06/30/25 0900 150 lb 3.2 oz (68.1 kg)   06/29/25 1800 145 lb (65.8 kg)   08/02/23 1319 111 lb 8 oz (50.6 kg)   07/20/23 0019 117 lb 4.8 oz (53.2 kg)   07/19/23 2345 117 lb 4.8 oz (53.2 kg)   07/18/23 1039 126 lb 6.4 oz (57.3 kg)   07/03/23 1958 141 lb 8.6 oz (64.2 kg)   07/03/23 1357 145 lb (65.8 kg)       Exam   Gen: Lethargic laying in bed appears comfortable  Pulm: Lungs clear  CV: Heart with regular rate       Data Review:       Labs:     Recent Labs   Lab 06/29/25  1123 06/30/25  0516 07/01/25  0608 07/02/25  1008   RBC 4.64 4.15 3.81  3.81 4.32   HGB 14.1 12.4* 11.3*  11.3* 13.1   HCT 46.0 41.9 37.8*  37.8* 45.2   MCV 99.1 101.0* 99.2  99.2 104.6*   MCH 30.4 29.9 29.7  29.7 30.3   MCHC 30.7* 29.6* 29.9*  29.9* 29.0*   RDW 13.6 13.7 13.3  13.3 12.9   NEPRELIM 9.05* 8.62* 6.51  --    WBC 12.4* 11.7* 9.4  9.4 7.1   .0 245.0 195.0  195.0 166.0         Recent Labs   Lab 07/01/25  2342 07/02/25  0555 07/02/25  1008   * 115* 115*   BUN 13 14 9   CREATSERUM 0.99 1.00 0.93   EGFRCR 72 71 78   CA 8.4* 8.8 8.6*   * 151* 149*   K 3.8 3.9  3.9 4.1   * 118* 119*   CO2 24.0 24.0 21.0       No results for input(s): \"ALT\", \"AST\", \"ALB\", \"AMYLASE\", \"LIPASE\", \"LDH\" in the last 168 hours.    Invalid input(s): \"ALPHOS\", \"TBIL\", \"DBIL\", \"TPROT\"      Imaging:  No results found.      Meds:     Scheduled Medications[1]  Medication Infusions[2]  PRN Medications[3]           [1]    docusate  100 mg Oral BID    haloperidol  1 mg Oral BID    mirtazapine  15 mg Oral Nightly   [2]    dextrose 50 mL/hr at 07/04/25 2356   [3]   OLANZapine    polyethylene glycol (PEG 3350)    sodium chloride 0.9%    morphINE    morphINE    haloperidol lactate **OR** haloperidol lactate    LORazepam    LORazepam **OR** LORazepam **OR** LORazepam    scopolamine    glycopyrrolate    ondansetron **OR** ondansetron     no

## 2025-07-05 NOTE — PLAN OF CARE
Patient is lethargic, and minimally responsive. Unable to follow commands. Can be agitated at times - ativan prn. Junior in place. + BM, incontinence care provided. Frequent repositioning and oral care provided as tolerated. Comfort measures in place. Appropriate safety measures maintained; bed alarm and safety cam for added safety.      Problem: PAIN - ADULT  Goal: Verbalizes/displays adequate comfort level or patient's stated pain goal  Description: INTERVENTIONS:  - Encourage pt to monitor pain and request assistance  - Assess pain using appropriate pain scale  - Administer analgesics based on type and severity of pain and evaluate response  - Implement non-pharmacological measures as appropriate and evaluate response  - Consider cultural and social influences on pain and pain management  - Manage/alleviate anxiety  - Utilize distraction and/or relaxation techniques  - Monitor for opioid side effects  - Notify MD/LIP if interventions unsuccessful or patient reports new pain  - Anticipate increased pain with activity and pre-medicate as appropriate  Outcome: Progressing     Problem: SAFETY ADULT - FALL  Goal: Free from fall injury  Description: INTERVENTIONS:  - Assess pt frequently for physical needs  - Identify cognitive and physical deficits and behaviors that affect risk of falls.  - [place] fall precautions as indicated by assessment.  - Educate pt/family on patient safety including physical limitations  - Instruct pt to call for assistance with activity based on assessment  - Modify environment to reduce risk of injury  - Provide assistive devices as appropriate  - Consider OT/PT consult to assist with strengthening/mobility  - Encourage toileting schedule  Outcome: Progressing     Problem: SKIN/TISSUE INTEGRITY - ADULT  Goal: Skin integrity remains intact  Description: INTERVENTIONS  - Assess and document risk factors for pressure ulcer development  - Assess and document skin integrity  - Monitor for areas  of redness and/or skin breakdown  - Initiate interventions, skin care algorithm/standards of care as needed  Outcome: Progressing     Problem: NEUROLOGICAL - ADULT  Goal: Achieves maximal functionality and self care  Description: INTERVENTIONS  - Monitor swallowing and airway patency with patient fatigue and changes in neurological status  - Encourage and assist patient to increase activity and self care with guidance from PT/OT  - Encourage visually impaired, hearing impaired and aphasic patients to use assistive/communication devices  Outcome: Progressing     Problem: Impaired Functional Mobility  Goal: Achieve highest/safest level of mobility/gait  Description: Interventions:  - Assess patient's functional ability and stability  - Promote increasing activity/tolerance for mobility and gait  - Educate and engage patient/family in tolerated activity level and precautions    Outcome: Progressing     Problem: Impaired Activities of Daily Living  Goal: Achieve highest/safest level of independence in self care  Description: Interventions:  - Assess ability and encourage patient to participate in ADLs to maximize function  - Promote sitting position while performing ADLs such as feeding, grooming, and bathing  - Educate and encourage patient/family in tolerated functional activity level and precautions during self-care    Outcome: Progressing     Problem: Impaired Swallowing  Goal: Minimize aspiration risk  Description: Interventions:  - Patient should be alert and upright for all feedings (90 degrees preferred)  - Offer food and liquids at a slow rate  - No straws  - Encourage small bites of food and small sips of liquid  - Offer pills one at a time, crush or deliver with applesauce as needed  - Discontinue feeding and notify MD (or speech pathologist) if coughing or persistent throat clearing or wet/gurgly vocal quality is noted  Outcome: Progressing     Problem: Impaired Cognition  Goal: Patient will exhibit improved  attention, thought processing and/or memory  Description: Interventions:    Outcome: Progressing     Problem: Impaired Communication  Goal: Patient will achieve maximal communication potential  Description: Interventions:    Outcome: Progressing     Problem: Patient/Family Goals  Goal: Patient/Family Long Term Goal  Description: Patient's Long Term Goal: comfort    Interventions:  - Follow POC  - meds as ordered  - See additional Care Plan goals for specific interventions  Outcome: Progressing  Goal: Patient/Family Short Term Goal  Description: Patient's Short Term Goal: improved agitation    Interventions:   - follow POC  - meds as ordered  - PRN meds as needed  - See additional Care Plan goals for specific interventions  Outcome: Progressing     Problem: Delirium  Goal: Minimize duration of delirium  Description: Interventions:  - Encourage use of hearing aids, eye glasses  - Promote highest level of mobility daily  - Provide frequent reorientation  - Promote wakefulness i.e. lights on, blinds open  - Promote sleep, encourage patient's normal rest cycle i.e. lights off, TV off, minimize noise and interruptions  - Encourage family to assist in orientation and promotion of home routines  Outcome: Progressing

## 2025-07-05 NOTE — PLAN OF CARE
Patient is lethargic, and minimally responsive. Unable to follow commands. Can be agitated at times - ativan and haldol prn. Morphine prn for pain. Junior in place. + BM, incontinence care provided. Frequent repositioning and oral care provided as tolerated. Comfort measures in place. Appropriate safety measures maintained.Busy apron and video monitoring in place. Frequent rounding.          Problem: PAIN - ADULT  Goal: Verbalizes/displays adequate comfort level or patient's stated pain goal  Description: INTERVENTIONS:  - Encourage pt to monitor pain and request assistance  - Assess pain using appropriate pain scale  - Administer analgesics based on type and severity of pain and evaluate response  - Implement non-pharmacological measures as appropriate and evaluate response  - Consider cultural and social influences on pain and pain management  - Manage/alleviate anxiety  - Utilize distraction and/or relaxation techniques  - Monitor for opioid side effects  - Notify MD/LIP if interventions unsuccessful or patient reports new pain  - Anticipate increased pain with activity and pre-medicate as appropriate  Outcome: Progressing     Problem: SAFETY ADULT - FALL  Goal: Free from fall injury  Description: INTERVENTIONS:  - Assess pt frequently for physical needs  - Identify cognitive and physical deficits and behaviors that affect risk of falls.  - [place] fall precautions as indicated by assessment.  - Educate pt/family on patient safety including physical limitations  - Instruct pt to call for assistance with activity based on assessment  - Modify environment to reduce risk of injury  - Provide assistive devices as appropriate  - Consider OT/PT consult to assist with strengthening/mobility  - Encourage toileting schedule  Outcome: Progressing     Problem: Patient/Family Goals  Goal: Patient/Family Long Term Goal  Description: Patient's Long Term Goal: comfort    Interventions:  - Follow POC  - meds as ordered  - See  additional Care Plan goals for specific interventions  Outcome: Progressing  Goal: Patient/Family Short Term Goal  Description: Patient's Short Term Goal: improved agitation    Interventions:   - follow POC  - meds as ordered  - PRN meds as needed  - See additional Care Plan goals for specific interventions  Outcome: Progressing

## 2025-07-05 NOTE — HOSPICE RN NOTE
[facility] Inpatient Nursing Rounds:     Assessed patient at bedside, family not present, will reach out via telephone. Patient is unresponsive to verbal and tactile stimulus. Remains on remote video monitoring. Agitated overnight per nursing staff and also this morning, unable to tolerate scheduled oral medications for 3 days, MD aware and discontinued intolerable oral pill regimen. RR 12-14, shallow, labored and irregular on room air. Diminished lung sounds. Intermittent tremors to bilateral hands noted, also fidgeting with bilateral feet and torso occasionally during assessment. Skin warm to touch. LBM: 7/5. Bowel sounds hypoactive in all quadrants. Bed confined.  PPS: 10 %    Reviewed symptom management over the past 24 hours:    PRN Medications given include: IV Morphine 1mgx2 for dyspnea, IV Haldol 2mgx1 for agitation, IV Ativan 2mgx5 for agitation. Trial of PO Liquid Intensol 2mg q8H for ongoing agitation, Dextrose drip discontinued, Oral pills Colace, Remeron and Haldol discontinued per MD Neumann.     Patient receiving general inpatient hospice care for symptom management of dyspnea and agitation requiring frequent nursing assessments and interventions including the administration and titration of IV medications per Dr. Ivy Neumann and Dr. Sonny Jacome. Potential discharge to [facility] on hospice pending agitation and dyspnea intervention for symptom management. End of Life education provided along with discussion with family regarding daily assessment of GIP eligibility, to ensure treatment of symptoms and management of care is done at an appropriate level of care. POC discussed with NADEEN Shoemaker and Joanie Mclean RN. Both in agreement. [facility] will continue to follow this patient closely for end of life while supporting patient and family.    Jeanne HALLN, RN, PN  Transitional Nurse Liaison  [facility]  [phone]  (After-hours)

## 2025-07-05 NOTE — PLAN OF CARE
Comfort measures in place.  PRN Ativan for tremors and agitation. PRN Morphine for signs of pain.   +BM. Junior in place.   Turned and repositioned as tolerated.   Bed alarm in place. No family noted at bedside.     Problem: Safety Risk - Non-Violent Restraints  Goal: Patient will remain free from self-harm  Description: INTERVENTIONS:  - Apply the least restrictive restraint to prevent harm  - Notify patient and family of reasons restraints applied  - Assess for any contributing factors to confusion (electrolyte disturbances, delirium, medications)  - Discontinue any unnecessary medical devices as soon as possible  - Assess the patient's physical comfort, circulation, skin condition, hydration, nutrition and elimination needs   - Reorient and redirection as needed  - Assess for the need to continue restraints  Outcome: Progressing     Problem: Risk for Violence/Aggression  Goal: Absence of Violence/Aggression  Description: INTERVENTIONS:   - Identify precipitating factors for behavior   - Notify Charge RN/Provider   - Consider decreasing stimulation   - Consider distraction measures   - Consider discussion with provider regarding prn meds    - Consider URMILA (Moderate Risk only)   - Consider Code Support (High Risk only)   - Consider room safety checks   - Consider restraints  Outcome: Progressing     Problem: PAIN - ADULT  Goal: Verbalizes/displays adequate comfort level or patient's stated pain goal  Description: INTERVENTIONS:  - Encourage pt to monitor pain and request assistance  - Assess pain using appropriate pain scale  - Administer analgesics based on type and severity of pain and evaluate response  - Implement non-pharmacological measures as appropriate and evaluate response  - Consider cultural and social influences on pain and pain management  - Manage/alleviate anxiety  - Utilize distraction and/or relaxation techniques  - Monitor for opioid side effects  - Notify MD/LIP if interventions unsuccessful or  patient reports new pain  - Anticipate increased pain with activity and pre-medicate as appropriate  Outcome: Progressing     Problem: SAFETY ADULT - FALL  Goal: Free from fall injury  Description: INTERVENTIONS:  - Assess pt frequently for physical needs  - Identify cognitive and physical deficits and behaviors that affect risk of falls.  - [place] fall precautions as indicated by assessment.  - Educate pt/family on patient safety including physical limitations  - Instruct pt to call for assistance with activity based on assessment  - Modify environment to reduce risk of injury  - Provide assistive devices as appropriate  - Consider OT/PT consult to assist with strengthening/mobility  - Encourage toileting schedule  Outcome: Progressing     Problem: SKIN/TISSUE INTEGRITY - ADULT  Goal: Skin integrity remains intact  Description: INTERVENTIONS  - Assess and document risk factors for pressure ulcer development  - Assess and document skin integrity  - Monitor for areas of redness and/or skin breakdown  - Initiate interventions, skin care algorithm/standards of care as needed  Outcome: Progressing     Problem: NEUROLOGICAL - ADULT  Goal: Achieves maximal functionality and self care  Description: INTERVENTIONS  - Monitor swallowing and airway patency with patient fatigue and changes in neurological status  - Encourage and assist patient to increase activity and self care with guidance from PT/OT  - Encourage visually impaired, hearing impaired and aphasic patients to use assistive/communication devices  Outcome: Progressing     Problem: Impaired Functional Mobility  Goal: Achieve highest/safest level of mobility/gait  Description: Interventions:  - Assess patient's functional ability and stability  - Promote increasing activity/tolerance for mobility and gait  - Educate and engage patient/family in tolerated activity level and precautions  Outcome: Progressing     Problem: Impaired Activities of Daily Living  Goal:  Achieve highest/safest level of independence in self care  Description: Interventions:  - Assess ability and encourage patient to participate in ADLs to maximize function  - Promote sitting position while performing ADLs such as feeding, grooming, and bathing  - Educate and encourage patient/family in tolerated functional activity level and precautions during self-care  Outcome: Progressing     Problem: Impaired Swallowing  Goal: Minimize aspiration risk  Description: Interventions:  - Patient should be alert and upright for all feedings (90 degrees preferred)  - Offer food and liquids at a slow rate  - No straws  - Encourage small bites of food and small sips of liquid  - Offer pills one at a time, crush or deliver with applesauce as needed  - Discontinue feeding and notify MD (or speech pathologist) if coughing or persistent throat clearing or wet/gurgly vocal quality is noted  Outcome: Progressing     Problem: Impaired Cognition  Goal: Patient will exhibit improved attention, thought processing and/or memory  Description: Interventions:  Outcome: Progressing     Problem: Impaired Communication  Goal: Patient will achieve maximal communication potential  Description: Interventions:  Outcome: Progressing     Problem: Patient/Family Goals  Goal: Patient/Family Long Term Goal  Description: Patient's Long Term Goal: comfort    Interventions:  - Follow POC  - meds as ordered  - See additional Care Plan goals for specific interventions  Outcome: Progressing  Goal: Patient/Family Short Term Goal  Description: Patient's Short Term Goal: improved agitation    Interventions:   - follow POC  - meds as ordered  - PRN meds as needed  - See additional Care Plan goals for specific interventions  Outcome: Progressing     Problem: Delirium  Goal: Minimize duration of delirium  Description: Interventions:  - Encourage use of hearing aids, eye glasses  - Promote highest level of mobility daily  - Provide frequent reorientation  -  Promote wakefulness i.e. lights on, blinds open  - Promote sleep, encourage patient's normal rest cycle i.e. lights off, TV off, minimize noise and interruptions  - Encourage family to assist in orientation and promotion of home routines  Outcome: Progressing

## 2025-07-06 NOTE — HOSPICE RN NOTE
[facility] Nursing Rounds:     Assessed patient at bedside, family not present, will reach out via telephone. Patient is minimally responsive to verbal and tactile stimulus. Low level groaning of disapproving quality noted after several attempts to speak to patient. Frequently fidgeting with bilateral arms to face, hair and stroking side of bed. Tremors ongoing with intermittent agitation overnight per nursing staff. RR 12, shallow and irregular, diminished breath sounds bilaterally on room air. LBM: 7/6/25. Bowel sounds hypoactive in all quadrants. Bed confined. Remains in right fetal position throughout assessment.   PPS: 10 %    Reviewed symptom management over the past 24 hours, PRN Medications given include: IV Morphine 1mg x1 for dyspnea/pain, IV Ativan 2mg x2 for agitation. Trialing PO Ativan 2mg q8H for terminal agitation given 3 times with mildly positive response. Newly ordered PO liquid Morphine 5mg q4H for dyspnea/pain. Re-education and discussion with floor RN and NADEEN Hopkins regarding importance of administration of comfort medications and goal to transition to lower level of care pending symptom management through oral trial response.     Patient receiving general inpatient hospice care for symptom management of dyspnea, pain and agitation requiring frequent nursing assessments and interventions including the administration of IV medications  per Dr. Ivy Neumann and Dr. Sonny Jacome. Potential discharge to [facility] on hospice pending agitation, dyspnea and pain interventions. Conversation with NADEEN Hopkins regarding daily assessment of GIP eligibility, to ensure treatment of symptoms and management of care is done at an appropriate level of care and plan to transition patient to lower level of care in next 24-48 hours. POC discussed with NADEEN Hopkins and Sultana Schwartz RN. Both in agreement. [facility] will continue to follow this patient closely for end of life while  supporting patient and family.    Hospice RN Second Rounds: POC to address dyspnea, agitation and pain reviewed with Sandeep SENIOR and with DIVINA Weinberg who are in agreement with increase in Oral Roxinol from 5mg up to 10mg q4H for ongoing dyspnea, RR 24 with occasional moaning and accessory muscle use noted, unproductive cough, frequently fidgeting with left arm. Furrowed brow, frown, left facing fetal position.       Jeanne HALLN, RN, CHPN  Transitional Nurse Liaison  [facility]  [phone] (After-hours)

## 2025-07-06 NOTE — PROGRESS NOTES
[facility] Hospitalist Progress Note     CC: Hospital Follow up    PCP: Jimbo Fowler MD       Assessment/Plan:     Active Problems:    Cerebral atherosclerosis        Mr. Shoemaker is a 89 year old male with PMH advanced dementia with history of behavioral disorders and psychosis, hypotension recently presented for profound dehydration and sepsis requiring pressors and significant acute kidney injury and IV fluids ultimately also developed hypoxia and sepsis secondary to urinary tract infection and chronic urinary retention patient was treated medically but prognosis remain guarded ultimately patient was admitted to inpatient hospice.  Comfort care ongoing.         Acute inpatient hospice secondary to advanced dementia and moderate protein calorie malnutrition with behavioral disturbances  -Comfort care ongoing  -Disposition planning per hospice team  - Patient appears lethargic today  Comfortable however  Reasonable to stop IV fluids defer to hospice team    DNR comfort confirmed with son     FN:  - IVF: none  - Diet: General Diet    Lines: Peripheral IV  Dispo: inpatient hospice, may need to go to subacute rehab for hospice    Questions/concerns were discussed with patient and/or family by bedside.    Ivy Neumann MD  Hospitalist with [facility]     Subjective:     No new events overnight. Sleepy today. Agitated overnight    OBJECTIVE:    Blood pressure (!) 85/34, pulse 79, temperature 99.3 °F (37.4 °C), temperature source Axillary, resp. rate 12, SpO2 (!) 79%.    Temp:  [98.1 °F (36.7 °C)-99.3 °F (37.4 °C)] 99.3 °F (37.4 °C)  Pulse:  [60-79] 79  Resp:  [12] 12  BP: (85-98)/(34-49) 85/34  SpO2:  [77 %-79 %] 79 %      Intake/Output:    Intake/Output Summary (Last 24 hours) at 7/6/2025 1141  Last data filed at 7/6/2025 0645  Gross per 24 hour   Intake --   Output 850 ml   Net -850 ml       Last 3 Weights   07/02/25 0611 131 lb 9.6 oz (59.7 kg)   07/01/25 0600 154 lb 9.6 oz (70.1 kg)    06/30/25 0900 150 lb 3.2 oz (68.1 kg)   06/29/25 1800 145 lb (65.8 kg)   08/02/23 1319 111 lb 8 oz (50.6 kg)   07/20/23 0019 117 lb 4.8 oz (53.2 kg)   07/19/23 2345 117 lb 4.8 oz (53.2 kg)   07/18/23 1039 126 lb 6.4 oz (57.3 kg)   07/03/23 1958 141 lb 8.6 oz (64.2 kg)   07/03/23 1357 145 lb (65.8 kg)       Exam   Gen: Lethargic laying in bed appears comfortable  Pulm: Lungs clear  CV: Heart with regular rate       Data Review:       Labs:     Recent Labs   Lab 06/30/25  0516 07/01/25  0608 07/02/25  1008   RBC 4.15 3.81  3.81 4.32   HGB 12.4* 11.3*  11.3* 13.1   HCT 41.9 37.8*  37.8* 45.2   .0* 99.2  99.2 104.6*   MCH 29.9 29.7  29.7 30.3   MCHC 29.6* 29.9*  29.9* 29.0*   RDW 13.7 13.3  13.3 12.9   NEPRELIM 8.62* 6.51  --    WBC 11.7* 9.4  9.4 7.1   .0 195.0  195.0 166.0         Recent Labs   Lab 07/01/25  2342 07/02/25  0555 07/02/25  1008   * 115* 115*   BUN 13 14 9   CREATSERUM 0.99 1.00 0.93   EGFRCR 72 71 78   CA 8.4* 8.8 8.6*   * 151* 149*   K 3.8 3.9  3.9 4.1   * 118* 119*   CO2 24.0 24.0 21.0       No results for input(s): \"ALT\", \"AST\", \"ALB\", \"AMYLASE\", \"LIPASE\", \"LDH\" in the last 168 hours.    Invalid input(s): \"ALPHOS\", \"TBIL\", \"DBIL\", \"TPROT\"      Imaging:  No results found.      Meds:     Scheduled Medications[1]  Medication Infusions[2]  PRN Medications[3]           [1]    LORazepam Intensol  2 mg Oral Q8H   [2] [3]   OLANZapine    polyethylene glycol (PEG 3350)    sodium chloride 0.9%    morphINE    morphINE    haloperidol lactate **OR** haloperidol lactate    LORazepam    [DISCONTINUED] LORazepam **OR** LORazepam **OR** LORazepam    scopolamine    glycopyrrolate    ondansetron **OR** ondansetron

## 2025-07-06 NOTE — PLAN OF CARE
Patient is lethargic, and minimally responsive. Unable to follow commands. Can be restless at times - ativan scheduled along with roxinol for comfort. Junior in place.  Frequent repositioning and oral care provided as tolerated. Comfort measures in place. Appropriate safety measures maintained; bed alarm and safety cam for added safety.     Problem: Risk for Violence/Aggression  Goal: Absence of Violence/Aggression  Description: INTERVENTIONS:   - Identify precipitating factors for behavior   - Notify Charge RN/Provider   - Consider decreasing stimulation   - Consider distraction measures   - Consider discussion with provider regarding prn meds    - Consider URMILA (Moderate Risk only)   - Consider Code Support (High Risk only)   - Consider room safety checks   - Consider restraints  Outcome: Progressing     Problem: PAIN - ADULT  Goal: Verbalizes/displays adequate comfort level or patient's stated pain goal  Description: INTERVENTIONS:  - Encourage pt to monitor pain and request assistance  - Assess pain using appropriate pain scale  - Administer analgesics based on type and severity of pain and evaluate response  - Implement non-pharmacological measures as appropriate and evaluate response  - Consider cultural and social influences on pain and pain management  - Manage/alleviate anxiety  - Utilize distraction and/or relaxation techniques  - Monitor for opioid side effects  - Notify MD/LIP if interventions unsuccessful or patient reports new pain  - Anticipate increased pain with activity and pre-medicate as appropriate  Outcome: Progressing     Problem: SAFETY ADULT - FALL  Goal: Free from fall injury  Description: INTERVENTIONS:  - Assess pt frequently for physical needs  - Identify cognitive and physical deficits and behaviors that affect risk of falls.  - [place] fall precautions as indicated by assessment.  - Educate pt/family on patient safety including physical limitations  - Instruct pt to call for  assistance with activity based on assessment  - Modify environment to reduce risk of injury  - Provide assistive devices as appropriate  - Consider OT/PT consult to assist with strengthening/mobility  - Encourage toileting schedule  Outcome: Progressing     Problem: SKIN/TISSUE INTEGRITY - ADULT  Goal: Skin integrity remains intact  Description: INTERVENTIONS  - Assess and document risk factors for pressure ulcer development  - Assess and document skin integrity  - Monitor for areas of redness and/or skin breakdown  - Initiate interventions, skin care algorithm/standards of care as needed  Outcome: Progressing     Problem: Patient/Family Goals  Goal: Patient/Family Long Term Goal  Description: Patient's Long Term Goal: comfort    Interventions:  - Follow POC  - meds as ordered  - See additional Care Plan goals for specific interventions  Outcome: Progressing  Goal: Patient/Family Short Term Goal  Description: Patient's Short Term Goal: improved agitation    Interventions:   - follow POC  - meds as ordered  - PRN meds as needed  - See additional Care Plan goals for specific interventions  Outcome: Progressing

## 2025-07-07 ENCOUNTER — APPOINTMENT (OUTPATIENT)
Dept: PICC SERVICES | Facility: HOSPITAL | Age: OVER 89
End: 2025-07-07
Attending: HOSPITALIST

## 2025-07-07 NOTE — CM/SW NOTE
LSW visit to pt with DIVINA Shine. Pt was lying in bed with eyes closed and was audibly moaning throughout visit. Pt also presented with a furrowed brow. No family was present at bedside. LSW provided supportive presence to pt. LSW and RN made follow up phone call to pt's son Jorge L. Jorge L is flying to [facility] and should be at the hospital in the morning. LSW explained to pt's son that due to pt's uncontrolled symptoms, he still remains eligible for inpatient hospice. LSW discussed plan to transfer pt to routine level of hospice care at [facility] if pt's symptoms become managed. Pt's son Jorge L in agreement with this plan. LSW will continue to follow up with pt and family to provide support. Pt's POC discussed with DIVINA Walker.       Joseph Bullard, MANJINDER  [facility], [place]  [id number]

## 2025-07-07 NOTE — HOSPICE RN NOTE
[facility] Inpatient Nursing Rounds:    Hospice RN and MSW saw patient bedside without family present. Patient abdominal breathing, irregular, shallow, RR- 17. Patient started moaning aloud during assessment, and fidgeting, with furrowed brow. Recommended a PRN dose of oral Morphine, and if the patient is still moaning then IVP.      Morphine oral scheduled  x 4, Ativan oral x 3, Ativan IVP X 1, Morphine oral PRN all in the past 24 hours.     PPS: 10%    Patient remains eligible for general inpatient hospice care for symptom management of pain, agitation requiring frequent nursing assessments and interventions including titration of IV medications. POC discussed with family and Denise MURCIA. All are in agreement. [facility] will continue to support the patient and family.     Fátima Gong RN, [facility]  [facility] RN Liaison  [phone] (After-hours)

## 2025-07-07 NOTE — PLAN OF CARE
Problem: Risk for Violence/Aggression  Goal: Absence of Violence/Aggression  Description: INTERVENTIONS:   - Identify precipitating factors for behavior   - Notify Charge RN/Provider   - Consider decreasing stimulation   - Consider distraction measures   - Consider discussion with provider regarding prn meds    - Consider URMILA (Moderate Risk only)   - Consider Code Support (High Risk only)   - Consider room safety checks   - Consider restraints  Outcome: Progressing     Problem: PAIN - ADULT  Goal: Verbalizes/displays adequate comfort level or patient's stated pain goal  Description: INTERVENTIONS:  - Encourage pt to monitor pain and request assistance  - Assess pain using appropriate pain scale  - Administer analgesics based on type and severity of pain and evaluate response  - Implement non-pharmacological measures as appropriate and evaluate response  - Consider cultural and social influences on pain and pain management  - Manage/alleviate anxiety  - Utilize distraction and/or relaxation techniques  - Monitor for opioid side effects  - Notify MD/LIP if interventions unsuccessful or patient reports new pain  - Anticipate increased pain with activity and pre-medicate as appropriate  Outcome: Progressing     Problem: SAFETY ADULT - FALL  Goal: Free from fall injury  Description: INTERVENTIONS:  - Assess pt frequently for physical needs  - Identify cognitive and physical deficits and behaviors that affect risk of falls.  - [place] fall precautions as indicated by assessment.  - Educate pt/family on patient safety including physical limitations  - Instruct pt to call for assistance with activity based on assessment  - Modify environment to reduce risk of injury  - Provide assistive devices as appropriate  - Consider OT/PT consult to assist with strengthening/mobility  - Encourage toileting schedule  Outcome: Progressing     Problem: SKIN/TISSUE INTEGRITY - ADULT  Goal: Skin integrity remains intact  Description:  INTERVENTIONS  - Assess and document risk factors for pressure ulcer development  - Assess and document skin integrity  - Monitor for areas of redness and/or skin breakdown  - Initiate interventions, skin care algorithm/standards of care as needed  Outcome: Progressing     Problem: Impaired Swallowing  Goal: Minimize aspiration risk  Description: Interventions:  - Patient should be alert and upright for all feedings (90 degrees preferred)  - Offer food and liquids at a slow rate  - No straws  - Encourage small bites of food and small sips of liquid  - Offer pills one at a time, crush or deliver with applesauce as needed  - Discontinue feeding and notify MD (or speech pathologist) if coughing or persistent throat clearing or wet/gurgly vocal quality is noted  Outcome: Progressing     Problem: Patient/Family Goals  Goal: Patient/Family Long Term Goal  Description: Patient's Long Term Goal: comfort    Interventions:  - Follow POC  - meds as ordered  - See additional Care Plan goals for specific interventions  Outcome: Progressing  Goal: Patient/Family Short Term Goal  Description: Patient's Short Term Goal: improved agitation    Interventions:   - follow POC  - meds as ordered  - PRN meds as needed  - See additional Care Plan goals for specific interventions  Outcome: Progressing     Patient is minimally responsive, lethargic, on RA, x2 max assist, saline locked, scheduled Ativan and PO Morphine for restlessness/agitation, pinzon in place, x1 BM overnight, no acute changes overnight. Frequent rounding done, safety measures in place, comfort care continued.

## 2025-07-07 NOTE — PROGRESS NOTES
[facility] Hospitalist Progress Note     CC: Hospital Follow up    PCP: Jimbo Fowler MD       Assessment/Plan:     Active Problems:    Cerebral atherosclerosis    Mr. Shoemaker is a 89 year old male with PMH advanced dementia with history of behavioral disorders and psychosis, hypotension recently presented for profound dehydration and sepsis requiring pressors and significant acute kidney injury and IV fluids ultimately also developed hypoxia and sepsis secondary to urinary tract infection and chronic urinary retention patient was treated medically but prognosis remain guarded ultimately patient was admitted to inpatient hospice.  Comfort care ongoing.      Acute inpatient hospice secondary to advanced dementia and moderate protein calorie malnutrition with behavioral disturbances  -Comfort care ongoing  -Disposition planning per hospice team  - Patient appears lethargic today  Comfortable however  Reasonable to stop IV fluids defer to hospice team    DNR comfort confirmed with son     FN:  - IVF: none  - Diet: General Diet    Lines: Peripheral IV  Dispo: inpatient hospice, may need to go to subacute rehab for hospice    Questions/concerns were discussed with patient and/or family by bedside.    Ivy Neumann MD  Hospitalist with [facility]     Subjective:     Lost IV access, getting scheduled PO morphine.     OBJECTIVE:    Blood pressure 94/50, pulse 65, temperature 97.6 °F (36.4 °C), temperature source Axillary, resp. rate 12, SpO2 92%.    Temp:  [97.6 °F (36.4 °C)-99.6 °F (37.6 °C)] 97.6 °F (36.4 °C)  Pulse:  [65-97] 65  Resp:  [12-14] 12  BP: ()/(50-83) 94/50  SpO2:  [92 %-93 %] 92 %      Intake/Output:    Intake/Output Summary (Last 24 hours) at 7/7/2025 1215  Last data filed at 7/6/2025 2014  Gross per 24 hour   Intake 0 ml   Output 125 ml   Net -125 ml       Last 3 Weights   07/02/25 0611 131 lb 9.6 oz (59.7 kg)   07/01/25 0600 154 lb 9.6 oz (70.1 kg)   06/30/25 0900 150 lb  3.2 oz (68.1 kg)   06/29/25 1800 145 lb (65.8 kg)   08/02/23 1319 111 lb 8 oz (50.6 kg)   07/20/23 0019 117 lb 4.8 oz (53.2 kg)   07/19/23 2345 117 lb 4.8 oz (53.2 kg)   07/18/23 1039 126 lb 6.4 oz (57.3 kg)   07/03/23 1958 141 lb 8.6 oz (64.2 kg)   07/03/23 1357 145 lb (65.8 kg)       Exam   Gen: Lethargic laying in bed appears comfortable  Pulm: Lungs clear  CV: Heart with regular rate       Data Review:       Labs:     Recent Labs   Lab 07/01/25  0608 07/02/25  1008   RBC 3.81  3.81 4.32   HGB 11.3*  11.3* 13.1   HCT 37.8*  37.8* 45.2   MCV 99.2  99.2 104.6*   MCH 29.7  29.7 30.3   MCHC 29.9*  29.9* 29.0*   RDW 13.3  13.3 12.9   NEPRELIM 6.51  --    WBC 9.4  9.4 7.1   .0  195.0 166.0         Recent Labs   Lab 07/01/25  2342 07/02/25  0555 07/02/25  1008   * 115* 115*   BUN 13 14 9   CREATSERUM 0.99 1.00 0.93   EGFRCR 72 71 78   CA 8.4* 8.8 8.6*   * 151* 149*   K 3.8 3.9  3.9 4.1   * 118* 119*   CO2 24.0 24.0 21.0       No results for input(s): \"ALT\", \"AST\", \"ALB\", \"AMYLASE\", \"LIPASE\", \"LDH\" in the last 168 hours.    Invalid input(s): \"ALPHOS\", \"TBIL\", \"DBIL\", \"TPROT\"      Imaging:  No results found.      Meds:     Scheduled Medications[1]  Medication Infusions[2]  PRN Medications[3]           [1]    morphINE  10 mg Oral q4h    LORazepam Intensol  2 mg Oral Q8H   [2] [3]   OLANZapine    polyethylene glycol (PEG 3350)    sodium chloride 0.9%    morphINE    morphINE    haloperidol lactate **OR** haloperidol lactate    LORazepam    [DISCONTINUED] LORazepam **OR** LORazepam **OR** LORazepam    scopolamine    glycopyrrolate    ondansetron **OR** ondansetron

## 2025-07-08 NOTE — PROGRESS NOTES
[facility] Hospitalist Progress Note     CC: Hospital Follow up    PCP: Jimbo Fowler MD       Assessment/Plan:     Active Problems:    Cerebral atherosclerosis    Mr. Shoemaker is a 89 year old male with PMH advanced dementia with history of behavioral disorders and psychosis, hypotension recently presented for profound dehydration and sepsis requiring pressors and significant acute kidney injury and IV fluids ultimately also developed hypoxia and sepsis secondary to urinary tract infection and chronic urinary retention patient was treated medically but prognosis remain guarded ultimately patient was admitted to inpatient hospice.  Comfort care ongoing.      Acute inpatient hospice secondary to advanced dementia and moderate protein calorie malnutrition with behavioral disturbances  -Comfort care ongoing  -Disposition planning per hospice team  - Patient appears lethargic today  Comfortable however  Reasonable to stop IV fluids defer to hospice team    DNR comfort confirmed with son     FN:  - IVF: none  - Diet: General Diet    Lines: Peripheral IV  Dispo: inpatient hospice, may need to go to subacute rehab for hospice    Questions/concerns were discussed with patient and/or family by bedside.    Ivy Neumann MD  Hospitalist with [facility]     Subjective:     Moaning. Son at bedside    OBJECTIVE:    Blood pressure 103/65, pulse 80, temperature 98.7 °F (37.1 °C), temperature source Axillary, resp. rate 10, SpO2 (!) 88%.    Temp:  [98.7 °F (37.1 °C)-99.8 °F (37.7 °C)] 98.7 °F (37.1 °C)  Pulse:  [80-85] 80  Resp:  [10-12] 10  BP: ()/(53-65) 103/65  SpO2:  [86 %-88 %] 88 %      Intake/Output:    Intake/Output Summary (Last 24 hours) at 7/8/2025 1102  Last data filed at 7/8/2025 0752  Gross per 24 hour   Intake 122.95 ml   Output 650 ml   Net -527.05 ml       Last 3 Weights   07/02/25 0611 131 lb 9.6 oz (59.7 kg)   07/01/25 0600 154 lb 9.6 oz (70.1 kg)   06/30/25 0900 150 lb 3.2 oz (68.1  kg)   06/29/25 1800 145 lb (65.8 kg)   08/02/23 1319 111 lb 8 oz (50.6 kg)   07/20/23 0019 117 lb 4.8 oz (53.2 kg)   07/19/23 2345 117 lb 4.8 oz (53.2 kg)   07/18/23 1039 126 lb 6.4 oz (57.3 kg)   07/03/23 1958 141 lb 8.6 oz (64.2 kg)   07/03/23 1357 145 lb (65.8 kg)       Exam   Gen: Lethargic laying in bed, moaning  Pulm: Lungs clear  CV: Heart with regular rate       Data Review:       Labs:     Recent Labs   Lab 07/02/25  1008   RBC 4.32   HGB 13.1   HCT 45.2   .6*   MCH 30.3   MCHC 29.0*   RDW 12.9   WBC 7.1   .0         Recent Labs   Lab 07/01/25  2342 07/02/25  0555 07/02/25  1008   * 115* 115*   BUN 13 14 9   CREATSERUM 0.99 1.00 0.93   EGFRCR 72 71 78   CA 8.4* 8.8 8.6*   * 151* 149*   K 3.8 3.9  3.9 4.1   * 118* 119*   CO2 24.0 24.0 21.0       No results for input(s): \"ALT\", \"AST\", \"ALB\", \"AMYLASE\", \"LIPASE\", \"LDH\" in the last 168 hours.    Invalid input(s): \"ALPHOS\", \"TBIL\", \"DBIL\", \"TPROT\"      Imaging:  No results found.      Meds:     Scheduled Medications[1]  Medication Infusions[2]  PRN Medications[3]           [1]    scopolamine  1 patch Transdermal Q72H   [2]    morphINE in sodium chloride 0.9% 4 mg/hr (07/08/25 1038)   [3]   bisacodyl    [DISCONTINUED] LORazepam **OR** LORazepam **OR** LORazepam    morphINE in sodium chloride 0.9%    morphINE **OR** morphINE    acetaminophen    OLANZapine    sodium chloride 0.9%    morphINE    haloperidol lactate **OR** haloperidol lactate    glycopyrrolate    ondansetron **OR** ondansetron

## 2025-07-08 NOTE — PLAN OF CARE
End of life care, comfort measures continued. Continuous morphine 2 mg/hr for pain. Oral care as needed. Fall risk interventions in place. Frequent rounding.    Problem: Risk for Violence/Aggression  Goal: Absence of Violence/Aggression  Description: INTERVENTIONS:   - Identify precipitating factors for behavior   - Notify Charge RN/Provider   - Consider decreasing stimulation   - Consider distraction measures   - Consider discussion with provider regarding prn meds    - Consider URMILA (Moderate Risk only)   - Consider Code Support (High Risk only)   - Consider room safety checks   - Consider restraints  Outcome: Progressing     Problem: PAIN - ADULT  Goal: Verbalizes/displays adequate comfort level or patient's stated pain goal  Description: INTERVENTIONS:  - Encourage pt to monitor pain and request assistance  - Assess pain using appropriate pain scale  - Administer analgesics based on type and severity of pain and evaluate response  - Implement non-pharmacological measures as appropriate and evaluate response  - Consider cultural and social influences on pain and pain management  - Manage/alleviate anxiety  - Utilize distraction and/or relaxation techniques  - Monitor for opioid side effects  - Notify MD/LIP if interventions unsuccessful or patient reports new pain  - Anticipate increased pain with activity and pre-medicate as appropriate  Outcome: Progressing     Problem: SAFETY ADULT - FALL  Goal: Free from fall injury  Description: INTERVENTIONS:  - Assess pt frequently for physical needs  - Identify cognitive and physical deficits and behaviors that affect risk of falls.  - [place] fall precautions as indicated by assessment.  - Educate pt/family on patient safety including physical limitations  - Instruct pt to call for assistance with activity based on assessment  - Modify environment to reduce risk of injury  - Provide assistive devices as appropriate  - Consider OT/PT consult to assist with  strengthening/mobility  - Encourage toileting schedule  Outcome: Progressing     Problem: SKIN/TISSUE INTEGRITY - ADULT  Goal: Skin integrity remains intact  Description: INTERVENTIONS  - Assess and document risk factors for pressure ulcer development  - Assess and document skin integrity  - Monitor for areas of redness and/or skin breakdown  - Initiate interventions, skin care algorithm/standards of care as needed  Outcome: Progressing     Problem: Impaired Swallowing  Goal: Minimize aspiration risk  Description: Interventions:  - Patient should be alert and upright for all feedings (90 degrees preferred)  - Offer food and liquids at a slow rate  - No straws  - Encourage small bites of food and small sips of liquid  - Offer pills one at a time, crush or deliver with applesauce as needed  - Discontinue feeding and notify MD (or speech pathologist) if coughing or persistent throat clearing or wet/gurgly vocal quality is noted  Outcome: Progressing     Problem: Patient/Family Goals  Goal: Patient/Family Long Term Goal  Description: Patient's Long Term Goal: comfort    Interventions:  - Follow POC  - meds as ordered  - See additional Care Plan goals for specific interventions  Outcome: Progressing  Goal: Patient/Family Short Term Goal  Description: Patient's Short Term Goal: improved agitation    Interventions:   - follow POC  - meds as ordered  - PRN meds as needed  - See additional Care Plan goals for specific interventions  Outcome: Progressing

## 2025-07-08 NOTE — PLAN OF CARE
Patient is somnolent. Morphine gtt @5mg/hr. PRN morphine bolus for pain control. Ativan provided for agitation. Comfort care measures, frequent rounding provided. Safety measures in place.     Problem: PAIN - ADULT  Goal: Verbalizes/displays adequate comfort level or patient's stated pain goal  Description: INTERVENTIONS:  - Encourage pt to monitor pain and request assistance  - Assess pain using appropriate pain scale  - Administer analgesics based on type and severity of pain and evaluate response  - Implement non-pharmacological measures as appropriate and evaluate response  - Consider cultural and social influences on pain and pain management  - Manage/alleviate anxiety  - Utilize distraction and/or relaxation techniques  - Monitor for opioid side effects  - Notify MD/LIP if interventions unsuccessful or patient reports new pain  - Anticipate increased pain with activity and pre-medicate as appropriate  Outcome: Progressing

## 2025-07-08 NOTE — PLAN OF CARE
Patient is on [facility]osp[facility] in patient care. Morphine gtt at 1mg/hr. Ativan PRN for agitation administered. Frequent rounding and safety measures in place.   Problem: PAIN - ADULT  Goal: Verbalizes/displays adequate comfort level or patient's stated pain goal  Description: INTERVENTIONS:  - Encourage pt to monitor pain and request assistance  - Assess pain using appropriate pain scale  - Administer analgesics based on type and severity of pain and evaluate response  - Implement non-pharmacological measures as appropriate and evaluate response  - Consider cultural and social influences on pain and pain management  - Manage/alleviate anxiety  - Utilize distraction and/or relaxation techniques  - Monitor for opioid side effects  - Notify MD/LIP if interventions unsuccessful or patient reports new pain  - Anticipate increased pain with activity and pre-medicate as appropriate  Outcome: Progressing

## 2025-07-08 NOTE — HOSPICE RN NOTE
[facility] Inpatient Nursing Rounds:     Assessed patient at bedside, family present along with Hospice LSW Joseph, patient is unresponsive to verbal and tactile stimulus. Frequent, inconsolable moaning heard from hallway and continued throughout assessment. Appears agitated and uncomfortable at this time. RR 10, irregular, labored with accessory muscle use on room air with several apneic pauses of approximately 5 seconds. Bilateral lower extremities cool to touch with mottling noted. LBM: 7/7. Bowel sounds hypoactive in all quadrants. Bed confined.  PPS: 10 %    Morphine drip running at 3 mg/hr at time of assessment, spoke with floor RN regarding available prn dose of IVP Ativan and titration of Morphine gtt to help ease labored breathing. RN in agreement with this plan. Reviewed symptom management over the past 24 hours: Morphine infusion initiated and increased to 3 mg/hour incrementally over shift for pain/dyspnea. PRN Medications given include: IVP Morphine 2mg x 1 pain/dyspnea, IVP Morphine 1mgx2 for pain/dyspnea, PO Morphine 10mgx1 pain/dyspnea, IVP Ativan 1mgx2 for agitation. Trial of Oral Morphine and Oral Ativan not successful with severity of patient symptoms requiring IV administration and continuous drip of Morphine, scheduled PO Morphine and Ativan discontinued by Dr Neumann yesterday 7/7 evening.     Patient receiving general inpatient hospice care for symptom management of pain, dyspnea and agitation requiring frequent nursing assessments and interventions including the administration and titration of IV medications  per Dr. Ivy Neumann and Dr. Sonny Jacome. Potential discharge to LTC on hospice pending baseline pain, dyspnea and agitation intervention. Possible CADD pump at discharge. End of Life education provided along with discussion with family regarding daily assessment of GIP eligibility, to ensure treatment of symptoms and management of care is done at an appropriate level  of care. POC discussed with son Sandeep SENIOR, MAUREEN Macario and Denise MURCIA. All in agreement. [facility] will continue to follow this patient closely for end of life while supporting patient and family.    Addendum: PRN IVP Ativan frequency changed from every 4 hours to every 2 hours to account for patient ongoing agitation and discomfort.     Hospice RN Second Rounds: POC to address dyspnea, pain and agitation reviewed with DIVINA Walker who are in agreement with   IVP 2mg MS dose now. Patient moaning ongoing, labored breathing, and when personal care was performed this afternoon patient made load groans of disapproving quality. Since morning rounds, Morphine drip was titrated up to 5mg/hr up from 4mg/hr and PRN IVP Ativan 1mgx2 given for agitation, PRN IVP Morphine 2mgx2 given for pain/dyspnea and scopolamine patch applied for secretions.       Jeanne Yeboah BSN, RN, CHPN  Transitional Nurse Liaison  [facility]  [phone] (After-hours)

## 2025-07-09 NOTE — HOSPICE RN NOTE
Son Sandeep returning [facility] phone call. Update given over phone, all questions addressed.       James Wesley RN BSN  [facility]  Transitional Nurse Liaison  [phone] / [phone] (after hours)

## 2025-07-09 NOTE — PLAN OF CARE
Problem: Risk for Violence/Aggression  Goal: Absence of Violence/Aggression  Description: INTERVENTIONS:   - Identify precipitating factors for behavior   - Notify Charge RN/Provider   - Consider decreasing stimulation   - Consider distraction measures   - Consider discussion with provider regarding prn meds    - Consider URMILA (Moderate Risk only)   - Consider Code Support (High Risk only)   - Consider room safety checks   - Consider restraints  Outcome: Progressing     Problem: PAIN - ADULT  Goal: Verbalizes/displays adequate comfort level or patient's stated pain goal  Description: INTERVENTIONS:  - Encourage pt to monitor pain and request assistance  - Assess pain using appropriate pain scale  - Administer analgesics based on type and severity of pain and evaluate response  - Implement non-pharmacological measures as appropriate and evaluate response  - Consider cultural and social influences on pain and pain management  - Manage/alleviate anxiety  - Utilize distraction and/or relaxation techniques  - Monitor for opioid side effects  - Notify MD/LIP if interventions unsuccessful or patient reports new pain  - Anticipate increased pain with activity and pre-medicate as appropriate  Outcome: Progressing     Problem: SAFETY ADULT - FALL  Goal: Free from fall injury  Description: INTERVENTIONS:  - Assess pt frequently for physical needs  - Identify cognitive and physical deficits and behaviors that affect risk of falls.  - [place] fall precautions as indicated by assessment.  - Educate pt/family on patient safety including physical limitations  - Instruct pt to call for assistance with activity based on assessment  - Modify environment to reduce risk of injury  - Provide assistive devices as appropriate  - Consider OT/PT consult to assist with strengthening/mobility  - Encourage toileting schedule  Outcome: Progressing     Problem: SKIN/TISSUE INTEGRITY - ADULT  Goal: Skin integrity remains intact  Description:  INTERVENTIONS  - Assess and document risk factors for pressure ulcer development  - Assess and document skin integrity  - Monitor for areas of redness and/or skin breakdown  - Initiate interventions, skin care algorithm/standards of care as needed  Outcome: Progressing     Problem: Impaired Swallowing  Goal: Minimize aspiration risk  Description: Interventions:  - Patient should be alert and upright for all feedings (90 degrees preferred)  - Offer food and liquids at a slow rate  - No straws  - Encourage small bites of food and small sips of liquid  - Offer pills one at a time, crush or deliver with applesauce as needed  - Discontinue feeding and notify MD (or speech pathologist) if coughing or persistent throat clearing or wet/gurgly vocal quality is noted  Outcome: Progressing     Problem: Patient/Family Goals  Goal: Patient/Family Long Term Goal  Description: Patient's Long Term Goal: comfort    Interventions:  - Follow POC  - meds as ordered  - See additional Care Plan goals for specific interventions  Outcome: Progressing  Goal: Patient/Family Short Term Goal  Description: Patient's Short Term Goal: improved agitation    Interventions:   - follow POC  - meds as ordered  - PRN meds as needed  - See additional Care Plan goals for specific interventions  Outcome: Progressing

## 2025-07-09 NOTE — PROGRESS NOTES
[facility] Hospitalist Progress Note     CC: Hospital Follow up    PCP: Jimbo Fowler MD       Assessment/Plan:     Active Problems:    Cerebral atherosclerosis    Mr. Shoemaker is a 89 year old male with PMH advanced dementia with history of behavioral disorders and psychosis, hypotension recently presented for profound dehydration and sepsis requiring pressors and significant acute kidney injury and IV fluids ultimately also developed hypoxia and sepsis secondary to urinary tract infection and chronic urinary retention patient was treated medically but prognosis remain guarded ultimately patient was admitted to inpatient hospice.  Comfort care ongoing.      Acute inpatient hospice secondary to advanced dementia and moderate protein calorie malnutrition with behavioral disturbances  -Comfort care ongoing  -Disposition planning per hospice team  Comfortable     DNR comfort confirmed with son     FN:  - IVF: none  - Diet: General Diet    Lines: Peripheral IV  Dispo: inpatient hospice, may need to go to subacute rehab for hospice    Questions/concerns were discussed with patient and/or family by bedside.    Ivy Neumann MD  Hospitalist with [facility]     Subjective:     Unresponsive.     OBJECTIVE:    Blood pressure (!) 81/44, pulse 85, temperature 97.9 °F (36.6 °C), temperature source Axillary, resp. rate 12, SpO2 (!) 84%.    Temp:  [97.9 °F (36.6 °C)-103.2 °F (39.6 °C)] 97.9 °F (36.6 °C)  Pulse:  [] 85  Resp:  [12-14] 12  BP: (81-82)/(44-53) 81/44  SpO2:  [81 %-84 %] 84 %      Intake/Output:    Intake/Output Summary (Last 24 hours) at 7/9/2025 1149  Last data filed at 7/9/2025 0712  Gross per 24 hour   Intake 120.75 ml   Output 425 ml   Net -304.25 ml       Last 3 Weights   07/02/25 0611 131 lb 9.6 oz (59.7 kg)   07/01/25 0600 154 lb 9.6 oz (70.1 kg)   06/30/25 0900 150 lb 3.2 oz (68.1 kg)   06/29/25 1800 145 lb (65.8 kg)   08/02/23 1319 111 lb 8 oz (50.6 kg)   07/20/23 0019 117 lb  4.8 oz (53.2 kg)   07/19/23 2345 117 lb 4.8 oz (53.2 kg)   07/18/23 1039 126 lb 6.4 oz (57.3 kg)   07/03/23 1958 141 lb 8.6 oz (64.2 kg)   07/03/23 1357 145 lb (65.8 kg)       Exam   Gen: Lethargic laying in bed, unresponsive  Pulm: Lungs clear  CV: Heart with regular rate       Data Review:       Labs:     No results for input(s): \"RBC\", \"HGB\", \"HCT\", \"MCV\", \"MCH\", \"MCHC\", \"RDW\", \"NEPRELIM\", \"WBC\", \"PLT\" in the last 168 hours.        No results for input(s): \"GLU\", \"BUN\", \"CREATSERUM\", \"GFRAA\", \"GFRNAA\", \"EGFRCR\", \"CA\", \"NA\", \"K\", \"CL\", \"CO2\" in the last 168 hours.      No results for input(s): \"ALT\", \"AST\", \"ALB\", \"AMYLASE\", \"LIPASE\", \"LDH\" in the last 168 hours.    Invalid input(s): \"ALPHOS\", \"TBIL\", \"DBIL\", \"TPROT\"      Imaging:  No results found.      Meds:     Scheduled Medications[1]  Medication Infusions[2]  PRN Medications[3]             [1]    scopolamine  1 patch Transdermal Q72H   [2]    morphINE in sodium chloride 0.9% 6 mg/hr (07/08/25 2332)   [3]   bisacodyl    [DISCONTINUED] LORazepam **OR** LORazepam **OR** LORazepam    morphINE in sodium chloride 0.9%    morphINE **OR** morphINE    acetaminophen    OLANZapine    sodium chloride 0.9%    morphINE    haloperidol lactate **OR** haloperidol lactate    glycopyrrolate    ondansetron **OR** ondansetron

## 2025-07-09 NOTE — HOSPICE RN NOTE
[facility] Inpatient Nursing Rounds:     Hospice RN and MSW visited patient at bedside. No family present. Reached out via telephone, no answer/left voicemail. Patient unresponsive to verbal stimuli, facial twitching to touch. Furrowed brow present. Breathing labored, shallow, irregular, RR 14, diminished in all fields. Apneic pauses of 5-10 seconds present. Skin cool, pale. Indwelling urinary catheter remains in place, draining small amount of wisam colored urine. LBM: 7/8/25. Bowel sounds hypoactive in all quadrants.     Reviewed symptom management over the past 24 hours: Scopolamine patch in place behind left ear for airway secretions. Morphine infusion is continued, in past 24 hours has been increased multiple times, going from 3mg/hr 24 hours ago up to 6mg/hr at this time, for pain and dyspnea. PRN medications given include: Lorazepam 1 mg IVP x1 for agitation.     PPS: 10%     Patient receiving general inpatient hospice care for symptom management of pain, dyspnea, agitation, and airway secretions requiring frequent nursing assessments and interventions including the administration and titration of IV medications per Dr. Neumann and Dr. Jacome. Plan to transfer to routine level of care with CADD pump at [facility], pending symptom management. POC discussed with Danielle Meyers RN, in agreement. [facility] will continue to support the patient and family.    James Wesley RN BSN  [facility]  Transitional Nurse Liaison  [phone] / [phone] (after hours)

## 2025-07-10 NOTE — PLAN OF CARE
Problem: Risk for Violence/Aggression  Goal: Absence of Violence/Aggression  Description: INTERVENTIONS:   - Identify precipitating factors for behavior   - Notify Charge RN/Provider   - Consider decreasing stimulation   - Consider distraction measures   - Consider discussion with provider regarding prn meds    - Consider URMILA (Moderate Risk only)   - Consider Code Support (High Risk only)   - Consider room safety checks   - Consider restraints  Outcome: Progressing     Problem: PAIN - ADULT  Goal: Verbalizes/displays adequate comfort level or patient's stated pain goal  Description: INTERVENTIONS:  - Encourage pt to monitor pain and request assistance  - Assess pain using appropriate pain scale  - Administer analgesics based on type and severity of pain and evaluate response  - Implement non-pharmacological measures as appropriate and evaluate response  - Consider cultural and social influences on pain and pain management  - Manage/alleviate anxiety  - Utilize distraction and/or relaxation techniques  - Monitor for opioid side effects  - Notify MD/LIP if interventions unsuccessful or patient reports new pain  - Anticipate increased pain with activity and pre-medicate as appropriate  Outcome: Progressing     Problem: SAFETY ADULT - FALL  Goal: Free from fall injury  Description: INTERVENTIONS:  - Assess pt frequently for physical needs  - Identify cognitive and physical deficits and behaviors that affect risk of falls.  - [place] fall precautions as indicated by assessment.  - Educate pt/family on patient safety including physical limitations  - Instruct pt to call for assistance with activity based on assessment  - Modify environment to reduce risk of injury  - Provide assistive devices as appropriate  - Consider OT/PT consult to assist with strengthening/mobility  - Encourage toileting schedule  Outcome: Progressing     Problem: SKIN/TISSUE INTEGRITY - ADULT  Goal: Skin integrity remains intact  Description:  INTERVENTIONS  - Assess and document risk factors for pressure ulcer development  - Assess and document skin integrity  - Monitor for areas of redness and/or skin breakdown  - Initiate interventions, skin care algorithm/standards of care as needed  Outcome: Progressing     Problem: Impaired Swallowing  Goal: Minimize aspiration risk  Description: Interventions:  - Patient should be alert and upright for all feedings (90 degrees preferred)  - Offer food and liquids at a slow rate  - No straws  - Encourage small bites of food and small sips of liquid  - Offer pills one at a time, crush or deliver with applesauce as needed  - Discontinue feeding and notify MD (or speech pathologist) if coughing or persistent throat clearing or wet/gurgly vocal quality is noted  Outcome: Progressing     Problem: Patient/Family Goals  Goal: Patient/Family Long Term Goal  Description: Patient's Long Term Goal: comfort    Interventions:  - Follow POC  - meds as ordered  - See additional Care Plan goals for specific interventions  Outcome: Progressing  Goal: Patient/Family Short Term Goal  Description: Patient's Short Term Goal: improved agitation    Interventions:   - follow POC  - meds as ordered  - PRN meds as needed  - See additional Care Plan goals for specific interventions  Outcome: Progressing

## 2025-07-10 NOTE — PLAN OF CARE
End of life, comfort measures continued. Morphine 6 mg/hr continued. Junior in place. Frequent rounding.    Problem: Risk for Violence/Aggression  Goal: Absence of Violence/Aggression  Description: INTERVENTIONS:   - Identify precipitating factors for behavior   - Notify Charge RN/Provider   - Consider decreasing stimulation   - Consider distraction measures   - Consider discussion with provider regarding prn meds    - Consider URMILA (Moderate Risk only)   - Consider Code Support (High Risk only)   - Consider room safety checks   - Consider restraints  Outcome: Progressing     Problem: PAIN - ADULT  Goal: Verbalizes/displays adequate comfort level or patient's stated pain goal  Description: INTERVENTIONS:  - Encourage pt to monitor pain and request assistance  - Assess pain using appropriate pain scale  - Administer analgesics based on type and severity of pain and evaluate response  - Implement non-pharmacological measures as appropriate and evaluate response  - Consider cultural and social influences on pain and pain management  - Manage/alleviate anxiety  - Utilize distraction and/or relaxation techniques  - Monitor for opioid side effects  - Notify MD/LIP if interventions unsuccessful or patient reports new pain  - Anticipate increased pain with activity and pre-medicate as appropriate  Outcome: Progressing     Problem: SAFETY ADULT - FALL  Goal: Free from fall injury  Description: INTERVENTIONS:  - Assess pt frequently for physical needs  - Identify cognitive and physical deficits and behaviors that affect risk of falls.  - [place] fall precautions as indicated by assessment.  - Educate pt/family on patient safety including physical limitations  - Instruct pt to call for assistance with activity based on assessment  - Modify environment to reduce risk of injury  - Provide assistive devices as appropriate  - Consider OT/PT consult to assist with strengthening/mobility  - Encourage toileting schedule  Outcome:  Progressing     Problem: SKIN/TISSUE INTEGRITY - ADULT  Goal: Skin integrity remains intact  Description: INTERVENTIONS  - Assess and document risk factors for pressure ulcer development  - Assess and document skin integrity  - Monitor for areas of redness and/or skin breakdown  - Initiate interventions, skin care algorithm/standards of care as needed  Outcome: Progressing     Problem: Impaired Swallowing  Goal: Minimize aspiration risk  Description: Interventions:  - Patient should be alert and upright for all feedings (90 degrees preferred)  - Offer food and liquids at a slow rate  - No straws  - Encourage small bites of food and small sips of liquid  - Offer pills one at a time, crush or deliver with applesauce as needed  - Discontinue feeding and notify MD (or speech pathologist) if coughing or persistent throat clearing or wet/gurgly vocal quality is noted  Outcome: Progressing     Problem: Patient/Family Goals  Goal: Patient/Family Long Term Goal  Description: Patient's Long Term Goal: comfort    Interventions:  - Follow POC  - meds as ordered  - See additional Care Plan goals for specific interventions  Outcome: Progressing  Goal: Patient/Family Short Term Goal  Description: Patient's Short Term Goal: improved agitation    Interventions:   - follow POC  - meds as ordered  - PRN meds as needed  - See additional Care Plan goals for specific interventions  Outcome: Progressing

## 2025-07-10 NOTE — PROGRESS NOTES
[facility] Hospitalist Progress Note     CC: Hospital Follow up    PCP: Jimbo Fowler MD       Assessment/Plan:     Active Problems:    Cerebral atherosclerosis    Mr. Shoemaker is a 89 year old male with PMH advanced dementia with history of behavioral disorders and psychosis, hypotension recently presented for profound dehydration and sepsis requiring pressors and significant acute kidney injury and IV fluids ultimately also developed hypoxia and sepsis secondary to urinary tract infection and chronic urinary retention patient was treated medically but prognosis remain guarded ultimately patient was admitted to inpatient hospice.  Comfort care ongoing.      Acute inpatient hospice secondary to advanced dementia and moderate protein calorie malnutrition with behavioral disturbances  -Comfort care ongoing  -Disposition planning per hospice team  Comfortable     DNR comfort confirmed with son     FN:  - IVF: none  - Diet: General Diet    Lines: Peripheral IV  Dispo: inpatient hospice, will likely pass in the next 24-48 hours    Questions/concerns were discussed with patient and/or family by bedside.    Ivy Neumann MD  Hospitalist with [facility]     Subjective:     Unresponsive.     OBJECTIVE:    Blood pressure (!) 88/55, pulse (!) 25, temperature 97.2 °F (36.2 °C), temperature source Temporal, resp. rate 10, SpO2 (!) 78%.    Temp:  [97.2 °F (36.2 °C)-99.8 °F (37.7 °C)] 97.2 °F (36.2 °C)  Pulse:  [25-51] 25  Resp:  [10] 10  BP: (80-88)/(52-55) 88/55  SpO2:  [78 %] 78 %      Intake/Output:    Intake/Output Summary (Last 24 hours) at 7/10/2025 1338  Last data filed at 7/10/2025 0731  Gross per 24 hour   Intake 144.85 ml   Output 475 ml   Net -330.15 ml       Last 3 Weights   07/02/25 0611 131 lb 9.6 oz (59.7 kg)   07/01/25 0600 154 lb 9.6 oz (70.1 kg)   06/30/25 0900 150 lb 3.2 oz (68.1 kg)   06/29/25 1800 145 lb (65.8 kg)   08/02/23 1319 111 lb 8 oz (50.6 kg)   07/20/23 0019 117 lb 4.8 oz  (53.2 kg)   07/19/23 2345 117 lb 4.8 oz (53.2 kg)   07/18/23 1039 126 lb 6.4 oz (57.3 kg)   07/03/23 1958 141 lb 8.6 oz (64.2 kg)   07/03/23 1357 145 lb (65.8 kg)       Exam   Gen: Lethargic laying in bed, unresponsive  Pulm: Lungs clear  CV: Heart with regular rate       Data Review:       Labs:     No results for input(s): \"RBC\", \"HGB\", \"HCT\", \"MCV\", \"MCH\", \"MCHC\", \"RDW\", \"NEPRELIM\", \"WBC\", \"PLT\" in the last 168 hours.        No results for input(s): \"GLU\", \"BUN\", \"CREATSERUM\", \"GFRAA\", \"GFRNAA\", \"EGFRCR\", \"CA\", \"NA\", \"K\", \"CL\", \"CO2\" in the last 168 hours.      No results for input(s): \"ALT\", \"AST\", \"ALB\", \"AMYLASE\", \"LIPASE\", \"LDH\" in the last 168 hours.    Invalid input(s): \"ALPHOS\", \"TBIL\", \"DBIL\", \"TPROT\"      Imaging:  No results found.      Meds:     Scheduled Medications[1]  Medication Infusions[2]  PRN Medications[3]               [1]    scopolamine  1 patch Transdermal Q72H   [2]    morphINE in sodium chloride 0.9% 7 mg/hr (07/10/25 1228)   [3]   bisacodyl    [DISCONTINUED] LORazepam **OR** LORazepam **OR** LORazepam    morphINE in sodium chloride 0.9%    morphINE **OR** morphINE    acetaminophen    OLANZapine    sodium chloride 0.9%    morphINE    haloperidol lactate **OR** haloperidol lactate    glycopyrrolate    ondansetron **OR** ondansetron

## 2025-07-10 NOTE — HOSPICE RN NOTE
Afternoon rounds:   No family at bedside during assessment. Patient remains unresponsive to verbal and tactile stimuli. RR 15, shallow, with 5-7 seconds of occasional apnea. Open mouth breathing. Morphine gtt remains at 7 mg/hr.     Destiny Gil BSN, RN  Transitional Nurse Liaison  [facility]  [phone] (After-hours)

## 2025-07-10 NOTE — HOSPICE RN NOTE
[facility] Inpatient Nursing Rounds:     Patient seen at bedside without family present along with the MSW. Patient unresponsive, open mouth breathing. Breathing is labored with expiratory moans, using accessory muscles with some twitching. Recommended to titrate Morphine gtt from 6mg to 7 mg/hr, and a dose of Ativan IVP.     Scopolamine patch, Morphine gtt 7mg/hr, Ativan IVP X 1 all in the past 24 hours     PPS: 10%    Patient remains eligible for general inpatient hospice care for symptom management of pain and agitation requiring frequent nursing assessments and interventions including titration of IV medications. POC discussed with family and Danielle MURCIA. All are in agreement. [facility] will continue to support the patient and family.     Fátima Gong, DIVINA, PN  [facility] RN Liaison  [phone] (After-hours)

## 2025-07-11 NOTE — HOSPICE RN NOTE
Hospice RN Second Rounds: POC to address dyspnea and agitation reviewed with Sandeep SENIOR and with DIVINA Bonner who are in agreement with IVP 2.5mg Valium now. Patient occasionally moaning, with effort to sit up in bed noted with movement and straining in torso. Agitation and dyspnea ongoing with irregular breathing, labored with apneic pauses RR 8-10. Morphine gtt running at 8mg/hr.     Jeanne Yeboah BSN, RN, CHPN  Transitional Nurse Liaison  [facility]  [phone] (After-hours)

## 2025-07-11 NOTE — PLAN OF CARE
Patient is unresponsive and appears to be comfortable on Morphine drip at 7 mg/hr at the beginning of the shift. Hospice RN Destiny said patient was moaning and asked the drip to be increased. RN heard no moaning from patient and face appears relaxed, no restlessness seen, drip was not increased at that time because RN did not agree with assessment. Respirations are shallow, Scopolamine patch changed behind left ear, no secretions noted. Per hospice RN Destiny, patient to be started on Ativan scheduled, no restlessness or agitation noted, Ativan given per order. Hospice nurse Destiny had Morphine drip order changed to increase to 8 mg/hr. RN still observes no restlessness or agitation, patient did not even move when face cleansed and oral care provided. Hospice RN Katya assessed patient while RN standing in room at the foot of the bed, Katya states she saw patient's foot move several times, RN saw no movement of foot and does not agree with her assessment of agitation. Hospice RN sat in room for some time and states patient was trying to sit up in the bed and suggested giving Valium. RN reassessed patient after Katya's suggestion and patient has not moved from last position he was last seen in and appears comfortable, PCT also notes no movement or distress during daily cares, no Valium given. Junior in place, decrease urine output noted. No family at bedside this shift.     Problem: Risk for Violence/Aggression  Goal: Absence of Violence/Aggression  Description: INTERVENTIONS:   - Identify precipitating factors for behavior   - Notify Charge RN/Provider   - Consider decreasing stimulation   - Consider distraction measures   - Consider discussion with provider regarding prn meds    - Consider URMILA (Moderate Risk only)   - Consider Code Support (High Risk only)   - Consider room safety checks   - Consider restraints  Outcome: Progressing     Problem: PAIN - ADULT  Goal: Verbalizes/displays adequate comfort level or patient's  stated pain goal  Description: INTERVENTIONS:  - Encourage pt to monitor pain and request assistance  - Assess pain using appropriate pain scale  - Administer analgesics based on type and severity of pain and evaluate response  - Implement non-pharmacological measures as appropriate and evaluate response  - Consider cultural and social influences on pain and pain management  - Manage/alleviate anxiety  - Utilize distraction and/or relaxation techniques  - Monitor for opioid side effects  - Notify MD/LIP if interventions unsuccessful or patient reports new pain  - Anticipate increased pain with activity and pre-medicate as appropriate  Outcome: Progressing     Problem: SAFETY ADULT - FALL  Goal: Free from fall injury  Description: INTERVENTIONS:  - Assess pt frequently for physical needs  - Identify cognitive and physical deficits and behaviors that affect risk of falls.  - [place] fall precautions as indicated by assessment.  - Educate pt/family on patient safety including physical limitations  - Instruct pt to call for assistance with activity based on assessment  - Modify environment to reduce risk of injury  - Provide assistive devices as appropriate  - Consider OT/PT consult to assist with strengthening/mobility  - Encourage toileting schedule  Outcome: Progressing     Problem: SKIN/TISSUE INTEGRITY - ADULT  Goal: Skin integrity remains intact  Description: INTERVENTIONS  - Assess and document risk factors for pressure ulcer development  - Assess and document skin integrity  - Monitor for areas of redness and/or skin breakdown  - Initiate interventions, skin care algorithm/standards of care as needed  Outcome: Progressing     Problem: Impaired Swallowing  Goal: Minimize aspiration risk  Description: Interventions:  - Patient should be alert and upright for all feedings (90 degrees preferred)  - Offer food and liquids at a slow rate  - No straws  - Encourage small bites of food and small sips of liquid  - Offer  pills one at a time, crush or deliver with applesauce as needed  - Discontinue feeding and notify MD (or speech pathologist) if coughing or persistent throat clearing or wet/gurgly vocal quality is noted  Outcome: Progressing     Problem: Patient/Family Goals  Goal: Patient/Family Long Term Goal  Description: Patient's Long Term Goal: comfort    Interventions:  - Follow POC  - meds as ordered  - See additional Care Plan goals for specific interventions  Outcome: Progressing  Goal: Patient/Family Short Term Goal  Description: Patient's Short Term Goal: improved agitation    Interventions:   - follow POC  - meds as ordered  - PRN meds as needed  - See additional Care Plan goals for specific interventions  Outcome: Progressing

## 2025-07-11 NOTE — PLAN OF CARE
Pt is unresponsive. Family visited overnight. Morphine drip for pain. Junior in place and care provided. Repositioned as needed. Oral care provided. Safety measures in place, frequent rounding.  Problem: Risk for Violence/Aggression  Goal: Absence of Violence/Aggression  Description: INTERVENTIONS:   - Identify precipitating factors for behavior   - Notify Charge RN/Provider   - Consider decreasing stimulation   - Consider distraction measures   - Consider discussion with provider regarding prn meds    - Consider URMILA (Moderate Risk only)   - Consider Code Support (High Risk only)   - Consider room safety checks   - Consider restraints  Outcome: Progressing     Problem: PAIN - ADULT  Goal: Verbalizes/displays adequate comfort level or patient's stated pain goal  Description: INTERVENTIONS:  - Encourage pt to monitor pain and request assistance  - Assess pain using appropriate pain scale  - Administer analgesics based on type and severity of pain and evaluate response  - Implement non-pharmacological measures as appropriate and evaluate response  - Consider cultural and social influences on pain and pain management  - Manage/alleviate anxiety  - Utilize distraction and/or relaxation techniques  - Monitor for opioid side effects  - Notify MD/LIP if interventions unsuccessful or patient reports new pain  - Anticipate increased pain with activity and pre-medicate as appropriate  Outcome: Progressing     Problem: SAFETY ADULT - FALL  Goal: Free from fall injury  Description: INTERVENTIONS:  - Assess pt frequently for physical needs  - Identify cognitive and physical deficits and behaviors that affect risk of falls.  - [place] fall precautions as indicated by assessment.  - Educate pt/family on patient safety including physical limitations  - Instruct pt to call for assistance with activity based on assessment  - Modify environment to reduce risk of injury  - Provide assistive devices as appropriate  - Consider OT/PT  consult to assist with strengthening/mobility  - Encourage toileting schedule  Outcome: Progressing     Problem: SKIN/TISSUE INTEGRITY - ADULT  Goal: Skin integrity remains intact  Description: INTERVENTIONS  - Assess and document risk factors for pressure ulcer development  - Assess and document skin integrity  - Monitor for areas of redness and/or skin breakdown  - Initiate interventions, skin care algorithm/standards of care as needed  Outcome: Progressing     Problem: Impaired Swallowing  Goal: Minimize aspiration risk  Description: Interventions:  - Patient should be alert and upright for all feedings (90 degrees preferred)  - Offer food and liquids at a slow rate  - No straws  - Encourage small bites of food and small sips of liquid  - Offer pills one at a time, crush or deliver with applesauce as needed  - Discontinue feeding and notify MD (or speech pathologist) if coughing or persistent throat clearing or wet/gurgly vocal quality is noted  Outcome: Progressing     Problem: Patient/Family Goals  Goal: Patient/Family Long Term Goal  Description: Patient's Long Term Goal: comfort    Interventions:  - Follow POC  - meds as ordered  - See additional Care Plan goals for specific interventions  Outcome: Progressing  Goal: Patient/Family Short Term Goal  Description: Patient's Short Term Goal: improved agitation    Interventions:   - follow POC  - meds as ordered  - PRN meds as needed  - See additional Care Plan goals for specific interventions  Outcome: Progressing

## 2025-07-11 NOTE — HOSPICE RN NOTE
[facility] Inpatient Nursing Rounds:  No family in room during assessment. Patient unresponsive, audible moaning increased in occurences with tactile stimuli. RR  10 shallow, labored. BS Hypoactive, Last BM 7/10. Junior to gravity draining wisam colored urine.      Medications last 24 hrs:  Morphine gtt 7mg/hr for pain and dyspnea  Ativan 2 mg for agitation     PPS: 10%    Recommended to Michelle RN to increase Morphine htt to 8 mg/hr and give Morphine IVP. Michelle agreed to give doses.     Patient remains eligible for general inpatient hospice care for symptom management of Pain, Dyspnea, and Agitation requiring frequent nursing assessments and interventions including titration of IV medications. POC discussed with Michelle MURCIA, Family, Dr. Watts and Dr. Jacome. All in agreement. [facility] will continue to support the patient and family.      Destiny HALLN, RN  Transitional Nurse Liaison  [facility]  [phone] (After-hours)

## 2025-07-11 NOTE — PROGRESS NOTES
[facility] Hospitalist Progress Note     CC: Hospital Follow up    PCP: Jimbo Fowler MD       Assessment/Plan:     Active Problems:    Cerebral atherosclerosis    Mr. Shoemakre is a 89 year old male with PMH advanced dementia with history of behavioral disorders and psychosis, hypotension recently presented for profound dehydration and sepsis requiring pressors and significant acute kidney injury and IV fluids ultimately also developed hypoxia and sepsis secondary to urinary tract infection and chronic urinary retention patient was treated medically but prognosis remain guarded ultimately patient was admitted to inpatient hospice.  Comfort care ongoing.      Acute inpatient hospice secondary to advanced dementia and moderate protein calorie malnutrition with behavioral disturbances  -Comfort care ongoing  -Disposition planning per hospice team  Comfortable     DNR comfort confirmed with son     FN:  - IVF: none  - Diet: General Diet    Lines: Peripheral IV  Dispo: inpatient hospice, will likely pass in the next 24-48 hours    Questions/concerns were discussed with patient and/or family by bedside.    Gilmer Watts MD  Hospitalist with [facility]     Subjective:     Unresponsive.  Patient's personal history of career and community involvement at bedside.    OBJECTIVE:    Blood pressure (!) 85/57, pulse 95, temperature 97.4 °F (36.3 °C), temperature source Axillary, resp. rate 12, SpO2 (!) 85%.    Temp:  [97.2 °F (36.2 °C)-97.4 °F (36.3 °C)] 97.4 °F (36.3 °C)  Pulse:  [25-95] 95  Resp:  [10-12] 12  BP: (85-88)/(55-57) 85/57  SpO2:  [78 %-85 %] 85 %      Intake/Output:    Intake/Output Summary (Last 24 hours) at 7/11/2025 0811  Last data filed at 7/11/2025 0723  Gross per 24 hour   Intake 158.55 ml   Output 300 ml   Net -141.45 ml       Last 3 Weights   07/02/25 0611 131 lb 9.6 oz (59.7 kg)   07/01/25 0600 154 lb 9.6 oz (70.1 kg)   06/30/25 0900 150 lb 3.2 oz (68.1 kg)   06/29/25 1800 145 lb (65.8  kg)   08/02/23 1319 111 lb 8 oz (50.6 kg)   07/20/23 0019 117 lb 4.8 oz (53.2 kg)   07/19/23 2345 117 lb 4.8 oz (53.2 kg)   07/18/23 1039 126 lb 6.4 oz (57.3 kg)   07/03/23 1958 141 lb 8.6 oz (64.2 kg)   07/03/23 1357 145 lb (65.8 kg)       Exam   Gen: Lethargic laying in bed, unresponsive  Pulm: Lungs clear  CV: Heart with regular rate       Data Review:       Labs:     No results for input(s): \"RBC\", \"HGB\", \"HCT\", \"MCV\", \"MCH\", \"MCHC\", \"RDW\", \"NEPRELIM\", \"WBC\", \"PLT\" in the last 168 hours.        No results for input(s): \"GLU\", \"BUN\", \"CREATSERUM\", \"GFRAA\", \"GFRNAA\", \"EGFRCR\", \"CA\", \"NA\", \"K\", \"CL\", \"CO2\" in the last 168 hours.      No results for input(s): \"ALT\", \"AST\", \"ALB\", \"AMYLASE\", \"LIPASE\", \"LDH\" in the last 168 hours.    Invalid input(s): \"ALPHOS\", \"TBIL\", \"DBIL\", \"TPROT\"      Imaging:  No results found.      Meds:     Scheduled Medications[1]  Medication Infusions[2]  PRN Medications[3]                 [1]    scopolamine  1 patch Transdermal Q72H   [2]    morphINE in sodium chloride 0.9% 7 mg/hr (07/10/25 2114)   [3]   bisacodyl    [DISCONTINUED] LORazepam **OR** LORazepam **OR** LORazepam    morphINE in sodium chloride 0.9%    morphINE **OR** morphINE    acetaminophen    OLANZapine    sodium chloride 0.9%    morphINE    haloperidol lactate **OR** haloperidol lactate    glycopyrrolate    ondansetron **OR** ondansetron

## 2025-07-12 NOTE — PLAN OF CARE
Unresponsive, appears comfortable. Morphine drip 8mg/hr continued. Oral and pinzon care provided. Respirations shallow. Repositioned as needed. No family at beside during the shift. Safety measures in place, frequent rounding preformed.    Problem: Risk for Violence/Aggression  Goal: Absence of Violence/Aggression  Description: INTERVENTIONS:   - Identify precipitating factors for behavior   - Notify Charge RN/Provider   - Consider decreasing stimulation   - Consider distraction measures   - Consider discussion with provider regarding prn meds    - Consider URMILA (Moderate Risk only)   - Consider Code Support (High Risk only)   - Consider room safety checks   - Consider restraints  Outcome: Progressing     Problem: PAIN - ADULT  Goal: Verbalizes/displays adequate comfort level or patient's stated pain goal  Description: INTERVENTIONS:  - Encourage pt to monitor pain and request assistance  - Assess pain using appropriate pain scale  - Administer analgesics based on type and severity of pain and evaluate response  - Implement non-pharmacological measures as appropriate and evaluate response  - Consider cultural and social influences on pain and pain management  - Manage/alleviate anxiety  - Utilize distraction and/or relaxation techniques  - Monitor for opioid side effects  - Notify MD/LIP if interventions unsuccessful or patient reports new pain  - Anticipate increased pain with activity and pre-medicate as appropriate  Outcome: Progressing     Problem: SAFETY ADULT - FALL  Goal: Free from fall injury  Description: INTERVENTIONS:  - Assess pt frequently for physical needs  - Identify cognitive and physical deficits and behaviors that affect risk of falls.  - [place] fall precautions as indicated by assessment.  - Educate pt/family on patient safety including physical limitations  - Instruct pt to call for assistance with activity based on assessment  - Modify environment to reduce risk of injury  - Provide assistive  devices as appropriate  - Consider OT/PT consult to assist with strengthening/mobility  - Encourage toileting schedule  Outcome: Progressing     Problem: SKIN/TISSUE INTEGRITY - ADULT  Goal: Skin integrity remains intact  Description: INTERVENTIONS  - Assess and document risk factors for pressure ulcer development  - Assess and document skin integrity  - Monitor for areas of redness and/or skin breakdown  - Initiate interventions, skin care algorithm/standards of care as needed  Outcome: Progressing     Problem: Impaired Swallowing  Goal: Minimize aspiration risk  Description: Interventions:  - Patient should be alert and upright for all feedings (90 degrees preferred)  - Offer food and liquids at a slow rate  - No straws  - Encourage small bites of food and small sips of liquid  - Offer pills one at a time, crush or deliver with applesauce as needed  - Discontinue feeding and notify MD (or speech pathologist) if coughing or persistent throat clearing or wet/gurgly vocal quality is noted  Outcome: Progressing     Problem: Patient/Family Goals  Goal: Patient/Family Long Term Goal  Description: Patient's Long Term Goal: comfort    Interventions:  - Follow POC  - meds as ordered  - See additional Care Plan goals for specific interventions  Outcome: Progressing  Goal: Patient/Family Short Term Goal  Description: Patient's Short Term Goal: improved agitation    Interventions:   - follow POC  - meds as ordered  - PRN meds as needed  - See additional Care Plan goals for specific interventions  Outcome: Progressing

## 2025-07-12 NOTE — PLAN OF CARE
Patient unresponsive, appears comfortable, continue morphine drip at 8 mg/hr, no family at bedside, continue pinzon cath, frequent rounding and mouth care provided.  Problem: PAIN - ADULT  Goal: Verbalizes/displays adequate comfort level or patient's stated pain goal  Description: INTERVENTIONS:  - Encourage pt to monitor pain and request assistance  - Assess pain using appropriate pain scale  - Administer analgesics based on type and severity of pain and evaluate response  - Implement non-pharmacological measures as appropriate and evaluate response  - Consider cultural and social influences on pain and pain management  - Manage/alleviate anxiety  - Utilize distraction and/or relaxation techniques  - Monitor for opioid side effects  - Notify MD/LIP if interventions unsuccessful or patient reports new pain  - Anticipate increased pain with activity and pre-medicate as appropriate  Outcome: Progressing     Problem: SAFETY ADULT - FALL  Goal: Free from fall injury  Description: INTERVENTIONS:  - Assess pt frequently for physical needs  - Identify cognitive and physical deficits and behaviors that affect risk of falls.  - [place] fall precautions as indicated by assessment.  - Educate pt/family on patient safety including physical limitations  - Instruct pt to call for assistance with activity based on assessment  - Modify environment to reduce risk of injury  - Provide assistive devices as appropriate  - Consider OT/PT consult to assist with strengthening/mobility  - Encourage toileting schedule  Outcome: Progressing     Problem: Patient/Family Goals  Goal: Patient/Family Long Term Goal  Description: Patient's Long Term Goal: comfort    Interventions:  - Follow POC  - meds as ordered  - See additional Care Plan goals for specific interventions  Outcome: Progressing  Goal: Patient/Family Short Term Goal  Description: Patient's Short Term Goal: improved agitation    Interventions:   - follow POC  - meds as ordered  -  PRN meds as needed  - See additional Care Plan goals for specific interventions  Outcome: Progressing

## 2025-07-12 NOTE — PROGRESS NOTES
[facility] Hospitalist Progress Note     CC: Hospital Follow up    PCP: Jimbo Fowler MD       Assessment/Plan:     Active Problems:    Cerebral atherosclerosis    Mr. Shoemaker is a 89 year old male with PMH advanced dementia with history of behavioral disorders and psychosis, hypotension recently presented for profound dehydration and sepsis requiring pressors and significant acute kidney injury and IV fluids ultimately also developed hypoxia and sepsis secondary to urinary tract infection and chronic urinary retention patient was treated medically but prognosis remain guarded ultimately patient was admitted to inpatient hospice.  Comfort care ongoing.      Acute inpatient hospice secondary to advanced dementia and moderate protein calorie malnutrition with behavioral disturbances  -Comfort care ongoing  -Disposition planning per hospice team  Comfortable     DNR comfort confirmed with son     FN:  - IVF: none  - Diet: General Diet    Lines: Peripheral IV  Dispo: inpatient hospice, will likely pass in the next 24-48 hours    Questions/concerns were discussed with patient and/or family by bedside.    Gilmer Watts MD  Hospitalist with [facility]     Subjective:     Unresponsive. Patient with no significant changes over the past day.  Patient appears to be comfortable.  Morphine drip in place.    OBJECTIVE:    Blood pressure (!) 89/63, pulse 107, temperature 99.7 °F (37.6 °C), temperature source Oral, resp. rate 10, SpO2 90%.    Temp:  [98.9 °F (37.2 °C)-99.7 °F (37.6 °C)] 99.7 °F (37.6 °C)  Pulse:  [] 107  Resp:  [10-12] 10  BP: (73-89)/(58-63) 89/63  SpO2:  [77 %-90 %] 90 %      Intake/Output:    Intake/Output Summary (Last 24 hours) at 7/12/2025 1117  Last data filed at 7/12/2025 0720  Gross per 24 hour   Intake 180.4 ml   Output 425 ml   Net -244.6 ml       Last 3 Weights   07/02/25 0611 131 lb 9.6 oz (59.7 kg)   07/01/25 0600 154 lb 9.6 oz (70.1 kg)   06/30/25 0900 150 lb 3.2 oz (68.1  kg)   06/29/25 1800 145 lb (65.8 kg)   08/02/23 1319 111 lb 8 oz (50.6 kg)   07/20/23 0019 117 lb 4.8 oz (53.2 kg)   07/19/23 2345 117 lb 4.8 oz (53.2 kg)   07/18/23 1039 126 lb 6.4 oz (57.3 kg)   07/03/23 1958 141 lb 8.6 oz (64.2 kg)   07/03/23 1357 145 lb (65.8 kg)       Exam   Gen: Lethargic laying in bed, unresponsive  Pulm: Lungs clear  CV: Heart with regular rate.  Pulses intact. no lower extremity edema.      Data Review:       Labs:     No results for input(s): \"RBC\", \"HGB\", \"HCT\", \"MCV\", \"MCH\", \"MCHC\", \"RDW\", \"NEPRELIM\", \"WBC\", \"PLT\" in the last 168 hours.        No results for input(s): \"GLU\", \"BUN\", \"CREATSERUM\", \"GFRAA\", \"GFRNAA\", \"EGFRCR\", \"CA\", \"NA\", \"K\", \"CL\", \"CO2\" in the last 168 hours.      No results for input(s): \"ALT\", \"AST\", \"ALB\", \"AMYLASE\", \"LIPASE\", \"LDH\" in the last 168 hours.    Invalid input(s): \"ALPHOS\", \"TBIL\", \"DBIL\", \"TPROT\"      Imaging:  No results found.      Meds:     Scheduled Medications[1]  Medication Infusions[2]  PRN Medications[3]                   [1]    LORazepam Intensol  1 mg Sublingual q6h    scopolamine  1 patch Transdermal Q72H   [2]    morphINE in sodium chloride 0.9% 8 mg/hr (07/12/25 0003)   [3]   morphINE in sodium chloride 0.9%    diazepam    bisacodyl    [DISCONTINUED] LORazepam **OR** LORazepam **OR** LORazepam    morphINE **OR** morphINE    acetaminophen    OLANZapine    sodium chloride 0.9%    morphINE    haloperidol lactate **OR** haloperidol lactate    glycopyrrolate    ondansetron **OR** ondansetron

## 2025-07-13 NOTE — HOSPICE RN NOTE
[facility] Inpatient Nursing Rounds:     Hospice RN visited patient at bedside. No family present. Will reach out via telephone. Patient unresponsive to verbal and tactile stimuli. He remains on Morphine drip at rate of 8mg/hr. Breathing labored, shallow, irregular, RR 10, diminished in all fields with use of neck and abdominal accessory muscles. Pt is having apneic pauses from 10-60 seconds, during my assessment I witnessed a 60 second pause. Skin cool, pale. Indwelling urinary catheter remains, draining minimal urine. LBM: 7/10/25. Bowel sounds hypoactive in all quadrants.     Reviewed symptom management over the past 24 hours: Scopolamine patch in place behind left ear for airway secretions. Morphine infusion currently at 8 mg/hr for pain and dyspnea. PRN medications given include: Robinul 0.4mg IVP x1 for excessive secretions.      PPS: 10%     Patient receiving general inpatient hospice care for symptom management of pain, dyspnea, agitation, and airway secretions requiring frequent nursing assessments and interventions including the administration and titration of IV medications per Dr. Watts and Dr. Jacome. Plan to transfer to routine level of care with CADD pump at LTC facility, pending symptom management. Will reassess tomorrow and if symptoms remain managed at this current setting of morphine drip, we will discuss transitioning to a dilaudid drip at an equivalent rate with the goal of transitioning to routine level of care with the CADD pump. Current Morphine shortage for CADD pump will require transition to Dilaudid drip at equivalent rate. POC discussed with Ann Acevedo RN at bedside, in agreement. [facility] will continue to support the patient and family.    James Wesley RN BSN  [facility]  Transitional Nurse Liaison  [phone] / [phone] (after hours)

## 2025-07-13 NOTE — PROGRESS NOTES
[facility] Hospitalist Progress Note     CC: Hospital Follow up    PCP: Jimbo Fowler MD       Assessment/Plan:     Active Problems:    Cerebral atherosclerosis    Mr. Shoemaker is a 89 year old male with PMH advanced dementia with history of behavioral disorders and psychosis, hypotension recently presented for profound dehydration and sepsis requiring pressors and significant acute kidney injury and IV fluids ultimately also developed hypoxia and sepsis secondary to urinary tract infection and chronic urinary retention patient was treated medically but prognosis remain guarded ultimately patient was admitted to inpatient hospice.  Comfort care ongoing.      Acute inpatient hospice secondary to advanced dementia and moderate protein calorie malnutrition with behavioral disturbances  -Comfort care ongoing  -Disposition planning per hospice team  Comfortable   - 2.5 mg Valium as needed overnight 7/12 given occasional moaning and sitting up in bed for dyspnea and agitation with irregular labored breathing and apneic pauses despite continued morphine 20 mg/h.  Discussed with nurse caring for patient yesterday during the day and today.  Was not needed and does not appear to be needed currently.  Will leave on board if necessary    DNR comfort confirmed with son     FN:  - IVF: none  - Diet: General Diet    Lines: Peripheral IV  Dispo: inpatient hospice, will likely pass in the next 24-48 hours    Questions/concerns were discussed with patient and/or family by bedside.    Gilmer Watts MD  Hospitalist with [facility]     Subjective:     Unresponsive. Patient with no significant changes over the past day.  Patient appears to be comfortable.  Morphine drip in place.    OBJECTIVE:    Blood pressure 101/76, pulse 101, temperature 96.8 °F (36 °C), temperature source Axillary, resp. rate (!) 5, SpO2 (!) 85%.    Temp:  [96.8 °F (36 °C)-99.7 °F (37.6 °C)] 96.8 °F (36 °C)  Pulse:  [101-107] 101  Resp:  [5-10]  5  BP: ()/(63-76) 101/76  SpO2:  [85 %-90 %] 85 %      Intake/Output:    Intake/Output Summary (Last 24 hours) at 7/13/2025 0751  Last data filed at 7/13/2025 0711  Gross per 24 hour   Intake 189.6 ml   Output 700 ml   Net -510.4 ml       Last 3 Weights   07/02/25 0611 131 lb 9.6 oz (59.7 kg)   07/01/25 0600 154 lb 9.6 oz (70.1 kg)   06/30/25 0900 150 lb 3.2 oz (68.1 kg)   06/29/25 1800 145 lb (65.8 kg)   08/02/23 1319 111 lb 8 oz (50.6 kg)   07/20/23 0019 117 lb 4.8 oz (53.2 kg)   07/19/23 2345 117 lb 4.8 oz (53.2 kg)   07/18/23 1039 126 lb 6.4 oz (57.3 kg)   07/03/23 1958 141 lb 8.6 oz (64.2 kg)   07/03/23 1357 145 lb (65.8 kg)       Exam   Gen: Lethargic laying in bed, unresponsive  Pulm: Lungs clear  CV: Heart with regular rate.  Pulses intact. no lower extremity edema.      Data Review:       Labs:     No results for input(s): \"RBC\", \"HGB\", \"HCT\", \"MCV\", \"MCH\", \"MCHC\", \"RDW\", \"NEPRELIM\", \"WBC\", \"PLT\" in the last 168 hours.        No results for input(s): \"GLU\", \"BUN\", \"CREATSERUM\", \"GFRAA\", \"GFRNAA\", \"EGFRCR\", \"CA\", \"NA\", \"K\", \"CL\", \"CO2\" in the last 168 hours.      No results for input(s): \"ALT\", \"AST\", \"ALB\", \"AMYLASE\", \"LIPASE\", \"LDH\" in the last 168 hours.    Invalid input(s): \"ALPHOS\", \"TBIL\", \"DBIL\", \"TPROT\"      Imaging:  No results found.      Meds:     Scheduled Medications[1]  Medication Infusions[2]  PRN Medications[3]                     [1]    LORazepam Intensol  1 mg Sublingual q6h    scopolamine  1 patch Transdermal Q72H   [2]    morphINE in sodium chloride 0.9% 8 mg/hr (07/13/25 0043)   [3]   morphINE in sodium chloride 0.9%    diazepam    bisacodyl    [DISCONTINUED] LORazepam **OR** LORazepam **OR** LORazepam    morphINE **OR** morphINE    acetaminophen    OLANZapine    sodium chloride 0.9%    morphINE    haloperidol lactate **OR** haloperidol lactate    glycopyrrolate    ondansetron **OR** ondansetron

## 2025-07-13 NOTE — PLAN OF CARE
Patient unresponsive, complete care, appears comfortable, continue morphine drip at 8 mg/hr, pinzon cath in place, no family at bedside at this time, pt had small/smear incontinent BM.  Problem: PAIN - ADULT  Goal: Verbalizes/displays adequate comfort level or patient's stated pain goal  Description: INTERVENTIONS:  - Encourage pt to monitor pain and request assistance  - Assess pain using appropriate pain scale  - Administer analgesics based on type and severity of pain and evaluate response  - Implement non-pharmacological measures as appropriate and evaluate response  - Consider cultural and social influences on pain and pain management  - Manage/alleviate anxiety  - Utilize distraction and/or relaxation techniques  - Monitor for opioid side effects  - Notify MD/LIP if interventions unsuccessful or patient reports new pain  - Anticipate increased pain with activity and pre-medicate as appropriate  Outcome: Progressing     Problem: SAFETY ADULT - FALL  Goal: Free from fall injury  Description: INTERVENTIONS:  - Assess pt frequently for physical needs  - Identify cognitive and physical deficits and behaviors that affect risk of falls.  - [place] fall precautions as indicated by assessment.  - Educate pt/family on patient safety including physical limitations  - Instruct pt to call for assistance with activity based on assessment  - Modify environment to reduce risk of injury  - Provide assistive devices as appropriate  - Consider OT/PT consult to assist with strengthening/mobility  - Encourage toileting schedule  Outcome: Progressing     Problem: SKIN/TISSUE INTEGRITY - ADULT  Goal: Skin integrity remains intact  Description: INTERVENTIONS  - Assess and document risk factors for pressure ulcer development  - Assess and document skin integrity  - Monitor for areas of redness and/or skin breakdown  - Initiate interventions, skin care algorithm/standards of care as needed  Outcome: Progressing

## 2025-07-13 NOTE — PLAN OF CARE
Problem: PAIN - ADULT  Goal: Verbalizes/displays adequate comfort level or patient's stated pain goal  Description: INTERVENTIONS:  - Encourage pt to monitor pain and request assistance  - Assess pain using appropriate pain scale  - Administer analgesics based on type and severity of pain and evaluate response  - Implement non-pharmacological measures as appropriate and evaluate response  - Consider cultural and social influences on pain and pain management  - Manage/alleviate anxiety  - Utilize distraction and/or relaxation techniques  - Monitor for opioid side effects  - Notify MD/LIP if interventions unsuccessful or patient reports new pain  - Anticipate increased pain with activity and pre-medicate as appropriate  Outcome: Progressing     Problem: SAFETY ADULT - FALL  Goal: Free from fall injury  Description: INTERVENTIONS:  - Assess pt frequently for physical needs  - Identify cognitive and physical deficits and behaviors that affect risk of falls.  - [place] fall precautions as indicated by assessment.  - Educate pt/family on patient safety including physical limitations  - Instruct pt to call for assistance with activity based on assessment  - Modify environment to reduce risk of injury  - Provide assistive devices as appropriate  - Consider OT/PT consult to assist with strengthening/mobility  - Encourage toileting schedule  Outcome: Progressing

## 2025-07-14 NOTE — HOSPICE RN NOTE
[facility] Inpatient Nursing Rounds:     Hospice RN and MSW visited patient at bedside. No family present. Will reach out via telephone. Patient unresponsive to verbal and tactile stimuli. He remains on Morphine drip at rate of 8mg/hr. Breathing labored, shallow, irregular, RR 9, diminished in all fields with use of neck and abdominal accessory muscles. Pt is having apneic pauses from 30-60 seconds, during my assessment I witnessed a 50 second pause. Skin cool, pale. Indwelling urinary catheter remains, draining minimal urine. LBM: 7/13/25. Bowel sounds hypoactive in all quadrants.     Reviewed symptom management over the past 24 hours: Scopolamine patch in place behind left ear for airway secretions. Morphine infusion currently at 8 mg/hr for pain and dyspnea. PRN medications given include: Robinul 0.4mg IVP x2 for excessive secretions, and Ativan 1mg IVP x1 for restlessness.     Pt has had Lorazepam Intensol 1mg scheduled [id number] to be given sublingually as an attempt to trial home meds for a possible transition to routine level of care. Per nursing staff the last 11 doses have been unable to be given due to pt not tolerating this route.     PPS: 10%     Patient receiving general inpatient hospice care for symptom management of pain, dyspnea, agitation, and airway secretions requiring frequent nursing assessments and interventions including the administration and titration of IV medications per Dr. Watts and Dr. Jacome. Plan to transfer to routine level of care with CADD pump at LTC facility, pending symptom management. Will reassess daily for maintained symptom management of all symptoms. Should all symptoms become appropriately managed we will discuss transitioning to a dilaudid drip at an equivalent rate with the goal of transitioning to routine level of care with the CADD pump. Current Morphine shortage for CADD pump will require transition to Dilaudid drip at equivalent rate. POC discussed with  Alexandra Whitaker RN at bedside, in agreement. [facility] will continue to support the patient and family.    James Wesley RN BSN  [facility]  Transitional Nurse Liaison  [phone] / [phone] (after hours)

## 2025-07-14 NOTE — HOSPICE RN NOTE
[facility] Second Rounds:    Per DIVINA Morris pt was tachypneic in afternoon and received a dose of IVP Morphine. Also stating pt has been having persistent secretions throughout the day, multiple doses of PRN IVP Robinul given today. [facility] will reevaluate secretions tomorrow and if they are remaining persistent will discuss transitioning Robinul to being Scheduled q4hr.    James Wesley RN BSN  [facility]  Transitional Nurse Liaison  [phone] / [phone] (after hours

## 2025-07-14 NOTE — PROGRESS NOTES
[facility] Hospitalist Progress Note     CC: Hospital Follow up    PCP: Jimbo Fowler MD       Assessment/Plan:     Active Problems:    Cerebral atherosclerosis    Mr. Shoemaker is a 89 year old male with PMH advanced dementia with history of behavioral disorders and psychosis, hypotension recently presented for profound dehydration and sepsis requiring pressors and significant acute kidney injury and IV fluids ultimately also developed hypoxia and sepsis secondary to urinary tract infection and chronic urinary retention patient was treated medically but prognosis remain guarded ultimately patient was admitted to inpatient hospice.  Comfort care ongoing.      Acute inpatient hospice secondary to advanced dementia and moderate protein calorie malnutrition with behavioral disturbances  -Comfort care ongoing  -Disposition planning per hospice team  Comfortable   - 2.5 mg Valium as needed overnight 7/12 given occasional moaning and sitting up in bed for dyspnea and agitation with irregular labored breathing and apneic pauses despite continued morphine 20 mg/h.  Discussed with nurse caring for patient yesterday during the day and today.  Was not needed and does not appear to be needed currently.  Will leave on board if necessary  - Some reported apneic pauses 7/13, minimal urine output.   - Scopolamine patch placed given increased secretions.      DNR comfort confirmed with son     FN:  - IVF: none  - Diet: General Diet    Lines: Peripheral IV  Dispo: inpatient hospice, will likely pass in the next 24-48 hours    Questions/concerns were discussed with patient and/or family by bedside.    Gilmer Watts MD  Hospitalist with [facility]     Subjective:     Unresponsive. Patient with no significant changes over the past day.  Patient appears to be comfortable.  Morphine drip in place.    OBJECTIVE:    Blood pressure (!) 74/57, pulse 64, temperature 99.3 °F (37.4 °C), temperature source Axillary, resp.  rate (!) 5, SpO2 (!) 88%.    Temp:  [97.1 °F (36.2 °C)-99.3 °F (37.4 °C)] 99.3 °F (37.4 °C)  Pulse:  [55-64] 64  Resp:  [4-5] 5  BP: (74-87)/(57-63) 74/57  SpO2:  [84 %-88 %] 88 %      Intake/Output:    Intake/Output Summary (Last 24 hours) at 7/14/2025 0901  Last data filed at 7/14/2025 0733  Gross per 24 hour   Intake 194.35 ml   Output 670 ml   Net -475.65 ml       Last 3 Weights   07/02/25 0611 131 lb 9.6 oz (59.7 kg)   07/01/25 0600 154 lb 9.6 oz (70.1 kg)   06/30/25 0900 150 lb 3.2 oz (68.1 kg)   06/29/25 1800 145 lb (65.8 kg)   08/02/23 1319 111 lb 8 oz (50.6 kg)   07/20/23 0019 117 lb 4.8 oz (53.2 kg)   07/19/23 2345 117 lb 4.8 oz (53.2 kg)   07/18/23 1039 126 lb 6.4 oz (57.3 kg)   07/03/23 1958 141 lb 8.6 oz (64.2 kg)   07/03/23 1357 145 lb (65.8 kg)       Exam   Gen: Lethargic laying in bed, unresponsive  Pulm: Lungs clear.  No apneic pauses observed  CV: Heart with regular rate.  Pulses intact. no lower extremity edema.      Data Review:       Labs:     No results for input(s): \"RBC\", \"HGB\", \"HCT\", \"MCV\", \"MCH\", \"MCHC\", \"RDW\", \"NEPRELIM\", \"WBC\", \"PLT\" in the last 168 hours.        No results for input(s): \"GLU\", \"BUN\", \"CREATSERUM\", \"GFRAA\", \"GFRNAA\", \"EGFRCR\", \"CA\", \"NA\", \"K\", \"CL\", \"CO2\" in the last 168 hours.      No results for input(s): \"ALT\", \"AST\", \"ALB\", \"AMYLASE\", \"LIPASE\", \"LDH\" in the last 168 hours.    Invalid input(s): \"ALPHOS\", \"TBIL\", \"DBIL\", \"TPROT\"      Imaging:  No results found.      Meds:     Scheduled Medications[1]  Medication Infusions[2]  PRN Medications[3]                       [1]    LORazepam Intensol  1 mg Sublingual q6h    scopolamine  1 patch Transdermal Q72H   [2]    morphINE in sodium chloride 0.9% 8 mg/hr (07/14/25 0128)   [3]   morphINE in sodium chloride 0.9%    diazepam    bisacodyl    [DISCONTINUED] LORazepam **OR** LORazepam **OR** LORazepam    morphINE **OR** morphINE    acetaminophen    OLANZapine    sodium chloride 0.9%    morphINE    haloperidol lactate **OR**  haloperidol lactate    glycopyrrolate    ondansetron **OR** ondansetron

## 2025-07-14 NOTE — PLAN OF CARE
Patient minimally responsive. Tachypneic in afternoon, 1x ivp morphine given. Robinul for secretions. [place] patch changed. Morphine gtt at 8mg/hr. Pt bathed, oral care provided. Bed locked and in lowest position.   Problem: Risk for Violence/Aggression  Goal: Absence of Violence/Aggression  Description: INTERVENTIONS:   - Identify precipitating factors for behavior   - Notify Charge RN/Provider   - Consider decreasing stimulation   - Consider distraction measures   - Consider discussion with provider regarding prn meds    - Consider URMILA (Moderate Risk only)   - Consider Code Support (High Risk only)   - Consider room safety checks   - Consider restraints  Outcome: Progressing     Problem: PAIN - ADULT  Goal: Verbalizes/displays adequate comfort level or patient's stated pain goal  Description: INTERVENTIONS:  - Encourage pt to monitor pain and request assistance  - Assess pain using appropriate pain scale  - Administer analgesics based on type and severity of pain and evaluate response  - Implement non-pharmacological measures as appropriate and evaluate response  - Consider cultural and social influences on pain and pain management  - Manage/alleviate anxiety  - Utilize distraction and/or relaxation techniques  - Monitor for opioid side effects  - Notify MD/LIP if interventions unsuccessful or patient reports new pain  - Anticipate increased pain with activity and pre-medicate as appropriate  Outcome: Progressing     Problem: SAFETY ADULT - FALL  Goal: Free from fall injury  Description: INTERVENTIONS:  - Assess pt frequently for physical needs  - Identify cognitive and physical deficits and behaviors that affect risk of falls.  - [place] fall precautions as indicated by assessment.  - Educate pt/family on patient safety including physical limitations  - Instruct pt to call for assistance with activity based on assessment  - Modify environment to reduce risk of injury  - Provide assistive devices as  appropriate  - Consider OT/PT consult to assist with strengthening/mobility  - Encourage toileting schedule  Outcome: Progressing     Problem: SKIN/TISSUE INTEGRITY - ADULT  Goal: Skin integrity remains intact  Description: INTERVENTIONS  - Assess and document risk factors for pressure ulcer development  - Assess and document skin integrity  - Monitor for areas of redness and/or skin breakdown  - Initiate interventions, skin care algorithm/standards of care as needed  Outcome: Progressing     Problem: Impaired Swallowing  Goal: Minimize aspiration risk  Description: Interventions:  - Patient should be alert and upright for all feedings (90 degrees preferred)  - Offer food and liquids at a slow rate  - No straws  - Encourage small bites of food and small sips of liquid  - Offer pills one at a time, crush or deliver with applesauce as needed  - Discontinue feeding and notify MD (or speech pathologist) if coughing or persistent throat clearing or wet/gurgly vocal quality is noted  Outcome: Progressing     Problem: Patient/Family Goals  Goal: Patient/Family Long Term Goal  Description: Patient's Long Term Goal: comfort    Interventions:  - Follow POC  - meds as ordered  - See additional Care Plan goals for specific interventions  Outcome: Progressing  Goal: Patient/Family Short Term Goal  Description: Patient's Short Term Goal: improved agitation    Interventions:   - follow POC  - meds as ordered  - PRN meds as needed  - See additional Care Plan goals for specific interventions  Outcome: Progressing

## 2025-07-15 NOTE — PROGRESS NOTES
[facility] Hospitalist Progress Note     CC: Hospital Follow up    PCP: Jimbo Fowler MD       Assessment/Plan:     Active Problems:    Cerebral atherosclerosis    Mr. Shoemaker is a 89 year old male with PMH advanced dementia with history of behavioral disorders and psychosis, hypotension recently presented for profound dehydration and sepsis requiring pressors and significant acute kidney injury and IV fluids ultimately also developed hypoxia and sepsis secondary to urinary tract infection and chronic urinary retention patient was treated medically but prognosis remain guarded ultimately patient was admitted to inpatient hospice.  Comfort care ongoing.      Acute inpatient hospice secondary to advanced dementia and moderate protein calorie malnutrition with behavioral disturbances  -Comfort care ongoing  -Disposition planning per hospice team  Comfortable   - 2.5 mg Valium as needed overnight 7/12 given occasional moaning and sitting up in bed for dyspnea and agitation with irregular labored breathing and apneic pauses despite continued morphine 20 mg/h.  Discussed with nurse caring for patient yesterday during the day and today.  Was not needed and does not appear to be needed currently.  Will leave on board if necessary  - Some reported apneic pauses continuing, some notable air hunger, minimal urine output.   - Scopolamine patch placed given increased secretions, some improvement over past 24 to 48 hours.  Robinul for secretions.  - Inpatient hospice with discussions of transfer to long-term care facility pending symptom management and possible transition to Dilaudid from morphine given morphine shortages.  For now, morphine and Ativan continue.      DNR comfort confirmed with son     FN:  - IVF: none  - Diet: General Diet    Lines: Peripheral IV  Dispo: inpatient hospice, will likely pass in the next 24-48 hours    Questions/concerns were discussed with patient and/or family by bedside.    Gilmer  MD Mac  Hospitalist with [facility]     Subjective:     Unresponsive. Patient with no significant changes over the past day.  Patient appears to be comfortable overall, apnea with her hunger noted.  Morphine drip in place.    OBJECTIVE:    Blood pressure (!) 70/55, pulse 68, temperature 97.6 °F (36.4 °C), temperature source Temporal, resp. rate 10, SpO2 (!) 81%.    Temp:  [97.6 °F (36.4 °C)-99 °F (37.2 °C)] 97.6 °F (36.4 °C)  Pulse:  [68] 68  Resp:  [8-10] 10  BP: (70-83)/(51-55) 70/55  SpO2:  [81 %] 81 %      Intake/Output:    Intake/Output Summary (Last 24 hours) at 7/15/2025 0802  Last data filed at 7/15/2025 0757  Gross per 24 hour   Intake 194.15 ml   Output 250 ml   Net -55.85 ml       Last 3 Weights   07/02/25 0611 131 lb 9.6 oz (59.7 kg)   07/01/25 0600 154 lb 9.6 oz (70.1 kg)   06/30/25 0900 150 lb 3.2 oz (68.1 kg)   06/29/25 1800 145 lb (65.8 kg)   08/02/23 1319 111 lb 8 oz (50.6 kg)   07/20/23 0019 117 lb 4.8 oz (53.2 kg)   07/19/23 2345 117 lb 4.8 oz (53.2 kg)   07/18/23 1039 126 lb 6.4 oz (57.3 kg)   07/03/23 1958 141 lb 8.6 oz (64.2 kg)   07/03/23 1357 145 lb (65.8 kg)       Exam   Gen: Lethargic laying in bed, unresponsive  Pulm: Lungs clear.  No apneic pauses observed  CV: Heart with regular rate.  Pulses intact. no lower extremity edema.      Data Review:       Labs:     No results for input(s): \"RBC\", \"HGB\", \"HCT\", \"MCV\", \"MCH\", \"MCHC\", \"RDW\", \"NEPRELIM\", \"WBC\", \"PLT\" in the last 168 hours.        No results for input(s): \"GLU\", \"BUN\", \"CREATSERUM\", \"GFRAA\", \"GFRNAA\", \"EGFRCR\", \"CA\", \"NA\", \"K\", \"CL\", \"CO2\" in the last 168 hours.      No results for input(s): \"ALT\", \"AST\", \"ALB\", \"AMYLASE\", \"LIPASE\", \"LDH\" in the last 168 hours.    Invalid input(s): \"ALPHOS\", \"TBIL\", \"DBIL\", \"TPROT\"      Imaging:  No results found.      Meds:     Scheduled Medications[1]  Medication Infusions[2]  PRN Medications[3]                         [1]    LORazepam Intensol  1 mg Sublingual q6h    scopolamine  1  patch Transdermal Q72H   [2]    morphINE in sodium chloride 0.9% 8 mg/hr (07/15/25 0150)   [3]   morphINE in sodium chloride 0.9%    diazepam    bisacodyl    [DISCONTINUED] LORazepam **OR** LORazepam **OR** LORazepam    morphINE **OR** morphINE    acetaminophen    OLANZapine    sodium chloride 0.9%    morphINE    haloperidol lactate **OR** haloperidol lactate    glycopyrrolate    ondansetron **OR** ondansetron

## 2025-07-15 NOTE — PLAN OF CARE
Comfort care measures continued. Scope patch behind L ear. Morphine gtt. PRN morphine IVP for air thirst. Junior in place.

## 2025-07-15 NOTE — PLAN OF CARE
Patient appears to be resting comfortably. Unresponsive.  Morphine gtt continued at 8mg/hr overnight. Junior in place. Comfort measures continued.     Problem: Risk for Violence/Aggression  Goal: Absence of Violence/Aggression  Description: INTERVENTIONS:   - Identify precipitating factors for behavior   - Notify Charge RN/Provider   - Consider decreasing stimulation   - Consider distraction measures   - Consider discussion with provider regarding prn meds    - Consider URMILA (Moderate Risk only)   - Consider Code Support (High Risk only)   - Consider room safety checks   - Consider restraints  Outcome: Progressing     Problem: PAIN - ADULT  Goal: Verbalizes/displays adequate comfort level or patient's stated pain goal  Description: INTERVENTIONS:  - Encourage pt to monitor pain and request assistance  - Assess pain using appropriate pain scale  - Administer analgesics based on type and severity of pain and evaluate response  - Implement non-pharmacological measures as appropriate and evaluate response  - Consider cultural and social influences on pain and pain management  - Manage/alleviate anxiety  - Utilize distraction and/or relaxation techniques  - Monitor for opioid side effects  - Notify MD/LIP if interventions unsuccessful or patient reports new pain  - Anticipate increased pain with activity and pre-medicate as appropriate  Outcome: Progressing     Problem: SAFETY ADULT - FALL  Goal: Free from fall injury  Description: INTERVENTIONS:  - Assess pt frequently for physical needs  - Identify cognitive and physical deficits and behaviors that affect risk of falls.  - [place] fall precautions as indicated by assessment.  - Educate pt/family on patient safety including physical limitations  - Instruct pt to call for assistance with activity based on assessment  - Modify environment to reduce risk of injury  - Provide assistive devices as appropriate  - Consider OT/PT consult to assist with  strengthening/mobility  - Encourage toileting schedule  Outcome: Progressing     Problem: SKIN/TISSUE INTEGRITY - ADULT  Goal: Skin integrity remains intact  Description: INTERVENTIONS  - Assess and document risk factors for pressure ulcer development  - Assess and document skin integrity  - Monitor for areas of redness and/or skin breakdown  - Initiate interventions, skin care algorithm/standards of care as needed  Outcome: Progressing     Problem: Impaired Swallowing  Goal: Minimize aspiration risk  Description: Interventions:  - Patient should be alert and upright for all feedings (90 degrees preferred)  - Offer food and liquids at a slow rate  - No straws  - Encourage small bites of food and small sips of liquid  - Offer pills one at a time, crush or deliver with applesauce as needed  - Discontinue feeding and notify MD (or speech pathologist) if coughing or persistent throat clearing or wet/gurgly vocal quality is noted  Outcome: Progressing     Problem: Patient/Family Goals  Goal: Patient/Family Long Term Goal  Description: Patient's Long Term Goal: comfort    Interventions:  - Follow POC  - meds as ordered  - See additional Care Plan goals for specific interventions  Outcome: Progressing  Goal: Patient/Family Short Term Goal  Description: Patient's Short Term Goal: improved agitation    Interventions:   - follow POC  - meds as ordered  - PRN meds as needed  - See additional Care Plan goals for specific interventions  Outcome: Progressing

## 2025-07-15 NOTE — HOSPICE RN NOTE
[facility] Inpatient Nursing Rounds:  No family at bedside during assessment. Pt minimally responsive. RR 6 labored and Apnea up to 35-40 sec at times. Secretions better controlled during this assessment.  RH RN moved Stacie left arm to be better supported on pillow and in doing so Hamzah started to twitch open his eyes and attempt to lift head. Experiencing air hunger and gasping for air after apnea events. Recommended to Tatyana that he would benefit from a push of Ativan and if air hunger continues then increase the gtt to 9 mg/hr.      Medications last 24 hours:  Morphine gtt 8mg/hr for dyspnea and pain  Robinul 0.4mg IVP x2 for secretions  Ativan 1 mg IVP x 1 for agitation  Morphine 2 mg IVP x1 for pain and dyspnea     PPS: 10%    Patient remains eligible for general inpatient hospice care for symptom management of Pain , Dyspnea,  and agitation requiring frequent nursing assessments and interventions including titration of IV medications. POC discussed with Tatyana MURCIA, Family, and Dr Watts. All in agreement. [facility] will continue to support the patient and family.     Afternoon Rounds:  During afternoon round RR 8 labored with continued significant air hunger after 30-45 sec of apnea. Ativan given after morning assessment and RR quality remains the same. Recommended to Tatyana MURCIA that patient could benefit from increasing morphine gtt to 9 mg/hr. Tatyana MURCIA said she would prefer to assess him and give him a possible morphine IVP for now. Will come back and reassess for any improvement and to make further recommendations if needed. Will update son.     Destiny Jeffery BSN, RN  Transitional Nurse Liaison  [facility]  [phone] (After-hours)

## 2025-07-16 NOTE — PLAN OF CARE
Patient appears to be resting comfortably. Morphine gtt continued at 9mg/hr overnight. Prn robinul given for secretions. Junior in place. Comfort measures continued.    Problem: Risk for Violence/Aggression  Goal: Absence of Violence/Aggression  Description: INTERVENTIONS:   - Identify precipitating factors for behavior   - Notify Charge RN/Provider   - Consider decreasing stimulation   - Consider distraction measures   - Consider discussion with provider regarding prn meds    - Consider URMILA (Moderate Risk only)   - Consider Code Support (High Risk only)   - Consider room safety checks   - Consider restraints  Outcome: Progressing     Problem: PAIN - ADULT  Goal: Verbalizes/displays adequate comfort level or patient's stated pain goal  Description: INTERVENTIONS:  - Encourage pt to monitor pain and request assistance  - Assess pain using appropriate pain scale  - Administer analgesics based on type and severity of pain and evaluate response  - Implement non-pharmacological measures as appropriate and evaluate response  - Consider cultural and social influences on pain and pain management  - Manage/alleviate anxiety  - Utilize distraction and/or relaxation techniques  - Monitor for opioid side effects  - Notify MD/LIP if interventions unsuccessful or patient reports new pain  - Anticipate increased pain with activity and pre-medicate as appropriate  Outcome: Progressing     Problem: SAFETY ADULT - FALL  Goal: Free from fall injury  Description: INTERVENTIONS:  - Assess pt frequently for physical needs  - Identify cognitive and physical deficits and behaviors that affect risk of falls.  - [place] fall precautions as indicated by assessment.  - Educate pt/family on patient safety including physical limitations  - Instruct pt to call for assistance with activity based on assessment  - Modify environment to reduce risk of injury  - Provide assistive devices as appropriate  - Consider OT/PT consult to assist with  strengthening/mobility  - Encourage toileting schedule  Outcome: Progressing     Problem: SKIN/TISSUE INTEGRITY - ADULT  Goal: Skin integrity remains intact  Description: INTERVENTIONS  - Assess and document risk factors for pressure ulcer development  - Assess and document skin integrity  - Monitor for areas of redness and/or skin breakdown  - Initiate interventions, skin care algorithm/standards of care as needed  Outcome: Progressing     Problem: Impaired Swallowing  Goal: Minimize aspiration risk  Description: Interventions:  - Patient should be alert and upright for all feedings (90 degrees preferred)  - Offer food and liquids at a slow rate  - No straws  - Encourage small bites of food and small sips of liquid  - Offer pills one at a time, crush or deliver with applesauce as needed  - Discontinue feeding and notify MD (or speech pathologist) if coughing or persistent throat clearing or wet/gurgly vocal quality is noted  Outcome: Progressing     Problem: Patient/Family Goals  Goal: Patient/Family Long Term Goal  Description: Patient's Long Term Goal: comfort    Interventions:  - Follow POC  - meds as ordered  - See additional Care Plan goals for specific interventions  Outcome: Progressing  Goal: Patient/Family Short Term Goal  Description: Patient's Short Term Goal: improved agitation    Interventions:   - follow POC  - meds as ordered  - PRN meds as needed  - See additional Care Plan goals for specific interventions  Outcome: Progressing

## 2025-07-16 NOTE — HOSPICE RN NOTE
[facility] Second rounds:    Since first visit this morning the patient has received one additional dose of Morphine 2mg IVP.    Following conversations in the Hospice teams interdisciplinary care team meeting this RN obtained conversion rate from pharmacy for equivalent rate for Dilaudid drip, and obtained order from Hospice Medical Director Dr. Jacome to change from Morphine drip at the rate of 9mg/hr to Dilaudid drip at the rate of 1.6 mg/hr. With this change there was also a switch from the Morphine 1-4 mg push panel to now having the Dilaudid 0.2-0.8 mg push panel for all breakthrough pain or dyspnea. It was also decided to change scheduled Lorazepam Intensol order to being a PRN order due to the frequency it was being not given due to pt being unable to tolerate.    This RN had full conversation with Simeon Hopkins over the phone and with DIVINA Martinez about changes being implemented, everyone is in full agreement with this plan. All new orders were placed. [facility] will continue to support this patient and family.    James Wesley RN BSN  [facility]  Transitional Nurse Liaison  [phone] / [phone] (after hours)

## 2025-07-16 NOTE — PROGRESS NOTES
[facility] Hospitalist Progress Note     CC: Hospital Follow up    PCP: Jimbo Fowler MD       Assessment/Plan:     Active Problems:    Cerebral atherosclerosis    Mr. Shoemaker is a 89 year old male with PMH advanced dementia with history of behavioral disorders and psychosis, hypotension recently presented for profound dehydration and sepsis requiring pressors and significant acute kidney injury and IV fluids ultimately also developed hypoxia and sepsis secondary to urinary tract infection and chronic urinary retention patient was treated medically but prognosis remain guarded ultimately patient was admitted to inpatient hospice.  Comfort care ongoing.      Acute inpatient hospice secondary to advanced dementia and moderate protein calorie malnutrition with behavioral disturbances  -Comfort care ongoing  -Disposition planning per hospice team  Comfortable   - 2.5 mg Valium as needed overnight 7/12 given occasional moaning and sitting up in bed for dyspnea and agitation with irregular labored breathing and apneic pauses despite continued morphine 20 mg/h.  Discussed with nurse caring for patient yesterday during the day and today.  Was not needed and does not appear to be needed currently.  Will leave on board if necessary  - Some reported apneic pauses continuing, some notable air hunger, minimal urine output.   - Scopolamine patch placed given increased secretions, some improvement over past 24 to 48 hours.  Robinul for secretions.  - Inpatient hospice with discussions of transfer to long-term care facility pending symptom management and possible transition to Dilaudid from morphine given morphine shortages.  For now, morphine and Ativan continue.      DNR comfort confirmed with son     FN:  - IVF: none  - Diet: General Diet    Lines: Peripheral IV  Dispo: inpatient hospice, may pass in the next 24-48 hours    Questions/concerns were discussed with patient and/or family by bedside.    Gilmer Watts,  MD  Hospitalist with [facility]     Subjective:     Unresponsive. Patient with no significant changes over the past day.  Patient appears to be comfortable overall, apnea today on my exam roughly 30seconds.  Morphine drip in place.    OBJECTIVE:    Blood pressure (!) 77/59, pulse 59, temperature 99.7 °F (37.6 °C), temperature source Axillary, resp. rate (!) 8, SpO2 (!) 88%.    Temp:  [99.7 °F (37.6 °C)] 99.7 °F (37.6 °C)  Pulse:  [47-59] 59  Resp:  [8-10] 8  BP: (74-77)/(54-59) 77/59  SpO2:  [75 %-88 %] 88 %      Intake/Output:    Intake/Output Summary (Last 24 hours) at 7/16/2025 0943  Last data filed at 7/16/2025 0729  Gross per 24 hour   Intake 202.6 ml   Output 650 ml   Net -447.4 ml       Last 3 Weights   07/02/25 0611 131 lb 9.6 oz (59.7 kg)   07/01/25 0600 154 lb 9.6 oz (70.1 kg)   06/30/25 0900 150 lb 3.2 oz (68.1 kg)   06/29/25 1800 145 lb (65.8 kg)   08/02/23 1319 111 lb 8 oz (50.6 kg)   07/20/23 0019 117 lb 4.8 oz (53.2 kg)   07/19/23 2345 117 lb 4.8 oz (53.2 kg)   07/18/23 1039 126 lb 6.4 oz (57.3 kg)   07/03/23 1958 141 lb 8.6 oz (64.2 kg)   07/03/23 1357 145 lb (65.8 kg)       Exam   Gen: Lethargic laying in bed, unresponsive  Pulm: Lungs clear.  Some 30s apneic pauses observed  CV: Tachycardiac.  Pulses intact.       Data Review:       Labs:     No results for input(s): \"RBC\", \"HGB\", \"HCT\", \"MCV\", \"MCH\", \"MCHC\", \"RDW\", \"NEPRELIM\", \"WBC\", \"PLT\" in the last 168 hours.        No results for input(s): \"GLU\", \"BUN\", \"CREATSERUM\", \"GFRAA\", \"GFRNAA\", \"EGFRCR\", \"CA\", \"NA\", \"K\", \"CL\", \"CO2\" in the last 168 hours.      No results for input(s): \"ALT\", \"AST\", \"ALB\", \"AMYLASE\", \"LIPASE\", \"LDH\" in the last 168 hours.    Invalid input(s): \"ALPHOS\", \"TBIL\", \"DBIL\", \"TPROT\"      Imaging:  No results found.      Meds:     Scheduled Medications[1]  Medication Infusions[2]  PRN Medications[3]                           [1]    LORazepam Intensol  1 mg Sublingual q6h    scopolamine  1 patch Transdermal Q72H   [2]     morphINE in sodium chloride 0.9% 9 mg/hr (07/16/25 0107)   [3]   morphINE in sodium chloride 0.9%    diazepam    bisacodyl    [DISCONTINUED] LORazepam **OR** LORazepam **OR** LORazepam    morphINE **OR** morphINE    acetaminophen    OLANZapine    sodium chloride 0.9%    morphINE    haloperidol lactate **OR** haloperidol lactate    glycopyrrolate    ondansetron **OR** ondansetron

## 2025-07-16 NOTE — HOSPICE RN NOTE
[facility] Inpatient Nursing Rounds:     Hospice RN and MSW visited patient at bedside. No family present. Will reach out via telephone. Patient unresponsive to verbal stimuli, will facial grimace, furrow brow, clench fist, and occasionally open eyes upon tactile stimuli and/or movement. He remains on Morphine drip which was increased to 9 mg/hr last night. Breathing labored, shallow, irregular, RR 8, diminished in all fields with use of neck and abdominal accessory muscles. Pt is having apneic pauses from 45-60 seconds, during my assessment I witnessed a 45 second pause. Skin cool, pale. Indwelling urinary catheter remains, draining minimal brown colored urine. LBM: 7/16/25. Bowel sounds hypoactive in all quadrants.     Reviewed symptom management over the past 24 hours: Scopolamine patch in place behind left ear for airway secretions. Morphine infusion currently at 9 mg/hr for pain and dyspnea. PRN medications given include: Robinul 0.4mg IVP x2 for excessive secretions, and Ativan 1mg IVP x2 for restlessness, morphine 2mg IVP x3 for pain and dyspnea    Pt has had Lorazepam Intensol 1mg scheduled [id number] to be given sublingually as an attempt to trial home meds for a possible transition to routine level of care. Per nursing staff the last 19 doses have been unable to be given due to pt not tolerating this route.    During todays visit the pt presented with these changes over the past 24 hours or with persistent symptoms that continue to require frequent addressing of: increasingly frail, increased temporal wasting, respiratory pattern of 45-60 second pauses followed by short periods of labored breathing, upon tactile stimuli and/or movement pt is having facial grimace, furrowed brown, dyspnea, clenched fist/tense muscles, and occasionally will open his eyes and attempt to grab onto the person who is assisting him. All of these new or persistent symptoms are showing signs of on-going pain, dyspnea, and  anxiety that require continued management and continued eligibility for the general inpatient level of hospice care.     PPS: 10%     Patient receiving general inpatient hospice care for symptom management of pain, dyspnea, anxiety, and airway secretions requiring frequent nursing assessments and interventions including the administration and titration of IV medications per Dr. Watts and Dr. Jacome. Plan to transfer to routine level of care with CADD pump at LTC facility, pending symptom management. Will reassess daily for maintained symptom management of all symptoms. Will discuss potential switch to Dilaudid drip during our hospice interdisciplinary care team meeting later this afternoon. Current Morphine shortage for CADD pump will require transition to Dilaudid drip at equivalent rate, I also believe that a switch to Dilaudid could provide the patient with more complete symptom management and could potentially require less frequent IVP medications. POC discussed with Tatyana Martinez RN at bedside, in agreement. [facility] will continue to support the patient and family.    James Wesley RN BSN  [facility]  Transitional Nurse Liaison  [phone] / [phone] (after hours)

## 2025-07-17 NOTE — PLAN OF CARE
Patient appears to be resting comfortably. Dilaudid gtt continued at 1.6mg/hr. Junior in place. Comfort measures continued.     Problem: Risk for Violence/Aggression  Goal: Absence of Violence/Aggression  Description: INTERVENTIONS:   - Identify precipitating factors for behavior   - Notify Charge RN/Provider   - Consider decreasing stimulation   - Consider distraction measures   - Consider discussion with provider regarding prn meds    - Consider URMILA (Moderate Risk only)   - Consider Code Support (High Risk only)   - Consider room safety checks   - Consider restraints  Outcome: Progressing     Problem: PAIN - ADULT  Goal: Verbalizes/displays adequate comfort level or patient's stated pain goal  Description: INTERVENTIONS:  - Encourage pt to monitor pain and request assistance  - Assess pain using appropriate pain scale  - Administer analgesics based on type and severity of pain and evaluate response  - Implement non-pharmacological measures as appropriate and evaluate response  - Consider cultural and social influences on pain and pain management  - Manage/alleviate anxiety  - Utilize distraction and/or relaxation techniques  - Monitor for opioid side effects  - Notify MD/LIP if interventions unsuccessful or patient reports new pain  - Anticipate increased pain with activity and pre-medicate as appropriate  Outcome: Progressing     Problem: SAFETY ADULT - FALL  Goal: Free from fall injury  Description: INTERVENTIONS:  - Assess pt frequently for physical needs  - Identify cognitive and physical deficits and behaviors that affect risk of falls.  - [place] fall precautions as indicated by assessment.  - Educate pt/family on patient safety including physical limitations  - Instruct pt to call for assistance with activity based on assessment  - Modify environment to reduce risk of injury  - Provide assistive devices as appropriate  - Consider OT/PT consult to assist with strengthening/mobility  - Encourage toileting  schedule  Outcome: Progressing     Problem: SKIN/TISSUE INTEGRITY - ADULT  Goal: Skin integrity remains intact  Description: INTERVENTIONS  - Assess and document risk factors for pressure ulcer development  - Assess and document skin integrity  - Monitor for areas of redness and/or skin breakdown  - Initiate interventions, skin care algorithm/standards of care as needed  Outcome: Progressing     Problem: Impaired Swallowing  Goal: Minimize aspiration risk  Description: Interventions:  - Patient should be alert and upright for all feedings (90 degrees preferred)  - Offer food and liquids at a slow rate  - No straws  - Encourage small bites of food and small sips of liquid  - Offer pills one at a time, crush or deliver with applesauce as needed  - Discontinue feeding and notify MD (or speech pathologist) if coughing or persistent throat clearing or wet/gurgly vocal quality is noted  Outcome: Progressing     Problem: Patient/Family Goals  Goal: Patient/Family Long Term Goal  Description: Patient's Long Term Goal: comfort    Interventions:  - Follow POC  - meds as ordered  - See additional Care Plan goals for specific interventions  Outcome: Progressing  Goal: Patient/Family Short Term Goal  Description: Patient's Short Term Goal: improved agitation    Interventions:   - follow POC  - meds as ordered  - PRN meds as needed  - See additional Care Plan goals for specific interventions  Outcome: Progressing

## 2025-07-17 NOTE — PROGRESS NOTES
[facility] Hospitalist Progress Note     CC: Hospital Follow up    PCP: Jimbo Fowler MD       Assessment/Plan:     Active Problems:    Cerebral atherosclerosis    Mr. Shoemaker is a 89 year old male with PMH advanced dementia with history of behavioral disorders and psychosis, hypotension recently presented for profound dehydration and sepsis requiring pressors and significant acute kidney injury and IV fluids ultimately also developed hypoxia and sepsis secondary to urinary tract infection and chronic urinary retention patient was treated medically but prognosis remain guarded ultimately patient was admitted to inpatient hospice.  Comfort care ongoing.      Acute inpatient hospice secondary to advanced dementia and moderate protein calorie malnutrition with behavioral disturbances  -Comfort care ongoing  -Disposition planning per hospice team  Comfortable   - 2.5 mg Valium as needed overnight 7/12 given occasional moaning and sitting up in bed for dyspnea and agitation with irregular labored breathing and apneic pauses despite continued morphine 20 mg/h.  Discussed with nurse caring for patient yesterday during the day and today.  Was not needed and does not appear to be needed currently.  Will leave on board if necessary  - Some reported apneic pauses continuing, some notable air hunger, minimal urine output.   - Scopolamine patch placed given increased secretions, some improvement over past 24 to 48 hours.  Robinul for secretions.  - Patient is transition to Dilaudid from morphine    DNR comfort confirmed with son     FN:  - IVF: none  - Diet: General Diet    Lines: Peripheral IV  Dispo: inpatient hospice, may pass in the next 24-48 hours    Questions/concerns were discussed with patient and/or family by bedside.    Gilmer Watts MD  Hospitalist with [facility]     Subjective:     Unresponsive. Patient with no significant changes over the past day.  Patient appears to be comfortable overall,  apnea today on my exam roughly 30 to 40 seconds.  Morphine drip in place.    OBJECTIVE:    Blood pressure (!) 70/52, pulse 59, temperature 98.7 °F (37.1 °C), temperature source Axillary, resp. rate 12, SpO2 100%.    Temp:  [97.4 °F (36.3 °C)-98.7 °F (37.1 °C)] 98.7 °F (37.1 °C)  Pulse:  [] 59  Resp:  [8-12] 12  BP: (70-72)/(52-57) 70/52  SpO2:  [86 %-100 %] 100 %      Intake/Output:    Intake/Output Summary (Last 24 hours) at 7/17/2025 0915  Last data filed at 7/17/2025 0713  Gross per 24 hour   Intake 99.05 ml   Output 365 ml   Net -265.95 ml       Last 3 Weights   07/02/25 0611 131 lb 9.6 oz (59.7 kg)   07/01/25 0600 154 lb 9.6 oz (70.1 kg)   06/30/25 0900 150 lb 3.2 oz (68.1 kg)   06/29/25 1800 145 lb (65.8 kg)   08/02/23 1319 111 lb 8 oz (50.6 kg)   07/20/23 0019 117 lb 4.8 oz (53.2 kg)   07/19/23 2345 117 lb 4.8 oz (53.2 kg)   07/18/23 1039 126 lb 6.4 oz (57.3 kg)   07/03/23 1958 141 lb 8.6 oz (64.2 kg)   07/03/23 1357 145 lb (65.8 kg)       Exam   Gen: Lethargic laying in bed, unresponsive  Pulm: Lungs clear.  Some 30s apneic pauses observed  CV: Tachycardiac.  Pulses intact.       Data Review:       Labs:     No results for input(s): \"RBC\", \"HGB\", \"HCT\", \"MCV\", \"MCH\", \"MCHC\", \"RDW\", \"NEPRELIM\", \"WBC\", \"PLT\" in the last 168 hours.        No results for input(s): \"GLU\", \"BUN\", \"CREATSERUM\", \"GFRAA\", \"GFRNAA\", \"EGFRCR\", \"CA\", \"NA\", \"K\", \"CL\", \"CO2\" in the last 168 hours.      No results for input(s): \"ALT\", \"AST\", \"ALB\", \"AMYLASE\", \"LIPASE\", \"LDH\" in the last 168 hours.    Invalid input(s): \"ALPHOS\", \"TBIL\", \"DBIL\", \"TPROT\"      Imaging:  No results found.      Meds:     Scheduled Medications[1]  Medication Infusions[2]  PRN Medications[3]                             [1]    scopolamine  1 patch Transdermal Q72H   [2]    HYDROmorphone in sodium chloride 0.9% 1.6 mg/hr (07/17/25 0627)   [3]   atropine    glycerin-hypromellose-    haloperidol lactate **OR** haloperidol lactate    [DISCONTINUED]  LORazepam **OR** LORazepam **OR** LORazepam    LORazepam Intensol    HYDROmorphone in sodium chloride 0.9%    HYDROmorphone **OR** HYDROmorphone **OR** HYDROmorphone    diazepam    bisacodyl    acetaminophen    OLANZapine    sodium chloride 0.9%    glycopyrrolate    ondansetron **OR** ondansetron

## 2025-07-17 NOTE — PLAN OF CARE
Responds to painful stimuli. Reached out during linen changed this am after bm smear. PRN ativan and valium for terminal restlessness. Gtt switched to dilaudid. PRN robinul for secretions. Junior in place.   Problem: PAIN - ADULT  Goal: Verbalizes/displays adequate comfort level or patient's stated pain goal  Description: INTERVENTIONS:  - Encourage pt to monitor pain and request assistance  - Assess pain using appropriate pain scale  - Administer analgesics based on type and severity of pain and evaluate response  - Implement non-pharmacological measures as appropriate and evaluate response  - Consider cultural and social influences on pain and pain management  - Manage/alleviate anxiety  - Utilize distraction and/or relaxation techniques  - Monitor for opioid side effects  - Notify MD/LIP if interventions unsuccessful or patient reports new pain  - Anticipate increased pain with activity and pre-medicate as appropriate  Outcome: Progressing

## 2025-07-17 NOTE — HOSPICE RN NOTE
[facility] Inpatient Nursing Rounds:     Hospice RN and LSW visited patient at the bedside. No family present during assessment. Patient remains minimally responsive, with some facial grimacing and arm movement in response to physical touch only. Open mouth breathing on room air. RR 8-10 irregular and shallow with 10-15 seconds of apnea. Lungs sound diminished throughout. Skin is pale and cool, no mottling noted. Patient appears very cachectic and frail with temporal wasting. Junior remains intact draining scant brown urine. LBM: 7/16/25.      Reviewed symptom management over the past 24 hours: Scopolamine patch remains intact behind right ear for airway secretions. Hydromorphone continuous drip remains at 1.6 mg/hr for dyspnea and pain. Lorazepam 1 mg IVP x1 for agitation and restlessness.     PPS: 10%    Patient remains eligible for general inpatient hospice care for symptom management of dyspnea, pain, and agitation requiring frequent nursing assessments and interventions including titration of IV medications. Eventual discharge to [facility] for routine hospice pending clinical course and symptom management. Patient did not tolerate PO lorazepam trial. Morphine drip switched to Dilaudid for potential CADD pump upon dc. POC discussed with Danielle MURCIA. RN in agreement. [facility] will continue to support the patient and family.      Second Rounds:    Hospice RN visited patient at the bedside. Patient remains minimally responsive with open mouth breathing. Dilaudid drip continues at 1.6 mg/hr. Patient received Ativan 2 mg IVP this afternoon for restlessness.    Stefany HALLN, RN CHPN  Transitional Nurse Liaison  [facility]  [phone] (After-hours)

## 2025-07-18 NOTE — PLAN OF CARE
Problem: Risk for Violence/Aggression  Goal: Absence of Violence/Aggression  Description: INTERVENTIONS:   - Identify precipitating factors for behavior   - Notify Charge RN/Provider   - Consider decreasing stimulation   - Consider distraction measures   - Consider discussion with provider regarding prn meds    - Consider URMILA (Moderate Risk only)   - Consider Code Support (High Risk only)   - Consider room safety checks   - Consider restraints  Outcome: Progressing     Problem: PAIN - ADULT  Goal: Verbalizes/displays adequate comfort level or patient's stated pain goal  Description: INTERVENTIONS:  - Encourage pt to monitor pain and request assistance  - Assess pain using appropriate pain scale  - Administer analgesics based on type and severity of pain and evaluate response  - Implement non-pharmacological measures as appropriate and evaluate response  - Consider cultural and social influences on pain and pain management  - Manage/alleviate anxiety  - Utilize distraction and/or relaxation techniques  - Monitor for opioid side effects  - Notify MD/LIP if interventions unsuccessful or patient reports new pain  - Anticipate increased pain with activity and pre-medicate as appropriate  Outcome: Progressing     Problem: SAFETY ADULT - FALL  Goal: Free from fall injury  Description: INTERVENTIONS:  - Assess pt frequently for physical needs  - Identify cognitive and physical deficits and behaviors that affect risk of falls.  - [place] fall precautions as indicated by assessment.  - Educate pt/family on patient safety including physical limitations  - Instruct pt to call for assistance with activity based on assessment  - Modify environment to reduce risk of injury  - Provide assistive devices as appropriate  - Consider OT/PT consult to assist with strengthening/mobility  - Encourage toileting schedule  Outcome: Progressing     Problem: SKIN/TISSUE INTEGRITY - ADULT  Goal: Skin integrity remains intact  Description:  INTERVENTIONS  - Assess and document risk factors for pressure ulcer development  - Assess and document skin integrity  - Monitor for areas of redness and/or skin breakdown  - Initiate interventions, skin care algorithm/standards of care as needed  Outcome: Progressing     Problem: Impaired Swallowing  Goal: Minimize aspiration risk  Description: Interventions:  - Patient should be alert and upright for all feedings (90 degrees preferred)  - Offer food and liquids at a slow rate  - No straws  - Encourage small bites of food and small sips of liquid  - Offer pills one at a time, crush or deliver with applesauce as needed  - Discontinue feeding and notify MD (or speech pathologist) if coughing or persistent throat clearing or wet/gurgly vocal quality is noted  Outcome: Progressing     Problem: Patient/Family Goals  Goal: Patient/Family Long Term Goal  Description: Patient's Long Term Goal: comfort    Interventions:  - Follow POC  - meds as ordered  - See additional Care Plan goals for specific interventions  Outcome: Progressing  Goal: Patient/Family Short Term Goal  Description: Patient's Short Term Goal: improved agitation    Interventions:   - follow POC  - meds as ordered  - PRN meds as needed  - See additional Care Plan goals for specific interventions  Outcome: Progressing

## 2025-07-18 NOTE — PLAN OF CARE
Problem: PAIN - ADULT  Goal: Verbalizes/displays adequate comfort level or patient's stated pain goal  Description: INTERVENTIONS:  - Encourage pt to monitor pain and request assistance  - Assess pain using appropriate pain scale  - Administer analgesics based on type and severity of pain and evaluate response  - Implement non-pharmacological measures as appropriate and evaluate response  - Consider cultural and social influences on pain and pain management  - Manage/alleviate anxiety  - Utilize distraction and/or relaxation techniques  - Monitor for opioid side effects  - Notify MD/LIP if interventions unsuccessful or patient reports new pain  - Anticipate increased pain with activity and pre-medicate as appropriate  Outcome: Progressing     Problem: SAFETY ADULT - FALL  Goal: Free from fall injury  Description: INTERVENTIONS:  - Assess pt frequently for physical needs  - Identify cognitive and physical deficits and behaviors that affect risk of falls.  - [place] fall precautions as indicated by assessment.  - Educate pt/family on patient safety including physical limitations  - Instruct pt to call for assistance with activity based on assessment  - Modify environment to reduce risk of injury  - Provide assistive devices as appropriate  - Consider OT/PT consult to assist with strengthening/mobility  - Encourage toileting schedule  Outcome: Progressing     Problem: SKIN/TISSUE INTEGRITY - ADULT  Goal: Skin integrity remains intact  Description: INTERVENTIONS  - Assess and document risk factors for pressure ulcer development  - Assess and document skin integrity  - Monitor for areas of redness and/or skin breakdown  - Initiate interventions, skin care algorithm/standards of care as needed  Outcome: Progressing     Problem: Impaired Swallowing  Goal: Minimize aspiration risk  Description: Interventions:  - Patient should be alert and upright for all feedings (90 degrees preferred)  - Offer food and liquids at a  slow rate  - No straws  - Encourage small bites of food and small sips of liquid  - Offer pills one at a time, crush or deliver with applesauce as needed  - Discontinue feeding and notify MD (or speech pathologist) if coughing or persistent throat clearing or wet/gurgly vocal quality is noted  Outcome: Progressing     Problem: Patient/Family Goals  Goal: Patient/Family Long Term Goal  Description: Patient's Long Term Goal: comfort    Interventions:  - Follow POC  - meds as ordered  - See additional Care Plan goals for specific interventions  Outcome: Progressing  Goal: Patient/Family Short Term Goal  Description: Patient's Short Term Goal: improved agitation    Interventions:   - follow POC  - meds as ordered  - PRN meds as needed  - See additional Care Plan goals for specific interventions  Outcome: Progressing     Pt remains fairly comfortable throughout the shift, occasional furrowing his brow and grimacing with repositioning. Did have an episode of increased restlessness after having a bowel movement, prn ativan given. Dilaudid gtt order increased to 2mg per hospice. Repositioning as tolerated. PRN oral care. All comfort and safety precautions put in place.

## 2025-07-18 NOTE — HOSPICE RN NOTE
[facility] Inpatient Nursing Rounds:     Assessed patient at bedside, family not present, will reach out via telephone. Patient has negative response to repositioning via tactile stimulus, moaning after movement of extremities. Temporal and clavicular wasting noted with furrowed brow, generalized pallor and cool to touch. Edema noted to right upper extremity and elevated, bilateral pitting edema to BLE with mottling. Bilateral fists clenched. Scopolamine patch in place. RR 10 , irregular, with apneic pauses followed by deep gasping, labored with accessory muscle use and occasional moaning noted on room air open mouth breathing. LBM: 7/16. Bowel sounds hypoactive in all quadrants. Junior intact draining small brown, cloudy, malodorous urine. Bed confined. PPS: 10 %    Dilaudid drip running at 1.6 mg/hr at time of assessment, spoke with floor RN Danielle Meyers regarding available prn dose of IVP Dilaudid and titration of Dilaudid gtt to help ease pain/labored breathing. RN in agreement with this plan. Reviewed symptom management over the past 24 hours: Scopolamine patch in place for airway secretions. PRN Medications given include: IV Lorazepam 1mgx1 and IV Lorazepam 2mgx1 for agitation.     Patient receiving general inpatient hospice care for symptom management of pain, dyspnea and agitation requiring frequent nursing assessments and interventions including the administration and titration of IV medications per Dr. Zhen Simpson and Dr. Sonny Jacome. Potential discharge to LTC on hospice pending baseline dyspnea/pain intervention. Possible CADD pump at discharge. End of Life education provided along with discussion with family regarding daily assessment of GIP eligibility, to ensure treatment of symptoms and management of care is done at an appropriate level of care. POC discussed with Sandeep awad and Danielle MURCIA. Both in agreement. [facility] will continue to follow this patient closely for end of life  while supporting patient and family.    Jeanne HALLN, RN, CHPN  Transitional Nurse Liaison  [facility]  [phone] (After-hours)

## 2025-07-18 NOTE — PROGRESS NOTES
[facility] Hospitalist Progress Note     CC: Hospital Follow up    PCP: Jimbo Fowler MD       Assessment/Plan:     Active Problems:    Cerebral atherosclerosis    Mr. Shoemaker is a 89 year old male with PMH advanced dementia with history of behavioral disorders and psychosis, hypotension recently presented for profound dehydration and sepsis requiring pressors and significant acute kidney injury and IV fluids ultimately also developed hypoxia and sepsis secondary to urinary tract infection and chronic urinary retention patient was treated medically but prognosis remain guarded ultimately patient was admitted to inpatient hospice.  Comfort care ongoing.      Acute inpatient hospice secondary to advanced dementia and moderate protein calorie malnutrition with behavioral disturbances  -Comfort care ongoing  -Disposition planning per hospice team  -Patient is on Dilaudid  drip  -appears comfortable    DNR comfort       FN:  - IVF: none  - Diet: General Diet    Lines: Peripheral IV  Dispo: inpatient hospice,        Zhen Simpson MD  Hospitalist with [facility]     Subjective:     Unresponsive. Appears comfortable     OBJECTIVE:    Blood pressure (!) 76/45, pulse 71, temperature 97.6 °F (36.4 °C), temperature source Axillary, resp. rate 14, SpO2 (!) 85%.    Temp:  [97.6 °F (36.4 °C)-99 °F (37.2 °C)] 97.6 °F (36.4 °C)  Pulse:  [64-71] 71  Resp:  [14] 14  BP: (76-80)/(45-56) 76/45  SpO2:  [85 %-86 %] 85 %      Intake/Output:    Intake/Output Summary (Last 24 hours) at 7/18/2025 0748  Last data filed at 7/18/2025 0726  Gross per 24 hour   Intake 192.85 ml   Output 150 ml   Net 42.85 ml       Last 3 Weights   07/02/25 0611 131 lb 9.6 oz (59.7 kg)   07/01/25 0600 154 lb 9.6 oz (70.1 kg)   06/30/25 0900 150 lb 3.2 oz (68.1 kg)   06/29/25 1800 145 lb (65.8 kg)   08/02/23 1319 111 lb 8 oz (50.6 kg)   07/20/23 0019 117 lb 4.8 oz (53.2 kg)   07/19/23 2345 117 lb 4.8 oz (53.2 kg)   07/18/23 1039 126 lb 6.4 oz (57.3  kg)   07/03/23 1958 141 lb 8.6 oz (64.2 kg)   07/03/23 1357 145 lb (65.8 kg)       Exam   Gen: Lethargic laying in bed, unresponsive  Pulm: Lungs clear.    CV: Tachycardiac.         Data Review:       Labs:     No results for input(s): \"RBC\", \"HGB\", \"HCT\", \"MCV\", \"MCH\", \"MCHC\", \"RDW\", \"NEPRELIM\", \"WBC\", \"PLT\" in the last 168 hours.        No results for input(s): \"GLU\", \"BUN\", \"CREATSERUM\", \"GFRAA\", \"GFRNAA\", \"EGFRCR\", \"CA\", \"NA\", \"K\", \"CL\", \"CO2\" in the last 168 hours.      No results for input(s): \"ALT\", \"AST\", \"ALB\", \"AMYLASE\", \"LIPASE\", \"LDH\" in the last 168 hours.    Invalid input(s): \"ALPHOS\", \"TBIL\", \"DBIL\", \"TPROT\"      Imaging:  No results found.      Meds:     Scheduled Medications[1]  Medication Infusions[2]  PRN Medications[3]                               [1]    scopolamine  1 patch Transdermal Q72H   [2]    HYDROmorphone in sodium chloride 0.9% 1.6 mg/hr (07/18/25 0547)   [3]   atropine    glycerin-hypromellose-    haloperidol lactate **OR** haloperidol lactate    [DISCONTINUED] LORazepam **OR** LORazepam **OR** LORazepam    LORazepam Intensol    HYDROmorphone in sodium chloride 0.9%    HYDROmorphone **OR** HYDROmorphone **OR** HYDROmorphone    diazepam    bisacodyl    acetaminophen    OLANZapine    sodium chloride 0.9%    glycopyrrolate    ondansetron **OR** ondansetron

## 2025-07-19 NOTE — HOSPICE RN NOTE
[facility] Inpatient Nursing Rounds:     Assessed patient at bedside, family not present, will reach out via telephone. Patient is unresponsive to verbal and tactile stimulus. Scopolamine patch in place. RR 6, with 30 second apneic pause followed by 6 deep, loud, labored open mouth breaths on room air. Air hunger, labored irregular breaths with diminished breath sounds. Periorbital and clavicular wasting noted. Generalized pallor, skin cool to touch, mottling to BLE. LBM: 7/16. Bowel sounds hypoactive in all quadrants. Bed confined.  PPS: 10 %    Reviewed symptom management over the past 24 hours: Scopolamine patch in place for airway secretions. Dilaudid infusion increased to 2.0 mg/hour incrementally over shift for pain/dyspnea. PRN Medications given include: IV Ativan 2mgx3 for agitation.     Patient receiving general inpatient hospice care for symptom management of pain, dyspnea and agitation requiring frequent nursing assessments and interventions including the administration and titration of IV medications per Dr. Zhen Simpson and Dr. Sonny Jacome. Potential discharge to LTC on hospice pending baseline dyspnea, pain and agitation intervention. Possible CADD pump at discharge. End of Life education provided along with discussion with family regarding daily assessment of GIP eligibility, to ensure treatment of symptoms and management of care is done at an appropriate level of care. POC discussed with Sandeep awad and Stefany Lindsey RN. Both in agreement. [facility] will continue to follow this patient closely for end of life while supporting patient and family.    Jeanne CASAS, RN, PN  Transitional Nurse Liaison  [facility]  [phone] (After-hours)

## 2025-07-19 NOTE — PLAN OF CARE
Unresponsive, Dilaudid gtt, ativan 1x, repositioned, oral care,   Room air, frequent rounding, family updated on care plan.    Problem: Risk for Violence/Aggression  Goal: Absence of Violence/Aggression  Description: INTERVENTIONS:   - Identify precipitating factors for behavior   - Notify Charge RN/Provider   - Consider decreasing stimulation   - Consider distraction measures   - Consider discussion with provider regarding prn meds    - Consider URMILA (Moderate Risk only)   - Consider Code Support (High Risk only)   - Consider room safety checks   - Consider restraints  Outcome: Progressing     Problem: PAIN - ADULT  Goal: Verbalizes/displays adequate comfort level or patient's stated pain goal  Description: INTERVENTIONS:  - Encourage pt to monitor pain and request assistance  - Assess pain using appropriate pain scale  - Administer analgesics based on type and severity of pain and evaluate response  - Implement non-pharmacological measures as appropriate and evaluate response  - Consider cultural and social influences on pain and pain management  - Manage/alleviate anxiety  - Utilize distraction and/or relaxation techniques  - Monitor for opioid side effects  - Notify MD/LIP if interventions unsuccessful or patient reports new pain  - Anticipate increased pain with activity and pre-medicate as appropriate  Outcome: Progressing     Problem: SAFETY ADULT - FALL  Goal: Free from fall injury  Description: INTERVENTIONS:  - Assess pt frequently for physical needs  - Identify cognitive and physical deficits and behaviors that affect risk of falls.  - [place] fall precautions as indicated by assessment.  - Educate pt/family on patient safety including physical limitations  - Instruct pt to call for assistance with activity based on assessment  - Modify environment to reduce risk of injury  - Provide assistive devices as appropriate  - Consider OT/PT consult to assist with strengthening/mobility  - Encourage toileting  schedule  Outcome: Progressing     Problem: SKIN/TISSUE INTEGRITY - ADULT  Goal: Skin integrity remains intact  Description: INTERVENTIONS  - Assess and document risk factors for pressure ulcer development  - Assess and document skin integrity  - Monitor for areas of redness and/or skin breakdown  - Initiate interventions, skin care algorithm/standards of care as needed  Outcome: Progressing     Problem: Impaired Swallowing  Goal: Minimize aspiration risk  Description: Interventions:  - Patient should be alert and upright for all feedings (90 degrees preferred)  - Offer food and liquids at a slow rate  - No straws  - Encourage small bites of food and small sips of liquid  - Offer pills one at a time, crush or deliver with applesauce as needed  - Discontinue feeding and notify MD (or speech pathologist) if coughing or persistent throat clearing or wet/gurgly vocal quality is noted  Outcome: Progressing     Problem: Patient/Family Goals  Goal: Patient/Family Long Term Goal  Description: Patient's Long Term Goal: comfort    Interventions:  - Follow POC  - meds as ordered  - See additional Care Plan goals for specific interventions  Outcome: Progressing  Goal: Patient/Family Short Term Goal  Description: Patient's Short Term Goal: improved agitation    Interventions:   - follow POC  - meds as ordered  - PRN meds as needed  - See additional Care Plan goals for specific interventions  Outcome: Progressing

## 2025-07-19 NOTE — HOSPICE RN NOTE
Hospice RN Second Rounds: POC to address pain, dyspnea and agitation reviewed with DIVINA Santos at bedside. Dilaudid gtt at 2mg/hr for pain/dyspnea and increased during shift up from 1.6mg/hr. PRN IVP Ativan administered once for agitation. Body posturing stiff, fists clenched, irregular, labored abdominal breathing noted.     Jeanne Yeboah BSN, RN, CHPN  Transitional Nurse Liaison  [facility]  [phone] (After-hours)

## 2025-07-19 NOTE — PLAN OF CARE
Problem: Risk for Violence/Aggression  Goal: Absence of Violence/Aggression  Description: INTERVENTIONS:   - Identify precipitating factors for behavior   - Notify Charge RN/Provider   - Consider decreasing stimulation   - Consider distraction measures   - Consider discussion with provider regarding prn meds    - Consider URMILA (Moderate Risk only)   - Consider Code Support (High Risk only)   - Consider room safety checks   - Consider restraints  Outcome: Progressing     Problem: PAIN - ADULT  Goal: Verbalizes/displays adequate comfort level or patient's stated pain goal  Description: INTERVENTIONS:  - Encourage pt to monitor pain and request assistance  - Assess pain using appropriate pain scale  - Administer analgesics based on type and severity of pain and evaluate response  - Implement non-pharmacological measures as appropriate and evaluate response  - Consider cultural and social influences on pain and pain management  - Manage/alleviate anxiety  - Utilize distraction and/or relaxation techniques  - Monitor for opioid side effects  - Notify MD/LIP if interventions unsuccessful or patient reports new pain  - Anticipate increased pain with activity and pre-medicate as appropriate  Outcome: Progressing     Problem: SAFETY ADULT - FALL  Goal: Free from fall injury  Description: INTERVENTIONS:  - Assess pt frequently for physical needs  - Identify cognitive and physical deficits and behaviors that affect risk of falls.  - [place] fall precautions as indicated by assessment.  - Educate pt/family on patient safety including physical limitations  - Instruct pt to call for assistance with activity based on assessment  - Modify environment to reduce risk of injury  - Provide assistive devices as appropriate  - Consider OT/PT consult to assist with strengthening/mobility  - Encourage toileting schedule  Outcome: Progressing     Problem: SKIN/TISSUE INTEGRITY - ADULT  Goal: Skin integrity remains intact  Description:  INTERVENTIONS  - Assess and document risk factors for pressure ulcer development  - Assess and document skin integrity  - Monitor for areas of redness and/or skin breakdown  - Initiate interventions, skin care algorithm/standards of care as needed  Outcome: Progressing     Problem: Impaired Swallowing  Goal: Minimize aspiration risk  Description: Interventions:  - Patient should be alert and upright for all feedings (90 degrees preferred)  - Offer food and liquids at a slow rate  - No straws  - Encourage small bites of food and small sips of liquid  - Offer pills one at a time, crush or deliver with applesauce as needed  - Discontinue feeding and notify MD (or speech pathologist) if coughing or persistent throat clearing or wet/gurgly vocal quality is noted  Outcome: Progressing     Problem: Patient/Family Goals  Goal: Patient/Family Long Term Goal  Description: Patient's Long Term Goal: comfort    Interventions:  - Follow POC  - meds as ordered  - See additional Care Plan goals for specific interventions  Outcome: Progressing  Goal: Patient/Family Short Term Goal  Description: Patient's Short Term Goal: improved agitation    Interventions:   - follow POC  - meds as ordered  - PRN meds as needed  - See additional Care Plan goals for specific interventions  Outcome: Progressing

## 2025-07-19 NOTE — PROGRESS NOTES
[facility] Hospitalist Progress Note     CC: Hospital Follow up    PCP: Jimbo Fowler MD       Assessment/Plan:     Active Problems:    Cerebral atherosclerosis    Mr. Shoemaker is a 89 year old male with PMH advanced dementia with history of behavioral disorders and psychosis, hypotension recently presented for profound dehydration and sepsis requiring pressors and significant acute kidney injury and IV fluids ultimately also developed hypoxia and sepsis secondary to urinary tract infection and chronic urinary retention patient was treated medically but prognosis remain guarded ultimately patient was admitted to inpatient hospice.  Comfort care ongoing.      Acute inpatient hospice secondary to advanced dementia and moderate protein calorie malnutrition with behavioral disturbances  -Comfort care ongoing  -Disposition planning per hospice team  -Patient is on Dilaudid  drip  -appears comfortable    DNR comfort       FN:  - IVF: none  - Diet: General Diet    Lines: Peripheral IV  Dispo: inpatient hospice,        Zhen Simpson MD  Hospitalist with [facility]     Subjective:     Unresponsive. Appears comfortable     OBJECTIVE:    Blood pressure (!) 69/53, pulse 62, temperature 97.3 °F (36.3 °C), temperature source Axillary, resp. rate 14, SpO2 (!) 82%.    Temp:  [96.6 °F (35.9 °C)-98.6 °F (37 °C)] 97.3 °F (36.3 °C)  Pulse:  [62] 62  Resp:  [14] 14  BP: (69-72)/(50-53) 69/53  SpO2:  [82 %] 82 %      Intake/Output:    Intake/Output Summary (Last 24 hours) at 7/19/2025 0922  Last data filed at 7/19/2025 0735  Gross per 24 hour   Intake 223.75 ml   Output --   Net 223.75 ml       Last 3 Weights   07/02/25 0611 131 lb 9.6 oz (59.7 kg)   07/01/25 0600 154 lb 9.6 oz (70.1 kg)   06/30/25 0900 150 lb 3.2 oz (68.1 kg)   06/29/25 1800 145 lb (65.8 kg)   08/02/23 1319 111 lb 8 oz (50.6 kg)   07/20/23 0019 117 lb 4.8 oz (53.2 kg)   07/19/23 2345 117 lb 4.8 oz (53.2 kg)   07/18/23 1039 126 lb 6.4 oz (57.3 kg)    07/03/23 1958 141 lb 8.6 oz (64.2 kg)   07/03/23 1357 145 lb (65.8 kg)       Exam   Gen: Lethargic laying in bed, unresponsive  Pulm: Lungs clear.           Data Review:       Labs:     No results for input(s): \"RBC\", \"HGB\", \"HCT\", \"MCV\", \"MCH\", \"MCHC\", \"RDW\", \"NEPRELIM\", \"WBC\", \"PLT\" in the last 168 hours.        No results for input(s): \"GLU\", \"BUN\", \"CREATSERUM\", \"GFRAA\", \"GFRNAA\", \"EGFRCR\", \"CA\", \"NA\", \"K\", \"CL\", \"CO2\" in the last 168 hours.      No results for input(s): \"ALT\", \"AST\", \"ALB\", \"AMYLASE\", \"LIPASE\", \"LDH\" in the last 168 hours.    Invalid input(s): \"ALPHOS\", \"TBIL\", \"DBIL\", \"TPROT\"      Imaging:  No results found.      Meds:     Scheduled Medications[1]  Medication Infusions[2]  PRN Medications[3]                                 [1]    scopolamine  1 patch Transdermal Q72H   [2]    HYDROmorphone in sodium chloride 0.9% 2 mg/hr (07/19/25 0309)   [3]   HYDROmorphone in sodium chloride 0.9%    atropine    glycerin-hypromellose-    haloperidol lactate **OR** haloperidol lactate    [DISCONTINUED] LORazepam **OR** LORazepam **OR** LORazepam    LORazepam Intensol    HYDROmorphone **OR** HYDROmorphone **OR** HYDROmorphone    diazepam    bisacodyl    acetaminophen    OLANZapine    sodium chloride 0.9%    glycopyrrolate    ondansetron **OR** ondansetron

## 2025-07-20 NOTE — HOSPICE RN NOTE
[facility] Inpatient Nursing Rounds:     Assessed patient at bedside, family not present, will reach out via telephone. Patient pursed lips during oral care, eye brows furrowed and eyes twitched when eyelids were being washed with warm water, minimally responsive to tactile stimulus. Scopolamine patch in place. RR 10, noted to inhale twice in a row quickly then period of apnea, shallow and labored on room air. Noted to tense neck muscles on one occasion, ongoing abdominal breathing. Generalized pallor, mottling noted to BLE. Edema to LUE. LBM: 7/20. Bowel sounds hypoactive in all quadrants. Bed confined. Appears imminent.   PPS: 10 %    Reviewed symptom management over the past 24 hours: Scopolamine patch in place for airway secretions. Dilaudid infusion increased to 2.4mg/hour incrementally over shift for pain/dyspnea. PRN Medications given include: IV Ativan 2mg x2 for agitation.     Patient receiving general inpatient hospice care for symptom management of pain, dyspnea and agitation requiring frequent nursing assessments and interventions including the administration and titration of IV Dilaudid per Dr. Zhen Simpson and Dr. Sonny Jacome. Potential discharge to LTC on hospice pending baseline dyspnea/pain/agitation intervention. Possible CADD pump at discharge. End of Life education provided along with discussion with family regarding daily assessment of GIP eligibility, to ensure treatment of symptoms and management of care is done at an appropriate level of care. POC discussed with Sandeep awad and Stefany Lindsey RN. Both in agreement. [facility] will continue to follow this patient closely for end of life while supporting patient and family.    Jeanne Yeboah BSN, RN, CHPN  Transitional Nurse Liaison  [facility]  [phone] (After-hours)

## 2025-07-20 NOTE — PLAN OF CARE
Problem: COPING  Goal: Pt/Family able to verbalize concerns and demonstrate effective coping strategies  Description: INTERVENTIONS:  - Assist patient/family to identify coping skills, available support systems and cultural and spiritual values  - Provide emotional support, including active listening and acknowledgement of concerns of patient and caregivers  - Reduce environmental stimuli, as able  - Instruct patient/family in relaxation techniques, as appropriate  - Assess for spiritual and psychosocial needs and initiate Spiritual Care or Behavioral Health consult as needed  Outcome: Progressing     Problem: DEATH & DYING  Goal: Pt/Family communicate acceptance of impending death and feel psychological comfort and peace  Description: INTERVENTIONS:  - Assess patient/family anxiety and grief process related to end of life issues  - Provide emotional and spiritual support  - Provide information about the patient’s health status with consideration of family and cultural values  - Communicate willingness to discuss death and facilitate grief process  with patient/family as appropriate  - Emphasize sustaining relationships within family system and community, or g[place]me[place]/spiritual traditions  - Initiate Spiritual Care consult as needed  Outcome: Progressing     Hamzah is unresponsive. Dil gtt continued. Junior draining to gravity. Irregular, apneic breathing. Repositioned as tolerated. No signs of pain, distress noted.

## 2025-07-20 NOTE — PLAN OF CARE
Patient unresponsive, dilaudid gtt 2.4mg/hr, ativan 1x, repositioned, oral care, [place] care, frequent rounding.    Problem: Risk for Violence/Aggression  Goal: Absence of Violence/Aggression  Description: INTERVENTIONS:   - Identify precipitating factors for behavior   - Notify Charge RN/Provider   - Consider decreasing stimulation   - Consider distraction measures   - Consider discussion with provider regarding prn meds    - Consider URMILA (Moderate Risk only)   - Consider Code Support (High Risk only)   - Consider room safety checks   - Consider restraints  Outcome: Progressing     Problem: PAIN - ADULT  Goal: Verbalizes/displays adequate comfort level or patient's stated pain goal  Description: INTERVENTIONS:  - Encourage pt to monitor pain and request assistance  - Assess pain using appropriate pain scale  - Administer analgesics based on type and severity of pain and evaluate response  - Implement non-pharmacological measures as appropriate and evaluate response  - Consider cultural and social influences on pain and pain management  - Manage/alleviate anxiety  - Utilize distraction and/or relaxation techniques  - Monitor for opioid side effects  - Notify MD/LIP if interventions unsuccessful or patient reports new pain  - Anticipate increased pain with activity and pre-medicate as appropriate  Outcome: Progressing     Problem: SAFETY ADULT - FALL  Goal: Free from fall injury  Description: INTERVENTIONS:  - Assess pt frequently for physical needs  - Identify cognitive and physical deficits and behaviors that affect risk of falls.  - [place] fall precautions as indicated by assessment.  - Educate pt/family on patient safety including physical limitations  - Instruct pt to call for assistance with activity based on assessment  - Modify environment to reduce risk of injury  - Provide assistive devices as appropriate  - Consider OT/PT consult to assist with strengthening/mobility  - Encourage toileting  schedule  Outcome: Progressing     Problem: SKIN/TISSUE INTEGRITY - ADULT  Goal: Skin integrity remains intact  Description: INTERVENTIONS  - Assess and document risk factors for pressure ulcer development  - Assess and document skin integrity  - Monitor for areas of redness and/or skin breakdown  - Initiate interventions, skin care algorithm/standards of care as needed  Outcome: Progressing     Problem: Impaired Swallowing  Goal: Minimize aspiration risk  Description: Interventions:  - Patient should be alert and upright for all feedings (90 degrees preferred)  - Offer food and liquids at a slow rate  - No straws  - Encourage small bites of food and small sips of liquid  - Offer pills one at a time, crush or deliver with applesauce as needed  - Discontinue feeding and notify MD (or speech pathologist) if coughing or persistent throat clearing or wet/gurgly vocal quality is noted  Outcome: Progressing     Problem: Patient/Family Goals  Goal: Patient/Family Long Term Goal  Description: Patient's Long Term Goal: comfort    Interventions:  - Follow POC  - meds as ordered  - See additional Care Plan goals for specific interventions  Outcome: Progressing  Goal: Patient/Family Short Term Goal  Description: Patient's Short Term Goal: improved agitation    Interventions:   - follow POC  - meds as ordered  - PRN meds as needed  - See additional Care Plan goals for specific interventions  Outcome: Progressing     Problem: COPING  Goal: Pt/Family able to verbalize concerns and demonstrate effective coping strategies  Description: INTERVENTIONS:  - Assist patient/family to identify coping skills, available support systems and cultural and spiritual values  - Provide emotional support, including active listening and acknowledgement of concerns of patient and caregivers  - Reduce environmental stimuli, as able  - Instruct patient/family in relaxation techniques, as appropriate  - Assess for spiritual and psychosocial needs and  initiate Spiritual Care or Behavioral Health consult as needed  Outcome: Progressing     Problem: DEATH & DYING  Goal: Pt/Family communicate acceptance of impending death and feel psychological comfort and peace  Description: INTERVENTIONS:  - Assess patient/family anxiety and grief process related to end of life issues  - Provide emotional and spiritual support  - Provide information about the patient’s health status with consideration of family and cultural values  - Communicate willingness to discuss death and facilitate grief process  with patient/family as appropriate  - Emphasize sustaining relationships within family system and community, or g[place]me[place]/spiritual traditions  - Initiate Spiritual Care consult as needed  Outcome: Progressing

## 2025-07-20 NOTE — PROGRESS NOTES
[facility] Hospitalist Progress Note     CC: Hospital Follow up    PCP: Jimbo Fowler MD       Assessment/Plan:     Active Problems:    Cerebral atherosclerosis    Mr. Shoemaker is a 89 year old male with PMH advanced dementia with history of behavioral disorders and psychosis, hypotension recently presented for profound dehydration and sepsis requiring pressors and significant acute kidney injury and IV fluids ultimately also developed hypoxia and sepsis secondary to urinary tract infection and chronic urinary retention patient was treated medically but prognosis remain guarded ultimately patient was admitted to inpatient hospice.  Comfort care ongoing.      Acute inpatient hospice secondary to advanced dementia and moderate protein calorie malnutrition with behavioral disturbances  -Comfort care ongoing  -Disposition planning per hospice team  -Patient is on Dilaudid  drip  -appears to be breathing faster, RR 18, will increase drip, yesterday RR 8    DNR comfort       FN:  - IVF: none  - Diet: General Diet    Lines: Peripheral IV  Dispo: inpatient hospice,        Zhen Simpson MD  Hospitalist with [facility]     Subjective:     Unresponsive. Appears to be breathing faster    OBJECTIVE:    Blood pressure 95/57, pulse 69, temperature (!) 95.5 °F (35.3 °C), temperature source Axillary, resp. rate 12, SpO2 90%.    Temp:  [94.5 °F (34.7 °C)-97.9 °F (36.6 °C)] 95.5 °F (35.3 °C)  Pulse:  [42-69] 69  Resp:  [12] 12  BP: ()/() 95/57  SpO2:  [75 %-90 %] 90 %      Intake/Output:    Intake/Output Summary (Last 24 hours) at 7/20/2025 1118  Last data filed at 7/20/2025 0711  Gross per 24 hour   Intake 235.8 ml   Output --   Net 235.8 ml       Last 3 Weights   07/02/25 0611 131 lb 9.6 oz (59.7 kg)   07/01/25 0600 154 lb 9.6 oz (70.1 kg)   06/30/25 0900 150 lb 3.2 oz (68.1 kg)   06/29/25 1800 145 lb (65.8 kg)   08/02/23 1319 111 lb 8 oz (50.6 kg)   07/20/23 0019 117 lb 4.8 oz (53.2 kg)   07/19/23 7857  117 lb 4.8 oz (53.2 kg)   07/18/23 1039 126 lb 6.4 oz (57.3 kg)   07/03/23 1958 141 lb 8.6 oz (64.2 kg)   07/03/23 1357 145 lb (65.8 kg)       Exam   Gen: Lethargic laying in bed, unresponsive  Pulm: Lungs clear.  Breathing faster         Data Review:       Labs:     No results for input(s): \"RBC\", \"HGB\", \"HCT\", \"MCV\", \"MCH\", \"MCHC\", \"RDW\", \"NEPRELIM\", \"WBC\", \"PLT\" in the last 168 hours.        No results for input(s): \"GLU\", \"BUN\", \"CREATSERUM\", \"GFRAA\", \"GFRNAA\", \"EGFRCR\", \"CA\", \"NA\", \"K\", \"CL\", \"CO2\" in the last 168 hours.      No results for input(s): \"ALT\", \"AST\", \"ALB\", \"AMYLASE\", \"LIPASE\", \"LDH\" in the last 168 hours.    Invalid input(s): \"ALPHOS\", \"TBIL\", \"DBIL\", \"TPROT\"      Imaging:  No results found.      Meds:     Scheduled Medications[1]  Medication Infusions[2]  PRN Medications[3]                                   [1]    scopolamine  1 patch Transdermal Q72H   [2]    HYDROmorphone in sodium chloride 0.9% 2.2 mg/hr (07/20/25 1049)   [3]   HYDROmorphone in sodium chloride 0.9%    atropine    glycerin-hypromellose-    haloperidol lactate **OR** haloperidol lactate    [DISCONTINUED] LORazepam **OR** LORazepam **OR** LORazepam    LORazepam Intensol    HYDROmorphone **OR** HYDROmorphone **OR** HYDROmorphone    diazepam    bisacodyl    acetaminophen    OLANZapine    sodium chloride 0.9%    glycopyrrolate    ondansetron **OR** ondansetron

## 2025-07-21 NOTE — PROGRESS NOTES
[facility] Hospitalist Progress Note     CC: Hospital Follow up    PCP: Jimbo Fowler MD       Assessment/Plan:     Active Problems:    Cerebral atherosclerosis    Mr. Shoemaker is a 89 year old male with PMH advanced dementia with history of behavioral disorders and psychosis, hypotension recently presented for profound dehydration and sepsis requiring pressors and significant acute kidney injury and IV fluids ultimately also developed hypoxia and sepsis secondary to urinary tract infection and chronic urinary retention patient was treated medically but prognosis remain guarded ultimately patient was admitted to inpatient hospice.  Comfort care ongoing.      Acute inpatient hospice secondary to advanced dementia and moderate protein calorie malnutrition with behavioral disturbances  -Comfort care ongoing  -Disposition planning per hospice team  -Patient is on Dilaudid  drip  -resp rate improved today    DNR comfort       FN:  - IVF: none  - Diet: General Diet    Lines: Peripheral IV  Dispo: inpatient hospice,        Zhen Simpson MD  Hospitalist with [facility]     Subjective:     Unresponsive. Appears comfortable    OBJECTIVE:    Blood pressure 108/63, pulse 70, temperature 96.8 °F (36 °C), temperature source Axillary, resp. rate 12, SpO2 90%.    Temp:  [96.8 °F (36 °C)] 96.8 °F (36 °C)  Pulse:  [70] 70  Resp:  [6-12] 12  BP: ()/(63-66) 108/63      Intake/Output:    Intake/Output Summary (Last 24 hours) at 7/21/2025 1213  Last data filed at 7/21/2025 0726  Gross per 24 hour   Intake 269.65 ml   Output 1500 ml   Net -1230.35 ml       Last 3 Weights   07/02/25 0611 131 lb 9.6 oz (59.7 kg)   07/01/25 0600 154 lb 9.6 oz (70.1 kg)   06/30/25 0900 150 lb 3.2 oz (68.1 kg)   06/29/25 1800 145 lb (65.8 kg)   08/02/23 1319 111 lb 8 oz (50.6 kg)   07/20/23 0019 117 lb 4.8 oz (53.2 kg)   07/19/23 2345 117 lb 4.8 oz (53.2 kg)   07/18/23 1039 126 lb 6.4 oz (57.3 kg)   07/03/23 1958 141 lb 8.6 oz (64.2 kg)    07/03/23 1357 145 lb (65.8 kg)       Exam   Gen: Lethargic laying in bed, unresponsive  Pulm: Lungs clear.           Data Review:       Labs:     No results for input(s): \"RBC\", \"HGB\", \"HCT\", \"MCV\", \"MCH\", \"MCHC\", \"RDW\", \"NEPRELIM\", \"WBC\", \"PLT\" in the last 168 hours.        No results for input(s): \"GLU\", \"BUN\", \"CREATSERUM\", \"GFRAA\", \"GFRNAA\", \"EGFRCR\", \"CA\", \"NA\", \"K\", \"CL\", \"CO2\" in the last 168 hours.      No results for input(s): \"ALT\", \"AST\", \"ALB\", \"AMYLASE\", \"LIPASE\", \"LDH\" in the last 168 hours.    Invalid input(s): \"ALPHOS\", \"TBIL\", \"DBIL\", \"TPROT\"      Imaging:  No results found.      Meds:     Scheduled Medications[1]  Medication Infusions[2]  PRN Medications[3]                                     [1]    scopolamine  1 patch Transdermal Q72H   [2]    HYDROmorphone in sodium chloride 0.9% 2.4 mg/hr (07/21/25 0928)   [3]   HYDROmorphone in sodium chloride 0.9%    atropine    glycerin-hypromellose-    haloperidol lactate **OR** haloperidol lactate    [DISCONTINUED] LORazepam **OR** LORazepam **OR** LORazepam    LORazepam Intensol    HYDROmorphone **OR** HYDROmorphone **OR** HYDROmorphone    diazepam    bisacodyl    acetaminophen    OLANZapine    sodium chloride 0.9%    glycopyrrolate    ondansetron **OR** ondansetron

## 2025-07-21 NOTE — HOSPICE RN NOTE
[facility] Inpatient Nursing Rounds:     Hospice RN and LSW visited patient at the bedside. No visitors present during the assessment. Patient remains minimally responsive. Very frail and cachectic with temporal wasting noted. Less tense posturing with physical assessment. Open mouth breathing on room air. RR 4-6 and very shallow with 30 seconds of apnea observed. Lungs sound very diminished throughout with little air movement noted. Skin is cool and pale with dusky/cyanotic nailbeds. Abdomen is flat, soft with hypoactive bowel sounds. Per Antoine MURCIA, patient had pinzon exchange due to clogged catheter. Obtained over 1500 ml of urine after new catheter placed. Pinzon draining small brown cloudy urine. LBM: 7/21/25.     Reviewed symptom management over the past 24 hours: Scopolamine patch remains in place behind left ear. Dilaudid drip remains at 2.4 mg/hr (last titration yest evening). Ativan 2 mg IVP x1 for agitation.      PPS: 10%    Patient receiving general inpatient hospice care for symptom management of pain, dyspnea, and agitation requiring frequent nursing assessments and interventions including titration of IV medications. Potential discharge plan with a return to [facility] for routine hospice pending clinical course and symptom management. POC discussed with Antoine MURCIA. Hospice will update patient's son, Sandeep, via phone. All in agreement. [facility] will continue to support the patient and family.    Stefany CASAS, RN CHPN  Transitional Nurse Liaison  [facility]  [phone] (After-hours)

## 2025-07-21 NOTE — PLAN OF CARE
Remains on dilaudid gtt - increased to 2.6mg/hr this afternoon for dyspnea. Unresponsive/obtunded. Appears comfortable. Junior to gravity. EOL care and comfort care measures in place and maintained.    Problem: Risk for Violence/Aggression  Goal: Absence of Violence/Aggression  Description: INTERVENTIONS:   - Identify precipitating factors for behavior   - Notify Charge RN/Provider   - Consider decreasing stimulation   - Consider distraction measures   - Consider discussion with provider regarding prn meds    - Consider URMILA (Moderate Risk only)   - Consider Code Support (High Risk only)   - Consider room safety checks   - Consider restraints  Outcome: Progressing     Problem: PAIN - ADULT  Goal: Verbalizes/displays adequate comfort level or patient's stated pain goal  Description: INTERVENTIONS:  - Encourage pt to monitor pain and request assistance  - Assess pain using appropriate pain scale  - Administer analgesics based on type and severity of pain and evaluate response  - Implement non-pharmacological measures as appropriate and evaluate response  - Consider cultural and social influences on pain and pain management  - Manage/alleviate anxiety  - Utilize distraction and/or relaxation techniques  - Monitor for opioid side effects  - Notify MD/LIP if interventions unsuccessful or patient reports new pain  - Anticipate increased pain with activity and pre-medicate as appropriate  Outcome: Progressing     Problem: SAFETY ADULT - FALL  Goal: Free from fall injury  Description: INTERVENTIONS:  - Assess pt frequently for physical needs  - Identify cognitive and physical deficits and behaviors that affect risk of falls.  - [place] fall precautions as indicated by assessment.  - Educate pt/family on patient safety including physical limitations  - Instruct pt to call for assistance with activity based on assessment  - Modify environment to reduce risk of injury  - Provide assistive devices as appropriate  - Consider  OT/PT consult to assist with strengthening/mobility  - Encourage toileting schedule  Outcome: Progressing     Problem: SKIN/TISSUE INTEGRITY - ADULT  Goal: Skin integrity remains intact  Description: INTERVENTIONS  - Assess and document risk factors for pressure ulcer development  - Assess and document skin integrity  - Monitor for areas of redness and/or skin breakdown  - Initiate interventions, skin care algorithm/standards of care as needed  Outcome: Progressing     Problem: Impaired Swallowing  Goal: Minimize aspiration risk  Description: Interventions:  - Patient should be alert and upright for all feedings (90 degrees preferred)  - Offer food and liquids at a slow rate  - No straws  - Encourage small bites of food and small sips of liquid  - Offer pills one at a time, crush or deliver with applesauce as needed  - Discontinue feeding and notify MD (or speech pathologist) if coughing or persistent throat clearing or wet/gurgly vocal quality is noted  Outcome: Progressing     Problem: Patient/Family Goals  Goal: Patient/Family Long Term Goal  Description: Patient's Long Term Goal: comfort    Interventions:  - Follow POC  - meds as ordered  - See additional Care Plan goals for specific interventions  Outcome: Progressing  Goal: Patient/Family Short Term Go  Outcome: Progressing     Problem: COPING  Goal: Pt/Family able to verbalize concerns and demonstrate effective coping strategies  Description: INTERVENTIONS:  - Assist patient/family to identify coping skills, available support systems and cultural and spiritual values  - Provide emotional support, including active listening and acknowledgement of concerns of patient and caregivers  - Reduce environmental stimuli, as able  - Instruct patient/family in relaxation techniques, as appropriate  - Assess for spiritual and psychosocial needs and initiate Spiritual Care or Behavioral Health consult as needed  Outcome: Progressing     Problem: DEATH & DYING  Goal:  Pt/Family communicate acceptance of impending death and feel psychological comfort and peace  Description: INTERVENTIONS:  - Assess patient/family anxiety and grief process related to end of life issues  - Provide emotional and spiritual support  - Provide information about the patient’s health status with consideration of family and cultural values  - Communicate willingness to discuss death and facilitate grief process  with patient/family as appropriate  - Emphasize sustaining relationships within family system and community, or gómez/spiritual traditions  - Initiate Spiritual Care consult as needed  Outcome: Progressing

## 2025-07-21 NOTE — PLAN OF CARE
Patient unresponsive. Continues with dilaudid gtt. PRN ativan given x1 this AM for restlessness after bath. Junior leaking, replaced overnight. Bowel movement overnight. Bathed and repositioned. Comfort focused care provided. Will continue to monitor.      Problem: Risk for Violence/Aggression  Goal: Absence of Violence/Aggression  Description: INTERVENTIONS:   - Identify precipitating factors for behavior   - Notify Charge RN/Provider   - Consider decreasing stimulation   - Consider distraction measures   - Consider discussion with provider regarding prn meds    - Consider URMILA (Moderate Risk only)   - Consider Code Support (High Risk only)   - Consider room safety checks   - Consider restraints  Outcome: Progressing     Problem: PAIN - ADULT  Goal: Verbalizes/displays adequate comfort level or patient's stated pain goal  Description: INTERVENTIONS:  - Encourage pt to monitor pain and request assistance  - Assess pain using appropriate pain scale  - Administer analgesics based on type and severity of pain and evaluate response  - Implement non-pharmacological measures as appropriate and evaluate response  - Consider cultural and social influences on pain and pain management  - Manage/alleviate anxiety  - Utilize distraction and/or relaxation techniques  - Monitor for opioid side effects  - Notify MD/LIP if interventions unsuccessful or patient reports new pain  - Anticipate increased pain with activity and pre-medicate as appropriate  Outcome: Progressing     Problem: SAFETY ADULT - FALL  Goal: Free from fall injury  Description: INTERVENTIONS:  - Assess pt frequently for physical needs  - Identify cognitive and physical deficits and behaviors that affect risk of falls.  - [place] fall precautions as indicated by assessment.  - Educate pt/family on patient safety including physical limitations  - Instruct pt to call for assistance with activity based on assessment  - Modify environment to reduce risk of injury  -  Provide assistive devices as appropriate  - Consider OT/PT consult to assist with strengthening/mobility  - Encourage toileting schedule  Outcome: Progressing     Problem: SKIN/TISSUE INTEGRITY - ADULT  Goal: Skin integrity remains intact  Description: INTERVENTIONS  - Assess and document risk factors for pressure ulcer development  - Assess and document skin integrity  - Monitor for areas of redness and/or skin breakdown  - Initiate interventions, skin care algorithm/standards of care as needed  Outcome: Progressing     Problem: Impaired Swallowing  Goal: Minimize aspiration risk  Description: Interventions:  - Patient should be alert and upright for all feedings (90 degrees preferred)  - Offer food and liquids at a slow rate  - No straws  - Encourage small bites of food and small sips of liquid  - Offer pills one at a time, crush or deliver with applesauce as needed  - Discontinue feeding and notify MD (or speech pathologist) if coughing or persistent throat clearing or wet/gurgly vocal quality is noted  Outcome: Progressing     Problem: Patient/Family Goals  Goal: Patient/Family Long Term Goal  Description: Patient's Long Term Goal: comfort    Interventions:  - Follow POC  - meds as ordered  - See additional Care Plan goals for specific interventions  Outcome: Progressing  Goal: Patient/Family Short Term Goal  Description: Patient's Short Term Goal: improved agitation    Interventions:   - follow POC  - meds as ordered  - PRN meds as needed  - See additional Care Plan goals for specific interventions  Outcome: Progressing     Problem: COPING  Goal: Pt/Family able to verbalize concerns and demonstrate effective coping strategies  Description: INTERVENTIONS:  - Assist patient/family to identify coping skills, available support systems and cultural and spiritual values  - Provide emotional support, including active listening and acknowledgement of concerns of patient and caregivers  - Reduce environmental stimuli,  as able  - Instruct patient/family in relaxation techniques, as appropriate  - Assess for spiritual and psychosocial needs and initiate Spiritual Care or Behavioral Health consult as needed  Outcome: Progressing     Problem: DEATH & DYING  Goal: Pt/Family communicate acceptance of impending death and feel psychological comfort and peace  Description: INTERVENTIONS:  - Assess patient/family anxiety and grief process related to end of life issues  - Provide emotional and spiritual support  - Provide information about the patient’s health status with consideration of family and cultural values  - Communicate willingness to discuss death and facilitate grief process  with patient/family as appropriate  - Emphasize sustaining relationships within family system and community, or gómez/spiritual traditions  - Initiate Spiritual Care consult as needed  Outcome: Progressing

## 2025-07-22 NOTE — HOSPICE RN NOTE
[facility] Inpatient Nursing Rounds:     Hospice RN and MSW visited patient and family at bedside. Patient unresponsive to verbal stimuli, withdraws from touch with facial grimacing. Furrowed brow and facial grimacing intermittent during physical assessment. Breathing labored, shallow, irregular, RR 12, diminished in all fields, on room air. Skin pale, moist. Mottling to bilateral feet and fingertips. Junior in place draining cloudy, dark yellow urine. LBM: 7/22/25. Bowel sounds hypoactive in all quadrants.     Reviewed symptom management over the past 24 hours: Scopolamine patch in place behind left ear for airway secretions. Hydromorphone infusion increased to 2.8 mg/hr overnight for increased pain and dyspnea. PRN medications given include: acetaminophen 1,000 mg IVP x1 for pain, lorazepam 2 mg IVP x2 for agitation.      PPS: 10%    Patient receiving general inpatient hospice care for symptom management of pain, dyspnea, and agitation requiring frequent nursing assessments and interventions including the titration and administration of IV medications per Dr. Simpson and Dr. Jacome. Discharge planning pending symptom management. Transfer to [facility] with CADD pump therapy on hold. POC discussed with the patient's son, Sandeep, and Antoine Sy RN at bedside. Both in agreement. [facility] will continue to support the patient and family.    1524: Hydromorphone infusion increased to 3 mg/hr for increased pain observed by staff RN, Antoine.    Paulo Luz, ISABELN, RN, [facility]  [facility] RN Liaison  [phone] (After-hours)

## 2025-07-22 NOTE — PLAN OF CARE
Patient unresponsive. Dilaudid gtt running at 2.8mg/hr. PRN ativan given x1. Junior in place. Oral care prn. +BM overnight. Comfort focused care provided. Will continue to monitor.       Problem: Risk for Violence/Aggression  Goal: Absence of Violence/Aggression  Description: INTERVENTIONS:   - Identify precipitating factors for behavior   - Notify Charge RN/Provider   - Consider decreasing stimulation   - Consider distraction measures   - Consider discussion with provider regarding prn meds    - Consider URMILA (Moderate Risk only)   - Consider Code Support (High Risk only)   - Consider room safety checks   - Consider restraints  Outcome: Progressing     Problem: PAIN - ADULT  Goal: Verbalizes/displays adequate comfort level or patient's stated pain goal  Description: INTERVENTIONS:  - Encourage pt to monitor pain and request assistance  - Assess pain using appropriate pain scale  - Administer analgesics based on type and severity of pain and evaluate response  - Implement non-pharmacological measures as appropriate and evaluate response  - Consider cultural and social influences on pain and pain management  - Manage/alleviate anxiety  - Utilize distraction and/or relaxation techniques  - Monitor for opioid side effects  - Notify MD/LIP if interventions unsuccessful or patient reports new pain  - Anticipate increased pain with activity and pre-medicate as appropriate  Outcome: Progressing     Problem: SAFETY ADULT - FALL  Goal: Free from fall injury  Description: INTERVENTIONS:  - Assess pt frequently for physical needs  - Identify cognitive and physical deficits and behaviors that affect risk of falls.  - [place] fall precautions as indicated by assessment.  - Educate pt/family on patient safety including physical limitations  - Instruct pt to call for assistance with activity based on assessment  - Modify environment to reduce risk of injury  - Provide assistive devices as appropriate  - Consider OT/PT consult to  assist with strengthening/mobility  - Encourage toileting schedule  Outcome: Progressing     Problem: SKIN/TISSUE INTEGRITY - ADULT  Goal: Skin integrity remains intact  Description: INTERVENTIONS  - Assess and document risk factors for pressure ulcer development  - Assess and document skin integrity  - Monitor for areas of redness and/or skin breakdown  - Initiate interventions, skin care algorithm/standards of care as needed  Outcome: Progressing     Problem: Impaired Swallowing  Goal: Minimize aspiration risk  Description: Interventions:  - Patient should be alert and upright for all feedings (90 degrees preferred)  - Offer food and liquids at a slow rate  - No straws  - Encourage small bites of food and small sips of liquid  - Offer pills one at a time, crush or deliver with applesauce as needed  - Discontinue feeding and notify MD (or speech pathologist) if coughing or persistent throat clearing or wet/gurgly vocal quality is noted  Outcome: Progressing     Problem: Patient/Family Goals  Goal: Patient/Family Long Term Goal  Description: Patient's Long Term Goal: comfort    Interventions:  - Follow POC  - meds as ordered  - See additional Care Plan goals for specific interventions  Outcome: Progressing  Goal: Patient/Family Short Term Goal  Description: Patient's Short Term Goal: improved agitation    Interventions:   - follow POC  - meds as ordered  - PRN meds as needed  - See additional Care Plan goals for specific interventions  Outcome: Progressing     Problem: COPING  Goal: Pt/Family able to verbalize concerns and demonstrate effective coping strategies  Description: INTERVENTIONS:  - Assist patient/family to identify coping skills, available support systems and cultural and spiritual values  - Provide emotional support, including active listening and acknowledgement of concerns of patient and caregivers  - Reduce environmental stimuli, as able  - Instruct patient/family in relaxation techniques, as  appropriate  - Assess for spiritual and psychosocial needs and initiate Spiritual Care or Behavioral Health consult as needed  Outcome: Progressing     Problem: DEATH & DYING  Goal: Pt/Family communicate acceptance of impending death and feel psychological comfort and peace  Description: INTERVENTIONS:  - Assess patient/family anxiety and grief process related to end of life issues  - Provide emotional and spiritual support  - Provide information about the patient’s health status with consideration of family and cultural values  - Communicate willingness to discuss death and facilitate grief process  with patient/family as appropriate  - Emphasize sustaining relationships within family system and community, or gómez/spiritual traditions  - Initiate Spiritual Care consult as needed  Outcome: Progressing

## 2025-07-22 NOTE — PROGRESS NOTES
[facility] Hospitalist Progress Note     CC: Hospital Follow up    PCP: Jimbo Fowler MD       Assessment/Plan:     Active Problems:    Cerebral atherosclerosis    Mr. Shoemaker is a 89 year old male with PMH advanced dementia with history of behavioral disorders and psychosis, hypotension recently presented for profound dehydration and sepsis requiring pressors and significant acute kidney injury and IV fluids ultimately also developed hypoxia and sepsis secondary to urinary tract infection and chronic urinary retention patient was treated medically but prognosis remain guarded ultimately patient was admitted to inpatient hospice.  Comfort care ongoing.      Acute inpatient hospice secondary to advanced dementia and moderate protein calorie malnutrition with behavioral disturbances  -Comfort care ongoing  -Disposition planning per hospice team  -Patient is on Dilaudid  drip  -resp rate stable    DNR comfort       FN:  - IVF: none  - Diet: General Diet    Lines: Peripheral IV  Dispo: inpatient hospice,     Discussed with family son and daughter at bedside       Zhen Simpson MD  Hospitalist with [facility]     Subjective:     Unresponsive. Appears comfortable    OBJECTIVE:    Blood pressure 100/50, pulse 58, temperature 96.8 °F (36 °C), temperature source Axillary, resp. rate (!) 8, SpO2 90%.    Pulse:  [58-70] 58  Resp:  [6-12] 8  BP: (100-108)/(50-63) 100/50      Intake/Output:    Intake/Output Summary (Last 24 hours) at 7/22/2025 1014  Last data filed at 7/22/2025 0712  Gross per 24 hour   Intake 298.7 ml   Output 950 ml   Net -651.3 ml       Last 3 Weights   07/02/25 0611 131 lb 9.6 oz (59.7 kg)   07/01/25 0600 154 lb 9.6 oz (70.1 kg)   06/30/25 0900 150 lb 3.2 oz (68.1 kg)   06/29/25 1800 145 lb (65.8 kg)   08/02/23 1319 111 lb 8 oz (50.6 kg)   07/20/23 0019 117 lb 4.8 oz (53.2 kg)   07/19/23 2345 117 lb 4.8 oz (53.2 kg)   07/18/23 1039 126 lb 6.4 oz (57.3 kg)   07/03/23 1958 141 lb 8.6 oz (64.2  kg)   07/03/23 1357 145 lb (65.8 kg)       Exam   Gen: Lethargic laying in bed, unresponsive  Pulm: Lungs clear.           Data Review:       Labs:     No results for input(s): \"RBC\", \"HGB\", \"HCT\", \"MCV\", \"MCH\", \"MCHC\", \"RDW\", \"NEPRELIM\", \"WBC\", \"PLT\" in the last 168 hours.        No results for input(s): \"GLU\", \"BUN\", \"CREATSERUM\", \"GFRAA\", \"GFRNAA\", \"EGFRCR\", \"CA\", \"NA\", \"K\", \"CL\", \"CO2\" in the last 168 hours.      No results for input(s): \"ALT\", \"AST\", \"ALB\", \"AMYLASE\", \"LIPASE\", \"LDH\" in the last 168 hours.    Invalid input(s): \"ALPHOS\", \"TBIL\", \"DBIL\", \"TPROT\"      Imaging:  No results found.      Meds:     Scheduled Medications[1]  Medication Infusions[2]  PRN Medications[3]                                       [1]    scopolamine  1 patch Transdermal Q72H   [2]    HYDROmorphone in sodium chloride 0.9% 2.8 mg/hr (07/22/25 0713)   [3]   HYDROmorphone in sodium chloride 0.9%    atropine    glycerin-hypromellose-    haloperidol lactate **OR** haloperidol lactate    [DISCONTINUED] LORazepam **OR** LORazepam **OR** LORazepam    LORazepam Intensol    HYDROmorphone **OR** HYDROmorphone **OR** HYDROmorphone    diazepam    bisacodyl    acetaminophen    OLANZapine    sodium chloride 0.9%    glycopyrrolate    ondansetron **OR** ondansetron

## 2025-07-22 NOTE — PLAN OF CARE
Remains on dilaudid gtt - increased to 3mg/hr for dyspnea and appearing uncomfortable. Remains Unresponsive/obtunded. POA/Son at bedside earlier this morning. Junior remains to gravity. Comfort care and EOL measures in place and maintained.     Problem: Risk for Violence/Aggression  Goal: Absence of Violence/Aggression  Description: INTERVENTIONS:   - Identify precipitating factors for behavior   - Notify Charge RN/Provider   - Consider decreasing stimulation   - Consider distraction measures   - Consider discussion with provider regarding prn meds    - Consider URMILA (Moderate Risk only)   - Consider Code Support (High Risk only)   - Consider room safety checks   - Consider restraints  Outcome: Progressing     Problem: PAIN - ADULT  Goal: Verbalizes/displays adequate comfort level or patient's stated pain goal  Description: INTERVENTIONS:  - Encourage pt to monitor pain and request assistance  - Assess pain using appropriate pain scale  - Administer analgesics based on type and severity of pain and evaluate response  - Implement non-pharmacological measures as appropriate and evaluate response  - Consider cultural and social influences on pain and pain management  - Manage/alleviate anxiety  - Utilize distraction and/or relaxation techniques  - Monitor for opioid side effects  - Notify MD/LIP if interventions unsuccessful or patient reports new pain  - Anticipate increased pain with activity and pre-medicate as appropriate  Outcome: Progressing     Problem: SAFETY ADULT - FALL  Goal: Free from fall injury  Description: INTERVENTIONS:  - Assess pt frequently for physical needs  - Identify cognitive and physical deficits and behaviors that affect risk of falls.  - [place] fall precautions as indicated by assessment.  - Educate pt/family on patient safety including physical limitations  - Instruct pt to call for assistance with activity based on assessment  - Modify environment to reduce risk of injury  - Provide  assistive devices as appropriate  - Consider OT/PT consult to assist with strengthening/mobility  - Encourage toileting schedule  Outcome: Progressing     Problem: SKIN/TISSUE INTEGRITY - ADULT  Goal: Skin integrity remains intact  Description: INTERVENTIONS  - Assess and document risk factors for pressure ulcer development  - Assess and document skin integrity  - Monitor for areas of redness and/or skin breakdown  - Initiate interventions, skin care algorithm/standards of care as needed  Outcome: Progressing     Problem: Impaired Swallowing  Goal: Minimize aspiration risk  Description: Interventions:  - Patient should be alert and upright for all feedings (90 degrees preferred)  - Offer food and liquids at a slow rate  - No straws  - Encourage small bites of food and small sips of liquid  - Offer pills one at a time, crush or deliver with applesauce as needed  - Discontinue feeding and notify MD (or speech pathologist) if coughing or persistent throat clearing or wet/gurgly vocal quality is noted  Outcome: Progressing     Problem: Patient/Family Goals  Goal: Patient/Family Long Term Goal  Description: Patient's Long Term Goal: comfort    Interventions:  - Follow POC  - meds as ordered  - See additional Care Plan goals for specific interventions  Outcome: Progressing  Goal: Patient/Family Short Term Goal  Description: Patient's Short Term Goal: improved agitation    Interventions:   - follow POC  - meds as ordered  - PRN meds as needed  - See additional Care Plan goals for specific interventions  Outcome: Progressing     Problem: COPING  Goal: Pt/Family able to verbalize concerns and demonstrate effective coping strategies  Description: INTERVENTIONS:  - Assist patient/family to identify coping skills, available support systems and cultural and spiritual values  - Provide emotional support, including active listening and acknowledgement of concerns of patient and caregivers  - Reduce environmental stimuli, as  able  - Instruct patient/family in relaxation techniques, as appropriate  - Assess for spiritual and psychosocial needs and initiate Spiritual Care or Behavioral Health consult as needed  Outcome: Progressing     Problem: DEATH & DYING  Goal: Pt/Family communicate acceptance of impending death and feel psychological comfort and peace  Description: INTERVENTIONS:  - Assess patient/family anxiety and grief process related to end of life issues  - Provide emotional and spiritual support  - Provide information about the patient’s health status with consideration of family and cultural values  - Communicate willingness to discuss death and facilitate grief process  with patient/family as appropriate  - Emphasize sustaining relationships within family system and community, or gómez/spiritual traditions  - Initiate Spiritual Care consult as needed  Outcome: Progressing

## 2025-07-22 NOTE — PLAN OF CARE
During BP vitals check, pt grimacing face, sighing out loud, and moving hands - when trying to place blood pressure cuff on arm. Dilaudid gtt increased to 3mg/hr and 2mg IVP ativan given for comfort and the above. Also appears clammy and warm - IV APAP given.

## 2025-07-23 NOTE — HOSPICE RN NOTE
[facility] Inpatient Nursing Rounds:     Hospice RN, MSW, and Liaison visited patient. No family present. Patient unresponsive to verbal stimuli, withdraws from touch with facial grimace and twitching. Furrowed brow and facial grimacing during physical assessment. Breathing labored, shallow, irregular, RR 14. Breath sounds diminished in all fields, on room air. Skin pale, cool. Mottling noted to bilateral feet and fingertips. Junior remains in place draining cloudy, dark yellow urine. LBM: 7/23/25. Bowel sounds hypoactive in all quadrants.     Reviewed symptom management over the past 24 hours: Scopolamine patch in place behind left ear for airway secretions. Hydromorphone infusion increased to 3 mg/hr last night for increased pain and dyspnea. PRN medications given include: lorazepam 2 mg IVP x2 for agitation, Dilaudid 0.4mg IVP x1 for pain and dyspnea, and Valium 2.5mg IVP for agitation.      PPS: 10%    Patient receiving general inpatient hospice care for symptom management of pain, dyspnea, agitation, and excessive secretions requiring frequent nursing assessments and interventions including the titration and administration of IV medications per Dr. Simpson and Dr. Jacome. Discharge planning pending symptom management. Transfer to [facility] with CADD pump therapy on hold. POC discussed with Antoine Sy RN, in agreement. Will update family via telephone. [facility] will continue to support the patient and family.    James Wesley RN BSN  [facility]  Transitional Nurse Liaison  [phone] / [phone] (after hours)

## 2025-07-23 NOTE — PLAN OF CARE
Patient unresponsive. Dilaudid gtt infusing. PRN ativan & valium given. Junior in place. Comfort measures maintained    Problem: Risk for Violence/Aggression  Goal: Absence of Violence/Aggression  Description: INTERVENTIONS:   - Identify precipitating factors for behavior   - Notify Charge RN/Provider   - Consider decreasing stimulation   - Consider distraction measures   - Consider discussion with provider regarding prn meds    - Consider URMILA (Moderate Risk only)   - Consider Code Support (High Risk only)   - Consider room safety checks   - Consider restraints  Outcome: Progressing     Problem: PAIN - ADULT  Goal: Verbalizes/displays adequate comfort level or patient's stated pain goal  Description: INTERVENTIONS:  - Encourage pt to monitor pain and request assistance  - Assess pain using appropriate pain scale  - Administer analgesics based on type and severity of pain and evaluate response  - Implement non-pharmacological measures as appropriate and evaluate response  - Consider cultural and social influences on pain and pain management  - Manage/alleviate anxiety  - Utilize distraction and/or relaxation techniques  - Monitor for opioid side effects  - Notify MD/LIP if interventions unsuccessful or patient reports new pain  - Anticipate increased pain with activity and pre-medicate as appropriate  Outcome: Progressing     Problem: Risk for Violence/Aggression  Goal: Absence of Violence/Aggression  Description: INTERVENTIONS:   - Identify precipitating factors for behavior   - Notify Charge RN/Provider   - Consider decreasing stimulation   - Consider distraction measures   - Consider discussion with provider regarding prn meds    - Consider URMILA (Moderate Risk only)   - Consider Code Support (High Risk only)   - Consider room safety checks   - Consider restraints  Outcome: Progressing     Problem: PAIN - ADULT  Goal: Verbalizes/displays adequate comfort level or patient's stated pain goal  Description:  INTERVENTIONS:  - Encourage pt to monitor pain and request assistance  - Assess pain using appropriate pain scale  - Administer analgesics based on type and severity of pain and evaluate response  - Implement non-pharmacological measures as appropriate and evaluate response  - Consider cultural and social influences on pain and pain management  - Manage/alleviate anxiety  - Utilize distraction and/or relaxation techniques  - Monitor for opioid side effects  - Notify MD/LIP if interventions unsuccessful or patient reports new pain  - Anticipate increased pain with activity and pre-medicate as appropriate  Outcome: Progressing     Problem: SAFETY ADULT - FALL  Goal: Free from fall injury  Description: INTERVENTIONS:  - Assess pt frequently for physical needs  - Identify cognitive and physical deficits and behaviors that affect risk of falls.  - [place] fall precautions as indicated by assessment.  - Educate pt/family on patient safety including physical limitations  - Instruct pt to call for assistance with activity based on assessment  - Modify environment to reduce risk of injury  - Provide assistive devices as appropriate  - Consider OT/PT consult to assist with strengthening/mobility  - Encourage toileting schedule  Outcome: Progressing     Problem: SKIN/TISSUE INTEGRITY - ADULT  Goal: Skin integrity remains intact  Description: INTERVENTIONS  - Assess and document risk factors for pressure ulcer development  - Assess and document skin integrity  - Monitor for areas of redness and/or skin breakdown  - Initiate interventions, skin care algorithm/standards of care as needed  Outcome: Progressing     Problem: Impaired Swallowing  Goal: Minimize aspiration risk  Description: Interventions:  - Patient should be alert and upright for all feedings (90 degrees preferred)  - Offer food and liquids at a slow rate  - No straws  - Encourage small bites of food and small sips of liquid  - Offer pills one at a time, crush or  deliver with applesauce as needed  - Discontinue feeding and notify MD (or speech pathologist) if coughing or persistent throat clearing or wet/gurgly vocal quality is noted  Outcome: Progressing     Problem: Patient/Family Goals  Goal: Patient/Family Long Term Goal  Description: Patient's Long Term Goal: comfort    Interventions:  - Follow POC  - meds as ordered  - See additional Care Plan goals for specific interventions  Outcome: Progressing  Goal: Patient/Family Short Term Goal  Description: Patient's Short Term Goal: improved agitation    Interventions:   - follow POC  - meds as ordered  - PRN meds as needed  - See additional Care Plan goals for specific interventions  Outcome: Progressing     Problem: COPING  Goal: Pt/Family able to verbalize concerns and demonstrate effective coping strategies  Description: INTERVENTIONS:  - Assist patient/family to identify coping skills, available support systems and cultural and spiritual values  - Provide emotional support, including active listening and acknowledgement of concerns of patient and caregivers  - Reduce environmental stimuli, as able  - Instruct patient/family in relaxation techniques, as appropriate  - Assess for spiritual and psychosocial needs and initiate Spiritual Care or Behavioral Health consult as needed  Outcome: Progressing     Problem: DEATH & DYING  Goal: Pt/Family communicate acceptance of impending death and feel psychological comfort and peace  Description: INTERVENTIONS:  - Assess patient/family anxiety and grief process related to end of life issues  - Provide emotional and spiritual support  - Provide information about the patient’s health status with consideration of family and cultural values  - Communicate willingness to discuss death and facilitate grief process  with patient/family as appropriate  - Emphasize sustaining relationships within family system and community, or gómez/spiritual traditions  - Initiate Spiritual Care consult as  needed  Outcome: Progressing

## 2025-07-23 NOTE — PLAN OF CARE
Remains on dilaudid gtt, appearing uncomfortable. Remains Unresponsive/obtunded. Junior remains to gravity. Comfort care and EOL measures in place and maintained.     Problem: Risk for Violence/Aggression  Goal: Absence of Violence/Aggression  Description: INTERVENTIONS:   - Identify precipitating factors for behavior   - Notify Charge RN/Provider   - Consider decreasing stimulation   - Consider distraction measures   - Consider discussion with provider regarding prn meds    - Consider URMILA (Moderate Risk only)   - Consider Code Support (High Risk only)   - Consider room safety checks   - Consider restraints  Outcome: Progressing     Problem: PAIN - ADULT  Goal: Verbalizes/displays adequate comfort level or patient's stated pain goal  Description: INTERVENTIONS:  - Encourage pt to monitor pain and request assistance  - Assess pain using appropriate pain scale  - Administer analgesics based on type and severity of pain and evaluate response  - Implement non-pharmacological measures as appropriate and evaluate response  - Consider cultural and social influences on pain and pain management  - Manage/alleviate anxiety  - Utilize distraction and/or relaxation techniques  - Monitor for opioid side effects  - Notify MD/LIP if interventions unsuccessful or patient reports new pain  - Anticipate increased pain with activity and pre-medicate as appropriate  Outcome: Progressing     Problem: SAFETY ADULT - FALL  Goal: Free from fall injury  Description: INTERVENTIONS:  - Assess pt frequently for physical needs  - Identify cognitive and physical deficits and behaviors that affect risk of falls.  - [place] fall precautions as indicated by assessment.  - Educate pt/family on patient safety including physical limitations  - Instruct pt to call for assistance with activity based on assessment  - Modify environment to reduce risk of injury  - Provide assistive devices as appropriate  - Consider OT/PT consult to assist with  strengthening/mobility  - Encourage toileting schedule  Outcome: Progressing     Problem: SKIN/TISSUE INTEGRITY - ADULT  Goal: Skin integrity remains intact  Description: INTERVENTIONS  - Assess and document risk factors for pressure ulcer development  - Assess and document skin integrity  - Monitor for areas of redness and/or skin breakdown  - Initiate interventions, skin care algorithm/standards of care as needed  Outcome: Progressing     Problem: Impaired Swallowing  Goal: Minimize aspiration risk  Description: Interventions:  - Patient should be alert and upright for all feedings (90 degrees preferred)  - Offer food and liquids at a slow rate  - No straws  - Encourage small bites of food and small sips of liquid  - Offer pills one at a time, crush or deliver with applesauce as needed  - Discontinue feeding and notify MD (or speech pathologist) if coughing or persistent throat clearing or wet/gurgly vocal quality is noted  Outcome: Progressing     Problem: Patient/Family Goals  Goal: Patient/Family Long Term Goal  Description: Patient's Long Term Goal: comfort    Interventions:  - Follow POC  - meds as ordered  - See additional Care Plan goals for specific interventions  Outcome: Progressing  Goal: Patient/Family Short Term Goal  Description: Patient's Short Term Goal: improved agitation    Interventions:   - follow POC  - meds as ordered  - PRN meds as needed  - See additional Care Plan goals for specific interventions  Outcome: Progressing     Problem: COPING  Goal: Pt/Family able to verbalize concerns and demonstrate effective coping strategies  Description: INTERVENTIONS:  - Assist patient/family to identify coping skills, available support systems and cultural and spiritual values  - Provide emotional support, including active listening and acknowledgement of concerns of patient and caregivers  - Reduce environmental stimuli, as able  - Instruct patient/family in relaxation techniques, as appropriate  -  Assess for spiritual and psychosocial needs and initiate Spiritual Care or Behavioral Health consult as needed  Outcome: Progressing     Problem: DEATH & DYING  Goal: Pt/Family communicate acceptance of impending death and feel psychological comfort and peace  Description: INTERVENTIONS:  - Assess patient/family anxiety and grief process related to end of life issues  - Provide emotional and spiritual support  - Provide information about the patient’s health status with consideration of family and cultural values  - Communicate willingness to discuss death and facilitate grief process  with patient/family as appropriate  - Emphasize sustaining relationships within family system and community, or gómez/spiritual traditions  - Initiate Spiritual Care consult as needed  Outcome: Progressing

## 2025-07-24 NOTE — PROGRESS NOTES
[facility] Hospitalist Progress Note     CC: Hospital Follow up    PCP: Jimbo Fowler MD       Assessment/Plan:     Active Problems:    Cerebral atherosclerosis    Mr. Shoemaker is a 89 year old male with PMH advanced dementia with history of behavioral disorders and psychosis, hypotension recently presented for profound dehydration and sepsis requiring pressors and significant acute kidney injury and IV fluids ultimately also developed hypoxia and sepsis secondary to urinary tract infection and chronic urinary retention patient was treated medically but prognosis remain guarded ultimately patient was admitted to inpatient hospice.  Comfort care ongoing.      Acute inpatient hospice secondary to advanced dementia and moderate protein calorie malnutrition with behavioral disturbances  -Comfort care ongoing  -Disposition planning per hospice team  -Patient is on Dilaudid  drip  -resp rate stable    DNR comfort      Lines: Peripheral IV  Dispo: inpatient hospice,     Valeria Martínez DO  [facility] Hospitalist      Subjective:     Unresponsive. Appears comfortable    OBJECTIVE:    Blood pressure 148/61, pulse (!) 41, temperature 96.8 °F (36 °C), temperature source Axillary, resp. rate 14, SpO2 (!) 72%.    Pulse:  [41-68] 41  Resp:  [10-14] 14  BP: ()/(58-61) 148/61  SpO2:  [72 %] 72 %      Intake/Output:    Intake/Output Summary (Last 24 hours) at 7/24/2025 1307  Last data filed at 7/24/2025 0740  Gross per 24 hour   Intake 301.3 ml   Output 260 ml   Net 41.3 ml       Last 3 Weights   07/02/25 0611 131 lb 9.6 oz (59.7 kg)   07/01/25 0600 154 lb 9.6 oz (70.1 kg)   06/30/25 0900 150 lb 3.2 oz (68.1 kg)   06/29/25 1800 145 lb (65.8 kg)   08/02/23 1319 111 lb 8 oz (50.6 kg)   07/20/23 0019 117 lb 4.8 oz (53.2 kg)   07/19/23 2345 117 lb 4.8 oz (53.2 kg)   07/18/23 1039 126 lb 6.4 oz (57.3 kg)   07/03/23 1958 141 lb 8.6 oz (64.2 kg)   07/03/23 1357 145 lb (65.8 kg)       Exam   Gen: Lethargic laying in  bed, unresponsive  Pulm: Lungs clear.           Data Review:       Labs:     No results for input(s): \"RBC\", \"HGB\", \"HCT\", \"MCV\", \"MCH\", \"MCHC\", \"RDW\", \"NEPRELIM\", \"WBC\", \"PLT\" in the last 168 hours.        No results for input(s): \"GLU\", \"BUN\", \"CREATSERUM\", \"GFRAA\", \"GFRNAA\", \"EGFRCR\", \"CA\", \"NA\", \"K\", \"CL\", \"CO2\" in the last 168 hours.      No results for input(s): \"ALT\", \"AST\", \"ALB\", \"AMYLASE\", \"LIPASE\", \"LDH\" in the last 168 hours.    Invalid input(s): \"ALPHOS\", \"TBIL\", \"DBIL\", \"TPROT\"      Imaging:  No results found.      Meds:     Scheduled Medications[1]  Medication Infusions[2]  PRN Medications[3]                                         [1]    scopolamine  1 patch Transdermal Q72H   [2]    HYDROmorphone in sodium chloride 0.9% 3 mg/hr (07/24/25 1249)   [3]   HYDROmorphone in sodium chloride 0.9%    atropine    glycerin-hypromellose-    haloperidol lactate **OR** haloperidol lactate    [DISCONTINUED] LORazepam **OR** LORazepam **OR** LORazepam    LORazepam Intensol    HYDROmorphone **OR** HYDROmorphone **OR** HYDROmorphone    diazepam    bisacodyl    acetaminophen    OLANZapine    sodium chloride 0.9%    glycopyrrolate    ondansetron **OR** ondansetron

## 2025-07-24 NOTE — PLAN OF CARE
Problem: PAIN - ADULT  Goal: Verbalizes/displays adequate comfort level or patient's stated pain goal  Description: INTERVENTIONS:  - Encourage pt to monitor pain and request assistance  - Assess pain using appropriate pain scale  - Administer analgesics based on type and severity of pain and evaluate response  - Implement non-pharmacological measures as appropriate and evaluate response  - Consider cultural and social influences on pain and pain management  - Manage/alleviate anxiety  - Utilize distraction and/or relaxation techniques  - Monitor for opioid side effects  - Notify MD/LIP if interventions unsuccessful or patient reports new pain  - Anticipate increased pain with activity and pre-medicate as appropriate  Outcome: Progressing     Problem: SAFETY ADULT - FALL  Goal: Free from fall injury  Description: INTERVENTIONS:  - Assess pt frequently for physical needs  - Identify cognitive and physical deficits and behaviors that affect risk of falls.  - [place] fall precautions as indicated by assessment.  - Educate pt/family on patient safety including physical limitations  - Instruct pt to call for assistance with activity based on assessment  - Modify environment to reduce risk of injury  - Provide assistive devices as appropriate  - Consider OT/PT consult to assist with strengthening/mobility  - Encourage toileting schedule  Outcome: Progressing     Problem: DEATH & DYING  Goal: Pt/Family communicate acceptance of impending death and feel psychological comfort and peace  Description: INTERVENTIONS:  - Assess patient/family anxiety and grief process related to end of life issues  - Provide emotional and spiritual support  - Provide information about the patient’s health status with consideration of family and cultural values  - Communicate willingness to discuss death and facilitate grief process  with patient/family as appropriate  - Emphasize sustaining relationships within family system and community,  or gómez/spiritual traditions  - Initiate Spiritual Care consult as needed  Outcome: Progressing     Pt resting in bed. Unresponsive. Repositioned throughout shift. Appears comfortable. Dilaudid drip maintained. Junior in place. Safety measures maintained. Frequent rounding being done.

## 2025-07-24 NOTE — HOSPICE RN NOTE
[facility] Inpatient Nursing Rounds:  No family at bedside during assessment. Hamzah remains minimally responsive. Slight twitching of foot when arm is raised. Eyes slightly open. RR 14, shallow and irregular. Skin pale and cool to touch. Mottling on BL feet and fingers.  Hypoactive BS, Last BM 7/23. Junior in place drain, draining 250 ml last 12 hrs, dark wisam urine.      Medications last 24 hrs:  Dilaudid gtt 3 mg/hr for pain and dyspnea    PPS: 10%    Afternoon Rounds:  Patient remains minimally responsive. Olivia MURCIA notified  RN that patient required Dilaudid 0.8mg IVP x1 and Ativan 1mg IVP x1 for pain and agitation when administering eye drops. Olivia states he started twitching from touch.     Patient remains eligible for general inpatient hospice care for symptom management of Pain, Dyspnea, and Agitation requiring frequent nursing assessments and interventions including titration of IV medications. Transfer to [facility] with CADD pump therapy on hold.  POC discussed with Olivia MURCIA, Dr. Martínez, Dr. Jacome. All in agreement. [facility] will continue to support the patient and family.      Destiny HALLN, RN  Transitional Nurse Liaison  [facility]  [phone] (After-hours)

## 2025-07-25 NOTE — HOSPICE RN NOTE
[facility] Inpatient Nursing Rounds:     Patient seen bedside without family present. Patient's breathing is very shallow, with on and off labored breaths, with mottling present, faint pedal pulses. Junior draining dark urine w/out issue.  BP 82/52, with temporal wasting prominent.    Dilaudid gtt 3mg/hr, Dilaudid IVP 0.8MG X 1, Ativan IVP X 1 in the past 24 hours.      PPS: 10%    Patient remains eligible for general inpatient hospice care for symptom management of pain/dyspnea and agitation requiring frequent nursing assessments and interventions including titration of IV medications. POC discussed with family and Denise MURCIA. All are in agreement. [facility] will continue to support the patient and family.     Fátima Gong, DIVINA, [facility]  [facility] RN Liaison  [phone] (After-hours)

## 2025-07-25 NOTE — PLAN OF CARE
Patient unresponsive. Dilaudid gtt at 3mg/hr. Junior in place with brown urine output. Comfort and safety measures in place. Frequent rounding provided.

## 2025-07-25 NOTE — PROGRESS NOTES
[facility] Hospitalist Progress Note     CC: Hospital Follow up    PCP: Jimbo Fowler MD       Assessment/Plan:     Active Problems:    Cerebral atherosclerosis    Mr. Shoemaker is a 89 year old male with PMH advanced dementia with history of behavioral disorders and psychosis, hypotension recently presented for profound dehydration and sepsis requiring pressors and significant acute kidney injury and IV fluids ultimately also developed hypoxia and sepsis secondary to urinary tract infection and chronic urinary retention patient was treated medically but prognosis remain guarded ultimately patient was admitted to inpatient hospice.  Comfort care ongoing.      Acute inpatient hospice secondary to advanced dementia and moderate protein calorie malnutrition with behavioral disturbances  -Comfort care ongoing  -Disposition planning per hospice team  -Patient is on Dilaudid  drip  -resp rate stable    DNR comfort      Lines: Peripheral IV  Dispo: inpatient hospice,     Valeria Martínez DO  [facility] Hospitalist      Subjective:     Unresponsive.     OBJECTIVE:    Blood pressure (!) 82/52, pulse 51, temperature 96.8 °F (36 °C), temperature source Axillary, resp. rate 10, SpO2 98%.    Pulse:  [49-51] 51  Resp:  [10] 10  BP: (82-98)/(52-55) 82/52  SpO2:  [98 %] 98 %      Intake/Output:    Intake/Output Summary (Last 24 hours) at 7/25/2025 1224  Last data filed at 7/25/2025 0717  Gross per 24 hour   Intake 341.6 ml   Output 300 ml   Net 41.6 ml       Last 3 Weights   07/02/25 0611 131 lb 9.6 oz (59.7 kg)   07/01/25 0600 154 lb 9.6 oz (70.1 kg)   06/30/25 0900 150 lb 3.2 oz (68.1 kg)   06/29/25 1800 145 lb (65.8 kg)   08/02/23 1319 111 lb 8 oz (50.6 kg)   07/20/23 0019 117 lb 4.8 oz (53.2 kg)   07/19/23 2345 117 lb 4.8 oz (53.2 kg)   07/18/23 1039 126 lb 6.4 oz (57.3 kg)   07/03/23 1958 141 lb 8.6 oz (64.2 kg)   07/03/23 1357 145 lb (65.8 kg)       Exam   Gen: Lethargic laying in bed, unresponsive  Pulm:  Lungs clear.       Data Review:       Labs:     No results for input(s): \"RBC\", \"HGB\", \"HCT\", \"MCV\", \"MCH\", \"MCHC\", \"RDW\", \"NEPRELIM\", \"WBC\", \"PLT\" in the last 168 hours.        No results for input(s): \"GLU\", \"BUN\", \"CREATSERUM\", \"GFRAA\", \"GFRNAA\", \"EGFRCR\", \"CA\", \"NA\", \"K\", \"CL\", \"CO2\" in the last 168 hours.      No results for input(s): \"ALT\", \"AST\", \"ALB\", \"AMYLASE\", \"LIPASE\", \"LDH\" in the last 168 hours.    Invalid input(s): \"ALPHOS\", \"TBIL\", \"DBIL\", \"TPROT\"      Imaging:  No results found.      Meds:     Scheduled Medications[1]  Medication Infusions[2]  PRN Medications[3]                                         [1]    scopolamine  1 patch Transdermal Q72H   [2]    HYDROmorphone in sodium chloride 0.9% 3 mg/hr (07/25/25 0818)   [3]   HYDROmorphone in sodium chloride 0.9%    atropine    glycerin-hypromellose-    haloperidol lactate **OR** haloperidol lactate    [DISCONTINUED] LORazepam **OR** LORazepam **OR** LORazepam    LORazepam Intensol    HYDROmorphone **OR** HYDROmorphone **OR** HYDROmorphone    diazepam    bisacodyl    acetaminophen    OLANZapine    sodium chloride 0.9%    glycopyrrolate    ondansetron **OR** ondansetron

## 2025-07-25 NOTE — PLAN OF CARE
Pt unresponsive. Dilaudid drip maintained. Repositioned as needed. Junior in place, care provided. Safety measures in place. Frequent rounding preformed.    Problem: Risk for Violence/Aggression  Goal: Absence of Violence/Aggression  Description: INTERVENTIONS:   - Identify precipitating factors for behavior   - Notify Charge RN/Provider   - Consider decreasing stimulation   - Consider distraction measures   - Consider discussion with provider regarding prn meds    - Consider URMILA (Moderate Risk only)   - Consider Code Support (High Risk only)   - Consider room safety checks   - Consider restraints  Outcome: Progressing     Problem: PAIN - ADULT  Goal: Verbalizes/displays adequate comfort level or patient's stated pain goal  Description: INTERVENTIONS:  - Encourage pt to monitor pain and request assistance  - Assess pain using appropriate pain scale  - Administer analgesics based on type and severity of pain and evaluate response  - Implement non-pharmacological measures as appropriate and evaluate response  - Consider cultural and social influences on pain and pain management  - Manage/alleviate anxiety  - Utilize distraction and/or relaxation techniques  - Monitor for opioid side effects  - Notify MD/LIP if interventions unsuccessful or patient reports new pain  - Anticipate increased pain with activity and pre-medicate as appropriate  Outcome: Progressing     Problem: SAFETY ADULT - FALL  Goal: Free from fall injury  Description: INTERVENTIONS:  - Assess pt frequently for physical needs  - Identify cognitive and physical deficits and behaviors that affect risk of falls.  - [place] fall precautions as indicated by assessment.  - Educate pt/family on patient safety including physical limitations  - Instruct pt to call for assistance with activity based on assessment  - Modify environment to reduce risk of injury  - Provide assistive devices as appropriate  - Consider OT/PT consult to assist with  strengthening/mobility  - Encourage toileting schedule  Outcome: Progressing     Problem: SKIN/TISSUE INTEGRITY - ADULT  Goal: Skin integrity remains intact  Description: INTERVENTIONS  - Assess and document risk factors for pressure ulcer development  - Assess and document skin integrity  - Monitor for areas of redness and/or skin breakdown  - Initiate interventions, skin care algorithm/standards of care as needed  Outcome: Progressing     Problem: Impaired Swallowing  Goal: Minimize aspiration risk  Description: Interventions:  - Patient should be alert and upright for all feedings (90 degrees preferred)  - Offer food and liquids at a slow rate  - No straws  - Encourage small bites of food and small sips of liquid  - Offer pills one at a time, crush or deliver with applesauce as needed  - Discontinue feeding and notify MD (or speech pathologist) if coughing or persistent throat clearing or wet/gurgly vocal quality is noted  Outcome: Progressing     Problem: Patient/Family Goals  Goal: Patient/Family Long Term Goal  Description: Patient's Long Term Goal: comfort    Interventions:  - Follow POC  - meds as ordered  - See additional Care Plan goals for specific interventions  Outcome: Progressing  Goal: Patient/Family Short Term Goal  Description: Patient's Short Term Goal: improved agitation    Interventions:   - follow POC  - meds as ordered  - PRN meds as needed  - See additional Care Plan goals for specific interventions  Outcome: Progressing     Problem: COPING  Goal: Pt/Family able to verbalize concerns and demonstrate effective coping strategies  Description: INTERVENTIONS:  - Assist patient/family to identify coping skills, available support systems and cultural and spiritual values  - Provide emotional support, including active listening and acknowledgement of concerns of patient and caregivers  - Reduce environmental stimuli, as able  - Instruct patient/family in relaxation techniques, as appropriate  -  Assess for spiritual and psychosocial needs and initiate Spiritual Care or Behavioral Health consult as needed  Outcome: Progressing     Problem: DEATH & DYING  Goal: Pt/Family communicate acceptance of impending death and feel psychological comfort and peace  Description: INTERVENTIONS:  - Assess patient/family anxiety and grief process related to end of life issues  - Provide emotional and spiritual support  - Provide information about the patient’s health status with consideration of family and cultural values  - Communicate willingness to discuss death and facilitate grief process  with patient/family as appropriate  - Emphasize sustaining relationships within family system and community, or g[place]mez/spiritual traditions  - Initiate Spiritual Care consult as needed  Outcome: Progressing

## 2025-07-26 NOTE — HOSPICE RN NOTE
[facility] Inpatient Nursing Rounds:     Patient seen at bedside with family present. Patient's breathing  is shallow, mildly labored. Patient with faint pulses on extremities, mottling present.      Dilaudid gtt 3.5mg/hr, Ativan IVP X 1, Scopolamine patch in the past 24 hours.     PPS: 10%    Patient remains eligible for general inpatient hospice care for symptom management of pain/dyspnea requiring frequent nursing assessments and interventions including titration of IV medications. POC discussed with son and Denise MURCIA. All are in agreement. [facility] will continue to support the patient and family.     Fátima Gong RN, PN  [facility] RN Liaison  [phone] (After-hours)

## 2025-07-26 NOTE — PROGRESS NOTES
[facility] Hospitalist Progress Note     CC: Hospital Follow up    PCP: Jimbo Fowler MD       Assessment/Plan:     Active Problems:    Cerebral atherosclerosis    Mr. Shoemaker is a 89 year old male with PMH advanced dementia with history of behavioral disorders and psychosis, hypotension recently presented for profound dehydration and sepsis requiring pressors and significant acute kidney injury and IV fluids ultimately also developed hypoxia and sepsis secondary to urinary tract infection and chronic urinary retention patient was treated medically but prognosis remain guarded ultimately patient was admitted to inpatient hospice.  Comfort care ongoing.      Acute inpatient hospice secondary to advanced dementia and moderate protein calorie malnutrition with behavioral disturbances  -Comfort care ongoing  -Disposition planning per hospice team  -Patient is on Dilaudid  drip, increased today     DNR comfort      Lines: Peripheral IV  Dispo: inpatient hospice,     Valeria Martínez DO  [facility] Hospitalist      Subjective:     Unresponsive. Respiratory rate seemed increased this AM    OBJECTIVE:    Blood pressure (!) 89/52, pulse 57, temperature 96.8 °F (36 °C), temperature source Axillary, resp. rate 10, SpO2 98%.    Pulse:  [57] 57  BP: (89)/(52) 89/52      Intake/Output:    Intake/Output Summary (Last 24 hours) at 7/26/2025 1156  Last data filed at 7/26/2025 0720  Gross per 24 hour   Intake 340.85 ml   Output 550 ml   Net -209.15 ml       Last 3 Weights   07/02/25 0611 131 lb 9.6 oz (59.7 kg)   07/01/25 0600 154 lb 9.6 oz (70.1 kg)   06/30/25 0900 150 lb 3.2 oz (68.1 kg)   06/29/25 1800 145 lb (65.8 kg)   08/02/23 1319 111 lb 8 oz (50.6 kg)   07/20/23 0019 117 lb 4.8 oz (53.2 kg)   07/19/23 2345 117 lb 4.8 oz (53.2 kg)   07/18/23 1039 126 lb 6.4 oz (57.3 kg)   07/03/23 1958 141 lb 8.6 oz (64.2 kg)   07/03/23 1357 145 lb (65.8 kg)       Exam   Gen: Lethargic laying in bed, unresponsive  Pulm: Lungs  clear.  But respirations appear higher     Data Review:       Labs:     No results for input(s): \"RBC\", \"HGB\", \"HCT\", \"MCV\", \"MCH\", \"MCHC\", \"RDW\", \"NEPRELIM\", \"WBC\", \"PLT\" in the last 168 hours.        No results for input(s): \"GLU\", \"BUN\", \"CREATSERUM\", \"GFRAA\", \"GFRNAA\", \"EGFRCR\", \"CA\", \"NA\", \"K\", \"CL\", \"CO2\" in the last 168 hours.      No results for input(s): \"ALT\", \"AST\", \"ALB\", \"AMYLASE\", \"LIPASE\", \"LDH\" in the last 168 hours.    Invalid input(s): \"ALPHOS\", \"TBIL\", \"DBIL\", \"TPROT\"      Imaging:  No results found.      Meds:     Scheduled Medications[1]  Medication Infusions[2]  PRN Medications[3]                                         [1]    scopolamine  1 patch Transdermal Q72H   [2]    HYDROmorphone in sodium chloride 0.9% 3.5 mg/hr (07/26/25 1052)   [3]   HYDROmorphone in sodium chloride 0.9%    atropine    glycerin-hypromellose-    haloperidol lactate **OR** haloperidol lactate    [DISCONTINUED] LORazepam **OR** LORazepam **OR** LORazepam    LORazepam Intensol    HYDROmorphone **OR** HYDROmorphone **OR** HYDROmorphone    diazepam    bisacodyl    acetaminophen    OLANZapine    sodium chloride 0.9%    glycopyrrolate    ondansetron **OR** ondansetron

## 2025-07-26 NOTE — PLAN OF CARE
Pt unresponsive. Dilaudid drip maintained. Repositioned as needed. Junior in place, care provided. Safety measures in place. Frequent rounding preformed.     Problem: Risk for Violence/Aggression  Goal: Absence of Violence/Aggression  Description: INTERVENTIONS:   - Identify precipitating factors for behavior   - Notify Charge RN/Provider   - Consider decreasing stimulation   - Consider distraction measures   - Consider discussion with provider regarding prn meds    - Consider URMILA (Moderate Risk only)   - Consider Code Support (High Risk only)   - Consider room safety checks   - Consider restraints  Outcome: Progressing     Problem: PAIN - ADULT  Goal: Verbalizes/displays adequate comfort level or patient's stated pain goal  Description: INTERVENTIONS:  - Encourage pt to monitor pain and request assistance  - Assess pain using appropriate pain scale  - Administer analgesics based on type and severity of pain and evaluate response  - Implement non-pharmacological measures as appropriate and evaluate response  - Consider cultural and social influences on pain and pain management  - Manage/alleviate anxiety  - Utilize distraction and/or relaxation techniques  - Monitor for opioid side effects  - Notify MD/LIP if interventions unsuccessful or patient reports new pain  - Anticipate increased pain with activity and pre-medicate as appropriate  Outcome: Progressing     Problem: SAFETY ADULT - FALL  Goal: Free from fall injury  Description: INTERVENTIONS:  - Assess pt frequently for physical needs  - Identify cognitive and physical deficits and behaviors that affect risk of falls.  - [place] fall precautions as indicated by assessment.  - Educate pt/family on patient safety including physical limitations  - Instruct pt to call for assistance with activity based on assessment  - Modify environment to reduce risk of injury  - Provide assistive devices as appropriate  - Consider OT/PT consult to assist with  strengthening/mobility  - Encourage toileting schedule  Outcome: Progressing     Problem: SKIN/TISSUE INTEGRITY - ADULT  Goal: Skin integrity remains intact  Description: INTERVENTIONS  - Assess and document risk factors for pressure ulcer development  - Assess and document skin integrity  - Monitor for areas of redness and/or skin breakdown  - Initiate interventions, skin care algorithm/standards of care as needed  Outcome: Progressing     Problem: Impaired Swallowing  Goal: Minimize aspiration risk  Description: Interventions:  - Patient should be alert and upright for all feedings (90 degrees preferred)  - Offer food and liquids at a slow rate  - No straws  - Encourage small bites of food and small sips of liquid  - Offer pills one at a time, crush or deliver with applesauce as needed  - Discontinue feeding and notify MD (or speech pathologist) if coughing or persistent throat clearing or wet/gurgly vocal quality is noted  Outcome: Progressing     Problem: Patient/Family Goals  Goal: Patient/Family Long Term Goal  Description: Patient's Long Term Goal: comfort    Interventions:  - Follow POC  - meds as ordered  - See additional Care Plan goals for specific interventions  Outcome: Progressing  Goal: Patient/Family Short Term Goal  Description: Patient's Short Term Goal: improved agitation    Interventions:   - follow POC  - meds as ordered  - PRN meds as needed  - See additional Care Plan goals for specific interventions  Outcome: Progressing     Problem: COPING  Goal: Pt/Family able to verbalize concerns and demonstrate effective coping strategies  Description: INTERVENTIONS:  - Assist patient/family to identify coping skills, available support systems and cultural and spiritual values  - Provide emotional support, including active listening and acknowledgement of concerns of patient and caregivers  - Reduce environmental stimuli, as able  - Instruct patient/family in relaxation techniques, as appropriate  -  Assess for spiritual and psychosocial needs and initiate Spiritual Care or Behavioral Health consult as needed  Outcome: Progressing     Problem: DEATH & DYING  Goal: Pt/Family communicate acceptance of impending death and feel psychological comfort and peace  Description: INTERVENTIONS:  - Assess patient/family anxiety and grief process related to end of life issues  - Provide emotional and spiritual support  - Provide information about the patient’s health status with consideration of family and cultural values  - Communicate willingness to discuss death and facilitate grief process  with patient/family as appropriate  - Emphasize sustaining relationships within family system and community, or gómez/spiritual traditions  - Initiate Spiritual Care consult as needed  Outcome: Progressing

## 2025-07-27 NOTE — DISCHARGE SUMMARY
General Medicine Discharge Summary     Patient ID:  Hamzah Shoemaker Jr.  89 year old  1935    Admit date: 2025    Discharge date and time: 25    Attending Physician: Valeria Martínez DO     Primary Care Physician: Jimbo Fowler MD     Discharge Diagnoses:   Sepsis  LORENZO  Advanced dementia   Moderate protein calorie malnutrition     Discharge Condition:      Hospital Course:    89-year-old male with dementia who presented to the hospital with confusion, altered mental status, hypoxia.  Shortly after admission to the hospital the patient had significant hypotension requiring transfer to the ICU.  He was initiated on treatment for presumed sepsis, with IV antibiotics, Junior catheter was exchanged, he was also hypoxic and O2 saturations were monitored.  He also suffered LORENZO with hypernatremia.  Given his advanced dementia and behavioral disturbances the primary team did start discussions about goals of care including transition to hospice with the family.  Informational meeting regarding hospice was done on 2025.  The patient was admitted to inpatient hospice on 2025.  Focus of the remainder of his care was purely on comfort, did require narcotic drip with Dilaudid.  The patient ended up passing away naturally on  and time of death was 3 PM.    Code Status: DNAR/Comfort Care    Total time coordinating care for discharge: Less than 30 minutes    Valeria Martínez DO

## 2025-07-27 NOTE — PROGRESS NOTES
[facility] Hospitalist Progress Note     CC: Hospital Follow up    PCP: Jimbo Fowler MD       Assessment/Plan:   Mr. Shoemaker is a 89 year old male with PMH advanced dementia with history of behavioral disorders and psychosis, hypotension recently presented for profound dehydration and sepsis requiring pressors and significant acute kidney injury and IV fluids ultimately also developed hypoxia and sepsis secondary to urinary tract infection and chronic urinary retention.      Acute inpatient hospice secondary to advanced dementia and moderate protein calorie malnutrition with behavioral disturbances  -Comfort care ongoing  -Disposition planning per hospice team  -Patient is on Dilaudid drip for many days. Does not appear it needs to be increased.     DNR comfort      Lines: Peripheral IV  Dispo: inpatient hospice     Valeria Jarvis [facility] Hospitalist      Subjective:     Unresponsive    OBJECTIVE:    Blood pressure (!) 88/23, pulse 76, temperature 96.8 °F (36 °C), temperature source Axillary, resp. rate 24, SpO2 90%.    Pulse:  [76-82] 76  Resp:  [10-24] 24  BP: (88-99)/(23-57) 88/23  SpO2:  [90 %] 90 %      Intake/Output:    Intake/Output Summary (Last 24 hours) at 7/27/2025 1040  Last data filed at 7/27/2025 0717  Gross per 24 hour   Intake 396.95 ml   Output 250 ml   Net 146.95 ml       Last 3 Weights   07/02/25 0611 131 lb 9.6 oz (59.7 kg)   07/01/25 0600 154 lb 9.6 oz (70.1 kg)   06/30/25 0900 150 lb 3.2 oz (68.1 kg)   06/29/25 1800 145 lb (65.8 kg)   08/02/23 1319 111 lb 8 oz (50.6 kg)   07/20/23 0019 117 lb 4.8 oz (53.2 kg)   07/19/23 2345 117 lb 4.8 oz (53.2 kg)   07/18/23 1039 126 lb 6.4 oz (57.3 kg)   07/03/23 1958 141 lb 8.6 oz (64.2 kg)   07/03/23 1357 145 lb (65.8 kg)       Exam   Gen: Lethargic laying in bed, unresponsive  Pulm: Lungs clear     Data Review:       Labs:     No results for input(s): \"RBC\", \"HGB\", \"HCT\", \"MCV\", \"MCH\", \"MCHC\", \"RDW\", \"NEPRELIM\", \"WBC\", \"PLT\" in  the last 168 hours.        No results for input(s): \"GLU\", \"BUN\", \"CREATSERUM\", \"GFRAA\", \"GFRNAA\", \"EGFRCR\", \"CA\", \"NA\", \"K\", \"CL\", \"CO2\" in the last 168 hours.      No results for input(s): \"ALT\", \"AST\", \"ALB\", \"AMYLASE\", \"LIPASE\", \"LDH\" in the last 168 hours.    Invalid input(s): \"ALPHOS\", \"TBIL\", \"DBIL\", \"TPROT\"      Imaging:  No results found.      Meds:     Scheduled Medications[1]  Medication Infusions[2]  PRN Medications[3]                                         [1]    scopolamine  1 patch Transdermal Q72H   [2]    HYDROmorphone in sodium chloride 0.9% 4 mg/hr (07/27/25 0858)   [3]   HYDROmorphone (Dilaudid)    diazepam    HYDROmorphone in sodium chloride 0.9%    atropine    glycerin-hypromellose-    haloperidol lactate **OR** haloperidol lactate    [DISCONTINUED] LORazepam **OR** LORazepam **OR** LORazepam    HYDROmorphone **OR** HYDROmorphone **OR** HYDROmorphone    bisacodyl    acetaminophen    OLANZapine    sodium chloride 0.9%    glycopyrrolate    [DISCONTINUED] ondansetron **OR** ondansetron

## 2025-07-27 NOTE — HOSPICE RN NOTE
[facility] Inpatient Nursing Rounds:     Patient seen at bedside without family present. Patient unresponsive, with rapid eye movement, open mouth breathing. RR- 24, slightly labored, mottling present, cyanotic fingers and toes, no palpable pedal pulses, dark brown scant urine draining w/o issue via pinzon. Recommending Valium IVP dose and a Bolus from the Dilaudid bag.     Dilaudid gtt 4mg/hr, Scopolamine patch, Ativan IVP X 1 in the past 24 hours.     PPS: 10%    Patient remains eligible for general inpatient hospice care for symptom management of pain/dyspnea/restlessness requiring frequent nursing assessments and interventions including titration of IV medications. POC discussed with family and . All are in agreement. [facility] will continue to support the patient and family.     Fátima Gong, RN, [facility]  [facility] RN Liaison  [phone] (After-hours)

## 2025-07-27 NOTE — HOSPICE RN NOTE
[facility] RN notified by Sultana MURCIA of patient's natural death. Reported TOD 1500 pm. Hospice RN called son Jorge L to offer condolences and bereavement resources/services. Jorge L stated he will call his brother to inform him Family stated understanding. [facility] team is available for family support and further resource education.     Gilda Boateng RN BSN  [facility] RN Liaison  [phone] (After-hours)

## 2025-07-27 NOTE — PLAN OF CARE
Problem: COPING  Goal: Pt/Family able to verbalize concerns and demonstrate effective coping strategies  Description: INTERVENTIONS:  - Assist patient/family to identify coping skills, available support systems and cultural and spiritual values  - Provide emotional support, including active listening and acknowledgement of concerns of patient and caregivers  - Reduce environmental stimuli, as able  - Instruct patient/family in relaxation techniques, as appropriate  - Assess for spiritual and psychosocial needs and initiate Spiritual Care or Behavioral Health consult as needed  Outcome: Completed     Problem: DEATH & DYING  Goal: Pt/Family communicate acceptance of impending death and feel psychological comfort and peace  Description: INTERVENTIONS:  - Assess patient/family anxiety and grief process related to end of life issues  - Provide emotional and spiritual support  - Provide information about the patient’s health status with consideration of family and cultural values  - Communicate willingness to discuss death and facilitate grief process  with patient/family as appropriate  - Emphasize sustaining relationships within family system and community, or g[place]me[place]/spiritual traditions  - Initiate Spiritual Care consult as needed  Outcome: Completed

## 2025-07-27 NOTE — PROGRESS NOTES
Pt  at [facility]. Resusitation not attempted as pt was DNR.    Time of Death 1500    Family Notified yes  Name and Relation Jorge L (son-POA)    Valeria Mcgraw    [facility] Notified yes (get Rep Name and Case Number) Filomena Gamez  #[id number]   Rep Cristopher, #[id number]

## 2025-07-27 NOTE — PLAN OF CARE
Pt unresponsive. Dilaudid drip maintained. Repositioned as needed. Junior in place, care provided. Safety measures in place. Frequent rounding preformed.     Problem: Risk for Violence/Aggression  Goal: Absence of Violence/Aggression  Description: INTERVENTIONS:   - Identify precipitating factors for behavior   - Notify Charge RN/Provider   - Consider decreasing stimulation   - Consider distraction measures   - Consider discussion with provider regarding prn meds    - Consider URMILA (Moderate Risk only)   - Consider Code Support (High Risk only)   - Consider room safety checks   - Consider restraints  Outcome: Progressing     Problem: PAIN - ADULT  Goal: Verbalizes/displays adequate comfort level or patient's stated pain goal  Description: INTERVENTIONS:  - Encourage pt to monitor pain and request assistance  - Assess pain using appropriate pain scale  - Administer analgesics based on type and severity of pain and evaluate response  - Implement non-pharmacological measures as appropriate and evaluate response  - Consider cultural and social influences on pain and pain management  - Manage/alleviate anxiety  - Utilize distraction and/or relaxation techniques  - Monitor for opioid side effects  - Notify MD/LIP if interventions unsuccessful or patient reports new pain  - Anticipate increased pain with activity and pre-medicate as appropriate  Outcome: Progressing     Problem: SAFETY ADULT - FALL  Goal: Free from fall injury  Description: INTERVENTIONS:  - Assess pt frequently for physical needs  - Identify cognitive and physical deficits and behaviors that affect risk of falls.  - [place] fall precautions as indicated by assessment.  - Educate pt/family on patient safety including physical limitations  - Instruct pt to call for assistance with activity based on assessment  - Modify environment to reduce risk of injury  - Provide assistive devices as appropriate  - Consider OT/PT consult to assist with  strengthening/mobility  - Encourage toileting schedule  Outcome: Progressing     Problem: SKIN/TISSUE INTEGRITY - ADULT  Goal: Skin integrity remains intact  Description: INTERVENTIONS  - Assess and document risk factors for pressure ulcer development  - Assess and document skin integrity  - Monitor for areas of redness and/or skin breakdown  - Initiate interventions, skin care algorithm/standards of care as needed  Outcome: Progressing     Problem: Impaired Swallowing  Goal: Minimize aspiration risk  Description: Interventions:  - Patient should be alert and upright for all feedings (90 degrees preferred)  - Offer food and liquids at a slow rate  - No straws  - Encourage small bites of food and small sips of liquid  - Offer pills one at a time, crush or deliver with applesauce as needed  - Discontinue feeding and notify MD (or speech pathologist) if coughing or persistent throat clearing or wet/gurgly vocal quality is noted  Outcome: Progressing     Problem: Patient/Family Goals  Goal: Patient/Family Long Term Goal  Description: Patient's Long Term Goal: comfort    Interventions:  - Follow POC  - meds as ordered  - See additional Care Plan goals for specific interventions  Outcome: Progressing  Goal: Patient/Family Short Term Goal  Description: Patient's Short Term Goal: improved agitation    Interventions:   - follow POC  - meds as ordered  - PRN meds as needed  - See additional Care Plan goals for specific interventions  Outcome: Progressing     Problem: COPING  Goal: Pt/Family able to verbalize concerns and demonstrate effective coping strategies  Description: INTERVENTIONS:  - Assist patient/family to identify coping skills, available support systems and cultural and spiritual values  - Provide emotional support, including active listening and acknowledgement of concerns of patient and caregivers  - Reduce environmental stimuli, as able  - Instruct patient/family in relaxation techniques, as appropriate  -  Assess for spiritual and psychosocial needs and initiate Spiritual Care or Behavioral Health consult as needed  Outcome: Progressing     Problem: DEATH & DYING  Goal: Pt/Family communicate acceptance of impending death and feel psychological comfort and peace  Description: INTERVENTIONS:  - Assess patient/family anxiety and grief process related to end of life issues  - Provide emotional and spiritual support  - Provide information about the patient’s health status with consideration of family and cultural values  - Communicate willingness to discuss death and facilitate grief process  with patient/family as appropriate  - Emphasize sustaining relationships within family system and community, or g[place]mez/spiritual traditions  - Initiate Spiritual Care consult as needed  Outcome: Progressing

## 2025-08-09 NOTE — PROGRESS NOTES
Physician Clarification    Additional information related to the etiology of hyptension/shock  Patient had both           Hypovolemic shock due to dehydration    and           hypotension/ shock is due to sepsis    This note is part of the patient's medical record.

## (undated) NOTE — ED AVS SNAPSHOT
Eun Murray. MRN: [id number]    Department:  [facility] Emergency Department   Date of Visit:  7/25/2018           Disclosure     Insurance plans vary and the physician(s) referred by the ER may not be covered by your plan.  Please conta within the next three months to obtain basic health screening including reassessment of your blood pressure.     IF THERE IS ANY CHANGE OR WORSENING OF YOUR CONDITION, CALL YOUR PRIMARY CARE PHYSICIAN AT ONCE OR RETURN IMMEDIATELY TO THE EMERGENCY DEPARTMEN